# Patient Record
Sex: MALE | Race: WHITE | NOT HISPANIC OR LATINO | Employment: FULL TIME | URBAN - NONMETROPOLITAN AREA
[De-identification: names, ages, dates, MRNs, and addresses within clinical notes are randomized per-mention and may not be internally consistent; named-entity substitution may affect disease eponyms.]

---

## 2018-01-14 ENCOUNTER — HOSPITAL ENCOUNTER (EMERGENCY)
Facility: HOSPITAL | Age: 33
Discharge: HOME | End: 2018-01-14
Attending: NURSE PRACTITIONER | Admitting: NURSE PRACTITIONER
Payer: COMMERCIAL

## 2018-01-14 VITALS
DIASTOLIC BLOOD PRESSURE: 91 MMHG | OXYGEN SATURATION: 98 % | SYSTOLIC BLOOD PRESSURE: 168 MMHG | RESPIRATION RATE: 16 BRPM | TEMPERATURE: 97.5 F

## 2018-01-14 DIAGNOSIS — B34.9 VIRAL SYNDROME: ICD-10-CM

## 2018-01-14 LAB
DEPRECATED S PYO AG THROAT QL EIA: NORMAL
FLUAV+FLUBV AG SPEC QL: NEGATIVE
FLUAV+FLUBV AG SPEC QL: NEGATIVE
SPECIMEN SOURCE: NORMAL
SPECIMEN SOURCE: NORMAL

## 2018-01-14 PROCEDURE — 87804 INFLUENZA ASSAY W/OPTIC: CPT | Performed by: FAMILY MEDICINE

## 2018-01-14 PROCEDURE — 87880 STREP A ASSAY W/OPTIC: CPT | Performed by: NURSE PRACTITIONER

## 2018-01-14 PROCEDURE — 99213 OFFICE O/P EST LOW 20 MIN: CPT | Performed by: NURSE PRACTITIONER

## 2018-01-14 PROCEDURE — G0463 HOSPITAL OUTPT CLINIC VISIT: HCPCS

## 2018-01-14 PROCEDURE — 87081 CULTURE SCREEN ONLY: CPT | Performed by: NURSE PRACTITIONER

## 2018-01-14 ASSESSMENT — ENCOUNTER SYMPTOMS
SORE THROAT: 1
HEADACHES: 1
SINUS PAIN: 0
CHILLS: 0
SHORTNESS OF BREATH: 1
DIARRHEA: 1
VOMITING: 0
FATIGUE: 1
SINUS PRESSURE: 0
NAUSEA: 1
COUGH: 1
DYSURIA: 1
APPETITE CHANGE: 1
FEVER: 0
WHEEZING: 0
FREQUENCY: 0

## 2018-01-14 NOTE — ED AVS SNAPSHOT
[facility]    [address] [place] [address]    Phone:  [phone]                                       Reese Sanchez   MRN: [id number]    Department:  [facility]   Date of Visit:  1/14/2018           After Visit Summary Signature Page     I have received my discharge instructions, and my questions have been answered. I have discussed any challenges I see with this plan with the nurse or doctor.    ..........................................................................................................................................  Patient/Patient Representative Signature      ..........................................................................................................................................  Patient Representative Print Name and Relationship to Patient    ..................................................               ................................................  Date                                            Time    ..........................................................................................................................................  Reviewed by Signature/Title    ...................................................              ..............................................  Date                                                            Time

## 2018-01-14 NOTE — ED AVS SNAPSHOT
[facility]    [address] [address]    Phone:  [phone]                                       Reese Sanchez   MRN: [id number]    Department:  [facility]   Date of Visit:  1/14/2018           Patient Information     Date Of Birth          1985        Your diagnoses for this visit were:     Viral syndrome        You were seen by Veronica Sams NP.      Follow-up Information     Follow up with [facility].    Specialty:  EMERGENCY MEDICINE    Why:  As needed, If symptoms worsen, or concerns develop    Contact information:    [address]  [place] [address] [phone]    Additional information:    From [place]: Take [address]. Turn left at [address]. Turn left at the first stoplight on [address]. At the first stop sign, take a right onto [address]. Take a left into the parking lot and continue through until you reach the North enterance of the building.       From [place]: Take [address]. Take the [address] ramp towards [place]. Turn left onto [address]. Take a slight right onto [address]. Turn left at the first stoplight on [address]. At the first stop sign, take a right onto [address]. Take a left into the parking lot and continue through until you reach the North enterance of the building.       From [place]: Take [address]. Take a right at [address]. At the first stop sign, take a right onto [address]. Take a left into the parking lot and continue through until you reach the North enterance of the building.         Follow up with No Ref-Primary, Physician.    Why:  As needed, if symptoms do not improve        Discharge Instructions         Viral Syndrome (Adult)  A viral illness may cause a number of symptoms. The symptoms depend on the part of the body that the virus affects. If it settles in your nose, throat, and  "lungs, it may cause cough, sore throat, congestion, and sometimes headache. If it settles in your stomach and intestinal tract, it may cause vomiting and diarrhea. Sometimes it causes vague symptoms like \"aching all over,\" feeling tired, loss of appetite, or fever.  A viral illness usually lasts 1 to 2 weeks, but sometimes it lasts longer. In some cases, a more serious infection can look like a viral syndrome in the first few days of the illness. You may need another exam and additional tests to know the difference. Watch for the warning signs listed below.  Home care  Follow these guidelines for taking care of yourself at home:    If symptoms are severe, rest at home for the first 2 to 3 days.    Stay away from cigarette smoke - both your smoke and the smoke from others.    You may use over-the-counter acetaminophen or ibuprofen for fever, muscle aching, and headache, unless another medicine was prescribed for this. If you have chronic liver or kidney disease or ever had a stomach ulcer or GI bleeding, talk with your doctor before using these medicines. No one who is younger than 18 and ill with a fever should take aspirin. It may cause severe disease or death.    Your appetite may be poor, so a light diet is fine. Avoid dehydration by drinking 8 to 12 8-ounce glasses of fluids each day. This may include water; orange juice; lemonade; apple, grape, and cranberry juice; clear fruit drinks; electrolyte replacement and sports drinks; and decaffeinated teas and coffee. If you have been diagnosed with a kidney disease, ask your doctor how much and what types of fluids you should drink to prevent dehydration. If you have kidney disease, drinking too much fluid can cause it build up in the your body and be dangerous to your health.    Over-the-counter remedies won't shorten the length of the illness but may be helpful for cough, sore throat; and nasal and sinus congestion. Don't use decongestants if you have high blood " pressure.  Follow-up care  Follow up with your healthcare provider if you do not improve over the next week.  Call 911  Get emergency medical care if any of the following occur:    Convulsion    Feeling weak, dizzy, or like you are going to faint    Chest pain, shortness of breath, wheezing, or difficulty breathing  When to seek medical advice  Call your healthcare provider right away if any of these occur:    Cough with lots of colored sputum (mucus) or blood in your sputum    Chest pain, shortness of breath, wheezing, or difficulty breathing    Severe headache; face, neck, or ear pain    Severe, constant pain in the lower right side of your belly (abdominal)    Continued vomiting (can t keep liquids down)    Frequent diarrhea (more than 5 times a day); blood (red or black color) or mucus in diarrhea    Feeling weak, dizzy, or like you are going to faint    Extreme thirst    Fever of 100.4 F (38 C) or higher, or as directed by your healthcare provider  Date Last Reviewed: 9/25/2015 2000-2017 [employer]. [address] [place] [address]. All rights reserved. This information is not intended as a substitute for professional medical care. Always follow your healthcare professional's instructions.             Review of your medicines      Notice     You have not been prescribed any medications.            Procedures and tests performed during your visit     Beta strep group A culture    Influenza A/B antigen    Rapid strep screen      Orders Needing Specimen Collection     None      Pending Results     Date and Time Order Name Status Description    1/14/2018 1233 Beta strep group A culture In process             Pending Culture Results     Date and Time Order Name Status Description    1/14/2018 1233 Beta strep group A culture In process             Thank you for choosing [facility]       Thank you for choosing [facility] for your care. Our goal is always to provide you with excellent care.  "Hearing back from our patients is one way we can continue to improve our services. Please take a few minutes to complete the written survey that you may receive in the mail after you visit with us. Thank you!        [employer] Information     [employer] lets you send messages to your doctor, view your test results, renew your prescriptions, schedule appointments and more. To sign up, go to [email or web address] . Click on \"Log in\" on the left side of the screen, which will take you to the Welcome page. Then click on \"Sign up Now\" on the right side of the page.     You will be asked to enter the access code listed below, as well as some personal information. Please follow the directions to create your username and password.     Your access code is: [email or web address]  Expires: 2018 12:57 PM     Your access code will  in 90 days. If you need help or a new code, please call your [facility] clinic or [phone].        Care EveryWhere ID     This is your Care EveryWhere ID. This could be used by other organizations to access your [facility] medical records  [id number]        Equal Access to Services     BE EDUARDO : Hadraul Sommers, aide mason, charmaine james, ruy pedroza . [facility] [phone].    ATENCIÓN: Si habla español, tiene a quintero disposición servicios gratuitos de asistencia lingüística. Gregorio al [phone].    We comply with applicable federal civil rights laws and [place] laws. We do not discriminate on the basis of race, color, national origin, age, disability, sex, sexual orientation, or gender identity.            After Visit Summary       This is your record. Keep this with you and show to your community pharmacist(s) and doctor(s) at your next visit.                  "

## 2018-01-14 NOTE — DISCHARGE INSTRUCTIONS

## 2018-01-14 NOTE — ED PROVIDER NOTES
"  History     Chief Complaint   Patient presents with     Cough     c/o cough and body aches, notes \"took 3 days of tamiful and didn't finish his rx due to I was feeling better\".  notes he wasn't tested they just wrote him a rx and gave it to his wife due to she tested positive     The history is provided by the patient. No  was used.     Reese Sanchez is a 32 year old male who presents today with a CC of cough, body aches, headache, mild nasal congestion, intermittent sore throat x 3-4 days.  No fevers or chills.  He was ill with presumed influenza (daughter tested positive for influenza A) on January 3 and treated with 3 days of Tamiflu (he stopped after 3 days as he was feeling better).  He started feeling better for a week or so and has since started feeling worse again in the past 3-4 days.  He has been taking tylenol or ibuprofen for symptoms with some relief.  Appetite has been decreased.      Problem List:    There are no active problems to display for this patient.       Past Medical History:    History reviewed. No pertinent past medical history.    Past Surgical History:    History reviewed. No pertinent surgical history.    Family History:    No family history on file.    Social History:  Marital Status:   [2]  Social History   Substance Use Topics     Smoking status: Former Smoker     Smokeless tobacco: Never Used     Alcohol use No        Medications:      No current outpatient prescriptions on file.      Review of Systems   Constitutional: Positive for appetite change (decreased for solids, has been pushing water) and fatigue. Negative for chills and fever.   HENT: Positive for congestion (mild) and sore throat (intermittent). Negative for ear pain, sinus pain and sinus pressure.    Respiratory: Positive for cough and shortness of breath (intermittent). Negative for wheezing.    Gastrointestinal: Positive for diarrhea and nausea (infrequent). Negative for vomiting. "   Genitourinary: Positive for dysuria. Negative for frequency and urgency.   Skin: Negative for rash.   Neurological: Positive for headaches.       Physical Exam   BP: 168/91  Heart Rate: 94  Temp: 97.5  F (36.4  C)  Resp: 16  SpO2: 98 %      Physical Exam   Constitutional: He is oriented to person, place, and time. Vital signs are normal. He appears well-developed. He is cooperative.  Non-toxic appearance. He does not appear ill. No distress.   HENT:   Head: Normocephalic and atraumatic.   Right Ear: Tympanic membrane, external ear and ear canal normal.   Left Ear: Tympanic membrane, external ear and ear canal normal.   Nose: Nose normal.   Mouth/Throat: Uvula is midline and oropharynx is clear and moist.   Eyes: Conjunctivae are normal.   Neck: Normal range of motion. Neck supple.   Cardiovascular: Normal rate and regular rhythm.    Pulmonary/Chest: Effort normal and breath sounds normal.   Musculoskeletal: Normal range of motion.   Lymphadenopathy:     He has no cervical adenopathy.   Neurological: He is alert and oriented to person, place, and time.   Skin: Skin is warm and dry.   Psychiatric: He has a normal mood and affect. His behavior is normal.   Nursing note and vitals reviewed.      ED Course     ED Course     Procedures    Results for orders placed or performed during the hospital encounter of 01/14/18   Influenza A/B antigen   Result Value Ref Range    Influenza A/B Agn Specimen Nares     Influenza A Negative NEG^Negative    Influenza B Negative NEG^Negative   Rapid strep screen   Result Value Ref Range    Specimen Description Throat     Rapid Strep A Screen       NEGATIVE: No Group A streptococcal antigen detected by immunoassay, await culture report.   Beta strep group A culture   Result Value Ref Range    Specimen Description Throat     Culture Micro No beta hemolytic Streptococcus Group A isolated        Assessments & Plan (with Medical Decision Making)     I have reviewed the nursing notes.    I  have reviewed the findings, diagnosis, plan and need for follow up with the patient.  ASSESSMENT / PLAN:  (B34.9) Viral syndrome  Comment: exam is essentially normal, VS WNL except BP slightly elevated, long conversation with patient regarding symptoms and exam, at this point in the Urgent Care I don't find any compelling reason to do further imaging or lab work.  Advised patient to follow up with us or establish care with PCP if symptoms are not improving in 5-7 days, sooner if symptoms are significantly worsening  Plan:  Patient verbally educated and given appropriate education sheets for each of their diagnoses and has no questions.   Symptomatic treatments such as those listed below are recommended as needed:   Take OTC motrin or tylenol as directed on the bottle as needed.   Cool mist humidifier at bedside    May try safely elevating HOB or sleeping in recliner   Take OTC cold medicine as directed on bottle - check ingredients, many are multi symptom and may contain tylenol or motrin   Frequent sips of non-caffeine fluids, rest, wash hands often   Sinus rinses such as a netti pot may help with sinus symptoms   Return to ED/UC if symptoms worsen or concerns develop: shortness of breath,     chest pain, unable to control fever < 103 with medications, persistent vomiting, signs/symptoms of dehydration.   If symptoms persist follow up with PCP for re-evaluation.          There are no discharge medications for this patient.      Final diagnoses:   Viral syndrome       1/14/2018   [facility]     Veronica Sams NP  01/16/18 0930

## 2018-01-14 NOTE — ED NOTES
Pt presents with sore throat off and on for 1 week, tired, no energy,  productive cough but pt doesn't know the color of the phlegm,headaches.

## 2018-01-16 LAB
BACTERIA SPEC CULT: NORMAL
SPECIMEN SOURCE: NORMAL

## 2018-04-18 ENCOUNTER — OFFICE VISIT (OUTPATIENT)
Dept: FAMILY MEDICINE | Facility: OTHER | Age: 33
End: 2018-04-18
Attending: FAMILY MEDICINE
Payer: COMMERCIAL

## 2018-04-18 VITALS
HEART RATE: 71 BPM | WEIGHT: 214 LBS | HEIGHT: 74 IN | DIASTOLIC BLOOD PRESSURE: 86 MMHG | TEMPERATURE: 98.5 F | OXYGEN SATURATION: 98 % | SYSTOLIC BLOOD PRESSURE: 128 MMHG | BODY MASS INDEX: 27.46 KG/M2

## 2018-04-18 DIAGNOSIS — R53.83 FATIGUE, UNSPECIFIED TYPE: Primary | ICD-10-CM

## 2018-04-18 DIAGNOSIS — Z76.89 ESTABLISHING CARE WITH NEW DOCTOR, ENCOUNTER FOR: ICD-10-CM

## 2018-04-18 DIAGNOSIS — R03.0 ELEVATED BLOOD PRESSURE READING WITHOUT DIAGNOSIS OF HYPERTENSION: ICD-10-CM

## 2018-04-18 DIAGNOSIS — E55.9 VITAMIN D DEFICIENCY: ICD-10-CM

## 2018-04-18 DIAGNOSIS — R73.03 PREDIABETES: ICD-10-CM

## 2018-04-18 LAB
ALBUMIN SERPL-MCNC: 4.2 G/DL (ref 3.4–5)
ALBUMIN UR-MCNC: NEGATIVE MG/DL
ALP SERPL-CCNC: 85 U/L (ref 40–150)
ALT SERPL W P-5'-P-CCNC: 37 U/L (ref 0–70)
ANION GAP SERPL CALCULATED.3IONS-SCNC: 6 MMOL/L (ref 3–14)
APPEARANCE UR: CLEAR
AST SERPL W P-5'-P-CCNC: 19 U/L (ref 0–45)
BASOPHILS # BLD AUTO: 0.1 10E9/L (ref 0–0.2)
BASOPHILS NFR BLD AUTO: 1.5 %
BILIRUB SERPL-MCNC: 0.7 MG/DL (ref 0.2–1.3)
BILIRUB UR QL STRIP: NEGATIVE
BUN SERPL-MCNC: 14 MG/DL (ref 7–30)
CALCIUM SERPL-MCNC: 9.4 MG/DL (ref 8.5–10.1)
CHLORIDE SERPL-SCNC: 105 MMOL/L (ref 94–109)
CO2 SERPL-SCNC: 31 MMOL/L (ref 20–32)
COLOR UR AUTO: NORMAL
CREAT SERPL-MCNC: 0.92 MG/DL (ref 0.66–1.25)
DIFFERENTIAL METHOD BLD: NORMAL
EOSINOPHIL # BLD AUTO: 0.3 10E9/L (ref 0–0.7)
EOSINOPHIL NFR BLD AUTO: 3.8 %
ERYTHROCYTE [DISTWIDTH] IN BLOOD BY AUTOMATED COUNT: 12.3 % (ref 10–15)
GFR SERPL CREATININE-BSD FRML MDRD: >90 ML/MIN/1.7M2
GLUCOSE SERPL-MCNC: 109 MG/DL (ref 70–99)
GLUCOSE UR STRIP-MCNC: NEGATIVE MG/DL
HCT VFR BLD AUTO: 49.6 % (ref 40–53)
HGB BLD-MCNC: 17.4 G/DL (ref 13.3–17.7)
HGB UR QL STRIP: NEGATIVE
IMM GRANULOCYTES # BLD: 0 10E9/L (ref 0–0.4)
IMM GRANULOCYTES NFR BLD: 0.3 %
KETONES UR STRIP-MCNC: NEGATIVE MG/DL
LEUKOCYTE ESTERASE UR QL STRIP: NEGATIVE
LYMPHOCYTES # BLD AUTO: 1.6 10E9/L (ref 0.8–5.3)
LYMPHOCYTES NFR BLD AUTO: 23.2 %
MCH RBC QN AUTO: 31.5 PG (ref 26.5–33)
MCHC RBC AUTO-ENTMCNC: 35.1 G/DL (ref 31.5–36.5)
MCV RBC AUTO: 90 FL (ref 78–100)
MONOCYTES # BLD AUTO: 0.7 10E9/L (ref 0–1.3)
MONOCYTES NFR BLD AUTO: 9.9 %
NEUTROPHILS # BLD AUTO: 4.2 10E9/L (ref 1.6–8.3)
NEUTROPHILS NFR BLD AUTO: 61.3 %
NITRATE UR QL: NEGATIVE
NRBC # BLD AUTO: 0 10*3/UL
NRBC BLD AUTO-RTO: 0 /100
PH UR STRIP: 7 PH (ref 4.7–8)
PLATELET # BLD AUTO: 193 10E9/L (ref 150–450)
POTASSIUM SERPL-SCNC: 3.7 MMOL/L (ref 3.4–5.3)
PROT SERPL-MCNC: 7.4 G/DL (ref 6.8–8.8)
RBC # BLD AUTO: 5.53 10E12/L (ref 4.4–5.9)
SODIUM SERPL-SCNC: 142 MMOL/L (ref 133–144)
SOURCE: NORMAL
SP GR UR STRIP: 1 (ref 1–1.03)
TSH SERPL DL<=0.005 MIU/L-ACNC: 2.03 MU/L (ref 0.4–4)
UROBILINOGEN UR STRIP-MCNC: NORMAL MG/DL (ref 0–2)
WBC # BLD AUTO: 6.8 10E9/L (ref 4–11)

## 2018-04-18 PROCEDURE — 99203 OFFICE O/P NEW LOW 30 MIN: CPT | Performed by: FAMILY MEDICINE

## 2018-04-18 PROCEDURE — 81003 URINALYSIS AUTO W/O SCOPE: CPT | Performed by: FAMILY MEDICINE

## 2018-04-18 PROCEDURE — 80050 GENERAL HEALTH PANEL: CPT | Performed by: FAMILY MEDICINE

## 2018-04-18 PROCEDURE — 99000 SPECIMEN HANDLING OFFICE-LAB: CPT | Performed by: FAMILY MEDICINE

## 2018-04-18 PROCEDURE — 36415 COLL VENOUS BLD VENIPUNCTURE: CPT | Performed by: FAMILY MEDICINE

## 2018-04-18 PROCEDURE — 82306 VITAMIN D 25 HYDROXY: CPT | Mod: 90 | Performed by: FAMILY MEDICINE

## 2018-04-18 ASSESSMENT — PAIN SCALES - GENERAL: PAINLEVEL: NO PAIN (0)

## 2018-04-18 NOTE — NURSING NOTE
"Chief Complaint   Patient presents with     Establish Care     patient hasnt seen primary for 10 years- from the [facility]      Fatigue       Initial /86 (BP Location: Right arm, Patient Position: Chair, Cuff Size: Adult Large)  Pulse 71  Temp 98.5  F (36.9  C) (Tympanic)  Ht 6' 2\" (1.88 m)  Wt 214 lb (97.1 kg)  SpO2 98%  BMI 27.48 kg/m2 Estimated body mass index is 27.48 kg/(m^2) as calculated from the following:    Height as of this encounter: 6' 2\" (1.88 m).    Weight as of this encounter: 214 lb (97.1 kg).  Medication Reconciliation: completecomplete  JANE APONTE       "

## 2018-04-18 NOTE — PROGRESS NOTES
SUBJECTIVE:   Reese Sanchez is a 32 year old male who presents to clinic today for the following health issues:      Fatigue      Duration: 2-3months    Description (location/character/radiation): fatigue, no energy, shortness of breath on and off. Patient states every time his daughter is sick, he becomes sick but worse.     Intensity:  moderate    Accompanying signs and symptoms: none    History (similar episodes/previous evaluation): None    Precipitating or alleviating factors: None    Therapies tried and outcome: None     Feels tired, poor energy for months.  Recurrent illness, colds, URI, Influenza, stomach bug.  Denies insomnia or snoring or apnea.  Denies concerns with mood, depression, anxiety, etc.  Denies fever or nigh sweats.  Weight is stable.  Appetite has been poor.  Some nausea, but no vomiting.  Normal bowel/bladder function.  No abnormal bleeding.  No new rashes or bruising.  Occasionally feels he can't catch his breath, but no chest pain.  Denies history of seasonal allergies.  Does have a toddler.  Quit smoking in college. Denies substance use.  Works as .  No routine exercise.    BP was high at home, 140-160/100.        Problem list and histories reviewed & adjusted, as indicated.  Additional history: as documented    No current outpatient prescriptions on file.     No current facility-administered medications for this visit.        There is no problem list on file for this patient.    Past Surgical History:   Procedure Laterality Date     APPENDECTOMY       wisdom teeth extraction         Social History   Substance Use Topics     Smoking status: Former Smoker     Smokeless tobacco: Never Used     Alcohol use No     Family History   Problem Relation Age of Onset     Arrhythmia Maternal Grandmother      HEART DISEASE Maternal Grandfather      HEART DISEASE Paternal Grandfather            Reviewed and updated as needed this visit by clinical staff       Reviewed and updated as needed this  "visit by Provider         ROS:  Constitutional, HEENT, cardiovascular, pulmonary, GI, , musculoskeletal, neuro, skin, endocrine and psych systems are negative, except as otherwise noted.    OBJECTIVE:     /86 (BP Location: Right arm, Patient Position: Chair, Cuff Size: Adult Large)  Pulse 71  Temp 98.5  F (36.9  C) (Tympanic)  Ht 6' 2\" (1.88 m)  Wt 214 lb (97.1 kg)  SpO2 98%  BMI 27.48 kg/m2  Body mass index is 27.48 kg/(m^2).  GENERAL: healthy, alert and no distress  EYES: Eyes grossly normal to inspection, PERRL and conjunctivae and sclerae normal  HENT: ear canals and TM's normal, nose and mouth without ulcers or lesions  NECK: no adenopathy, no asymmetry, masses, or scars and thyroid normal to palpation  RESP: lungs clear to auscultation - no rales, rhonchi or wheezes  CV: regular rate and rhythm, normal S1 S2, no S3 or S4, no murmur, click or rub, no peripheral edema and peripheral pulses strong  ABDOMEN: soft, nontender, no hepatosplenomegaly, no masses and bowel sounds normal  MS: no gross musculoskeletal defects noted, no edema  SKIN: no suspicious lesions or rashes  NEURO: Normal strength and tone, mentation intact and speech normal  PSYCH: mentation appears normal, affect normal/bright      ASSESSMENT/PLAN:     (R53.83) Fatigue, unspecified type  (primary encounter diagnosis)  Comment: no red flags or B symptoms; no mood concerns; no specific sleep disorder concerns  Plan: CBC with platelets and differential,         Comprehensive metabolic panel, TSH with free T4        reflex, Vitamin D Deficiency, UA reflex to         Microscopic and Culture      (R03.0) Elevated blood pressure reading without diagnosis of hypertension    (Z76.89) Establishing care with new doctor, encounter for    Patient Instructions   Will call with lab results.  Advised daily men's multivitamin plus vitamin D 2000 units daily.  Advised daily exercise.  Start with achievable goals - 2-3 times per week, 20-30 minutes, " and build from there.    Practice good sleep hygiene - same sleep and wake time, routine exercise, limiting caffeine.    Stop in for nurse only BP checks.  Bring home cuff to compare for accuracy.                Nuha Barrientos MD  [facility]

## 2018-04-18 NOTE — PATIENT INSTRUCTIONS
Will call with lab results.  Advised daily men's multivitamin plus vitamin D 2000 units daily.  Advised daily exercise.  Start with achievable goals - 2-3 times per week, 20-30 minutes, and build from there.    Practice good sleep hygiene - same sleep and wake time, routine exercise, limiting caffeine.    Stop in for nurse only BP checks.  Bring home cuff to compare for accuracy.

## 2018-04-18 NOTE — MR AVS SNAPSHOT
"              After Visit Summary   4/18/2018    Reese Sanchez    MRN: [id number]           Patient Information     Date Of Birth          1985        Visit Information        Provider Department      4/18/2018 8:15 AM Nuha Hobbs MD [facility]        Today's Diagnoses     Fatigue, unspecified type    -  1    Elevated blood pressure reading without diagnosis of hypertension          Care Instructions    Will call with lab results.  Advised daily men's multivitamin plus vitamin D 2000 units daily.  Advised daily exercise.  Start with achievable goals - 2-3 times per week, 20-30 minutes, and build from there.    Practice good sleep hygiene - same sleep and wake time, routine exercise, limiting caffeine.    Stop in for nurse only BP checks.  Bring home cuff to compare for accuracy.              Follow-ups after your visit        Who to contact     If you have questions or need follow up information about today's clinic visit or your schedule please contact [facility] directly at [phone].  Normal or non-critical lab and imaging results will be communicated to you by LicenseMetricshart, letter or phone within 4 business days after the clinic has received the results. If you do not hear from us within 7 days, please contact the clinic through LicenseMetricshart or phone. If you have a critical or abnormal lab result, we will notify you by phone as soon as possible.  Submit refill requests through [employer] or call your pharmacy and they will forward the refill request to us. Please allow 3 business days for your refill to be completed.          Additional Information About Your Visit        Turning Art Information     Turning Art lets you send messages to your doctor, view your test results, renew your prescriptions, schedule appointments and more. To sign up, go to [email or web address] . Click on \"Log in\" on the left side of the screen, which will take you to the Welcome page. Then click on \"Sign up Now\" " "on the right side of the page.     You will be asked to enter the access code listed below, as well as some personal information. Please follow the directions to create your username and password.     Your access code is: [email or web address]  Expires: 2018  8:24 AM     Your access code will  in 90 days. If you need help or a new code, please call your [place] clinic or [phone].        Care EveryWhere ID     This is your Care EveryWhere ID. This could be used by other organizations to access your [place] medical records  [id number]        Your Vitals Were     Pulse Temperature Height Pulse Oximetry BMI (Body Mass Index)       71 98.5  F (36.9  C) (Tympanic) 6' 2\" (1.88 m) 98% 27.48 kg/m2        Blood Pressure from Last 3 Encounters:   18 128/86   18 168/91   16 142/100    Weight from Last 3 Encounters:   18 214 lb (97.1 kg)              We Performed the Following     CBC with platelets and differential     Comprehensive metabolic panel     TSH with free T4 reflex     UA reflex to Microscopic and Culture     Vitamin D Deficiency        Primary Care Provider Fax #    Physician No Ref-Primary [phone]       No address on file        Equal Access to Services     BE EDUARDO : Hadraul cottoo Sojason, waaxda luqadaha, qaybta kaalmada adeegyada, ruy dotson. So [facility] [phone].    ATENCIÓN: Si habla español, tiene a quintero disposición servicios gratuitos de asistencia lingüística. Llame al [phone].    We comply with applicable federal civil rights laws and [place] laws. We do not discriminate on the basis of race, color, national origin, age, disability, sex, sexual orientation, or gender identity.            Thank you!     [facility] you for [facility]  [facility]. Our goal is always to provide you with excellent care. Hearing back from our patients is one way we can continue to improve our services. Please take a few " minutes to complete the written survey that you may receive in the mail after your visit with us. Thank you!             Your Updated Medication List - Protect others around you: Learn how to safely use, store and throw away your medicines at www.disposemymeds.org.      Notice  As of 4/18/2018  8:24 AM    You have not been prescribed any medications.

## 2018-04-19 LAB — DEPRECATED CALCIDIOL+CALCIFEROL SERPL-MC: 14 UG/L (ref 20–75)

## 2018-04-23 ENCOUNTER — TELEPHONE (OUTPATIENT)
Dept: FAMILY MEDICINE | Facility: OTHER | Age: 33
End: 2018-04-23

## 2018-04-23 DIAGNOSIS — R73.03 PREDIABETES: Primary | ICD-10-CM

## 2018-04-23 NOTE — TELEPHONE ENCOUNTER
Dr. Hobbs,  I have been having a hard time getting ahold of patient for results.  I have left numerous messages et copied his results to [employer].  I have pended the [facility] referral for you to sign.  Thank you.

## 2018-05-08 ENCOUNTER — HOSPITAL ENCOUNTER (OUTPATIENT)
Dept: EDUCATION SERVICES | Facility: HOSPITAL | Age: 33
Discharge: HOME | End: 2018-05-08
Attending: FAMILY MEDICINE | Admitting: FAMILY MEDICINE
Payer: COMMERCIAL

## 2018-05-08 VITALS
DIASTOLIC BLOOD PRESSURE: 94 MMHG | HEART RATE: 72 BPM | HEIGHT: 74 IN | BODY MASS INDEX: 27.23 KG/M2 | WEIGHT: 212.2 LBS | SYSTOLIC BLOOD PRESSURE: 134 MMHG | OXYGEN SATURATION: 99 %

## 2018-05-08 DIAGNOSIS — R73.03 PREDIABETES: Primary | ICD-10-CM

## 2018-05-08 LAB — HBA1C MFR BLD: 5.1 % (ref 0–5.7)

## 2018-05-08 PROCEDURE — 97802 MEDICAL NUTRITION INDIV IN: CPT | Performed by: DIETITIAN, REGISTERED

## 2018-05-08 ASSESSMENT — ANXIETY QUESTIONNAIRES
6. BECOMING EASILY ANNOYED OR IRRITABLE: SEVERAL DAYS
IF YOU CHECKED OFF ANY PROBLEMS ON THIS QUESTIONNAIRE, HOW DIFFICULT HAVE THESE PROBLEMS MADE IT FOR YOU TO DO YOUR WORK, TAKE CARE OF THINGS AT HOME, OR GET ALONG WITH OTHER PEOPLE: SOMEWHAT DIFFICULT
2. NOT BEING ABLE TO STOP OR CONTROL WORRYING: SEVERAL DAYS
GAD7 TOTAL SCORE: 7
7. FEELING AFRAID AS IF SOMETHING AWFUL MIGHT HAPPEN: SEVERAL DAYS
5. BEING SO RESTLESS THAT IT IS HARD TO SIT STILL: SEVERAL DAYS
1. FEELING NERVOUS, ANXIOUS, OR ON EDGE: SEVERAL DAYS
3. WORRYING TOO MUCH ABOUT DIFFERENT THINGS: SEVERAL DAYS

## 2018-05-08 ASSESSMENT — PAIN SCALES - GENERAL: PAINLEVEL: NO PAIN (0)

## 2018-05-08 ASSESSMENT — PATIENT HEALTH QUESTIONNAIRE - PHQ9: 5. POOR APPETITE OR OVEREATING: SEVERAL DAYS

## 2018-05-08 NOTE — PROGRESS NOTES
"Pt is here today with dx of prediabetes.  Fasting glucose was 109 - pt feels he may have had some Gatorade prior to test.  A1c today was 5.1%.      /94  Pulse 72  Ht 1.88 m (6' 2\")  Wt 96.3 kg (212 lb 3.2 oz)  SpO2 99%  BMI 27.24 kg/m2  Pt has blood pressure cuff at home and has been checking.    Current weight is stable.     Pt has no family hx of diabetes.      Readiness to learn: willing.     Barriers to learning: none.    Verbal diet recall obtained.  Pt often skips breakfast.  Eats leftovers for lunch.  Supper is meat and starch.  He does not like vegetables much.  Minimal snacking.  No high sugar drinks.  Dines out approximately 2x/month.  No routine exercise. Job as a  is active.       Discussed dx of prediabetes, risk reduction as it relates to pre-diabetes/diabetes, portion control,  benefits of weight loss in preventing/slowing progression to dx of diabetes,  low/high blood sugar, importance of routine exercise.       Pt actively participate and demonstrated adequate understanding of topics discussed.    Plan: Work on more consistent meal times, more fruits/vegetables in diet, routine exercise.  Avoid weight gain.  Monitor glucose at yearly physical.      Time spent with patient: 30 minutes.     Follow up: as needed.     Charisse Mcginnis, BOBBY, CDE        "

## 2018-05-08 NOTE — NURSING NOTE
"Chief Complaint   Patient presents with     Diabetes     new       Initial /96  Pulse 72  Ht 1.88 m (6' 2\")  Wt 96.3 kg (212 lb 3.2 oz)  SpO2 99%  BMI 27.24 kg/m2 Estimated body mass index is 27.24 kg/(m^2) as calculated from the following:    Height as of this encounter: 1.88 m (6' 2\").    Weight as of this encounter: 96.3 kg (212 lb 3.2 oz).  Medication Reconciliation: complete    Courtney Dunham LPN    "

## 2018-05-09 ASSESSMENT — PATIENT HEALTH QUESTIONNAIRE - PHQ9: SUM OF ALL RESPONSES TO PHQ QUESTIONS 1-9: 5

## 2018-05-09 ASSESSMENT — ANXIETY QUESTIONNAIRES: GAD7 TOTAL SCORE: 7

## 2018-05-31 ENCOUNTER — OFFICE VISIT (OUTPATIENT)
Dept: FAMILY MEDICINE | Facility: OTHER | Age: 33
End: 2018-05-31
Attending: FAMILY MEDICINE
Payer: COMMERCIAL

## 2018-05-31 ENCOUNTER — RADIANT APPOINTMENT (OUTPATIENT)
Dept: GENERAL RADIOLOGY | Facility: OTHER | Age: 33
End: 2018-05-31
Attending: FAMILY MEDICINE
Payer: COMMERCIAL

## 2018-05-31 VITALS
BODY MASS INDEX: 26.95 KG/M2 | SYSTOLIC BLOOD PRESSURE: 122 MMHG | HEART RATE: 79 BPM | TEMPERATURE: 98.1 F | OXYGEN SATURATION: 98 % | HEIGHT: 74 IN | DIASTOLIC BLOOD PRESSURE: 84 MMHG | WEIGHT: 210 LBS

## 2018-05-31 DIAGNOSIS — R53.81 MALAISE: ICD-10-CM

## 2018-05-31 DIAGNOSIS — R53.83 FATIGUE, UNSPECIFIED TYPE: ICD-10-CM

## 2018-05-31 DIAGNOSIS — R50.9 FEVER, UNSPECIFIED FEVER CAUSE: ICD-10-CM

## 2018-05-31 DIAGNOSIS — R42 LIGHT HEADED: ICD-10-CM

## 2018-05-31 DIAGNOSIS — R53.81 MALAISE: Primary | ICD-10-CM

## 2018-05-31 LAB
BASOPHILS # BLD AUTO: 0.1 10E9/L (ref 0–0.2)
BASOPHILS NFR BLD AUTO: 1.1 %
DIFFERENTIAL METHOD BLD: ABNORMAL
EOSINOPHIL # BLD AUTO: 0.1 10E9/L (ref 0–0.7)
EOSINOPHIL NFR BLD AUTO: 2.1 %
ERYTHROCYTE [DISTWIDTH] IN BLOOD BY AUTOMATED COUNT: 12.1 % (ref 10–15)
HCT VFR BLD AUTO: 46.4 % (ref 40–53)
HETEROPH AB SER QL: NEGATIVE
HGB BLD-MCNC: 16.4 G/DL (ref 13.3–17.7)
IMM GRANULOCYTES # BLD: 0 10E9/L (ref 0–0.4)
IMM GRANULOCYTES NFR BLD: 0.2 %
LYMPHOCYTES # BLD AUTO: 1.6 10E9/L (ref 0.8–5.3)
LYMPHOCYTES NFR BLD AUTO: 26 %
MCH RBC QN AUTO: 31.3 PG (ref 26.5–33)
MCHC RBC AUTO-ENTMCNC: 35.3 G/DL (ref 31.5–36.5)
MCV RBC AUTO: 89 FL (ref 78–100)
MONOCYTES # BLD AUTO: 0.9 10E9/L (ref 0–1.3)
MONOCYTES NFR BLD AUTO: 14.2 %
NEUTROPHILS # BLD AUTO: 3.5 10E9/L (ref 1.6–8.3)
NEUTROPHILS NFR BLD AUTO: 56.4 %
NRBC # BLD AUTO: 0 10*3/UL
NRBC BLD AUTO-RTO: 0 /100
PLATELET # BLD AUTO: 125 10E9/L (ref 150–450)
RBC # BLD AUTO: 5.24 10E12/L (ref 4.4–5.9)
RETICS # AUTO: 50.3 10E9/L (ref 25–95)
RETICS/RBC NFR AUTO: 1 % (ref 0.5–2)
WBC # BLD AUTO: 6.3 10E9/L (ref 4–11)

## 2018-05-31 PROCEDURE — 36415 COLL VENOUS BLD VENIPUNCTURE: CPT | Performed by: FAMILY MEDICINE

## 2018-05-31 PROCEDURE — 86308 HETEROPHILE ANTIBODY SCREEN: CPT | Performed by: FAMILY MEDICINE

## 2018-05-31 PROCEDURE — 99214 OFFICE O/P EST MOD 30 MIN: CPT | Performed by: FAMILY MEDICINE

## 2018-05-31 PROCEDURE — 99000 SPECIMEN HANDLING OFFICE-LAB: CPT | Performed by: FAMILY MEDICINE

## 2018-05-31 PROCEDURE — 87015 SPECIMEN INFECT AGNT CONCNTJ: CPT | Performed by: FAMILY MEDICINE

## 2018-05-31 PROCEDURE — 40000847 ZZHCL STATISTIC MORPHOLOGY W/INTERP HISTOLOGY TC 85060: Performed by: FAMILY MEDICINE

## 2018-05-31 PROCEDURE — 86618 LYME DISEASE ANTIBODY: CPT | Mod: 90 | Performed by: FAMILY MEDICINE

## 2018-05-31 PROCEDURE — 87207 SMEAR SPECIAL STAIN: CPT | Mod: 90 | Performed by: FAMILY MEDICINE

## 2018-05-31 PROCEDURE — 71046 X-RAY EXAM CHEST 2 VIEWS: CPT | Mod: TC | Performed by: RADIOLOGY

## 2018-05-31 PROCEDURE — 85025 COMPLETE CBC W/AUTO DIFF WBC: CPT | Performed by: FAMILY MEDICINE

## 2018-05-31 PROCEDURE — 85045 AUTOMATED RETICULOCYTE COUNT: CPT | Performed by: FAMILY MEDICINE

## 2018-05-31 PROCEDURE — 87389 HIV-1 AG W/HIV-1&-2 AB AG IA: CPT | Mod: 90 | Performed by: FAMILY MEDICINE

## 2018-05-31 ASSESSMENT — PAIN SCALES - GENERAL: PAINLEVEL: MILD PAIN (3)

## 2018-05-31 NOTE — PATIENT INSTRUCTIONS
Will call with additional testing results - lab and chest xray.  If negative, proceed with CT chest, abdomen/pelvis.  Please keep diary of symptoms.  Most important is monitoring temperature for true fever (100.4 or higher).    Consider cardiac evaluation as well.

## 2018-05-31 NOTE — MR AVS SNAPSHOT
After Visit Summary   5/31/2018    Reese Sanchez    MRN: [id number]           Patient Information     Date Of Birth          1985        Visit Information        Provider Department      5/31/2018 3:30 PM Nuha Hobbs MD [facility]        Today's Diagnoses     Malaise    -  1    Fatigue, unspecified type        Light headed        Fever, unspecified fever cause          Care Instructions    Will call with additional testing results - lab and chest xray.  If negative, proceed with CT chest, abdomen/pelvis.  Please keep diary of symptoms.  Most important is monitoring temperature for true fever (100.4 or higher).    Consider cardiac evaluation as well.          Follow-ups after your visit        Future tests that were ordered for you today     Open Future Orders        Priority Expected Expires Ordered    XR Chest 2 Views Routine  5/31/2019 5/31/2018            Who to contact     If you have questions or need follow up information about today's clinic visit or your schedule please contact [facility] directly at [phone].  Normal or non-critical lab and imaging results will be communicated to you by Tauliahart, letter or phone within 4 business days after the clinic has received the results. If you do not hear from us within 7 days, please contact the clinic through [employer] Sk[employer] or phone. If you have a critical or abnormal lab result, we will notify you by phone as soon as possible.  Submit refill requests through [employer] or call your pharmacy and they will forward the refill request to us. Please allow 3 business days for your refill to be completed.          Additional Information About Your Visit        Tauliahart Information     [employer] gives you secure access to your electronic health record. If you see a primary care provider, you can also send messages to your care team and make appointments. If you have questions, please call your primary care clinic.  If you do not  "have a primary care provider, please call [phone] and they will assist you.        Care EveryWhere ID     This is your Care EveryWhere ID. This could be used by other organizations to access your [place] medical records  [id number]        Your Vitals Were     Pulse Temperature Height Pulse Oximetry BMI (Body Mass Index)       79 98.1  F (36.7  C) (Tympanic) 6' 2\" (1.88 m) 98% 26.96 kg/m2        Blood Pressure from Last 3 Encounters:   05/31/18 122/84   05/08/18 134/94   04/18/18 128/86    Weight from Last 3 Encounters:   05/31/18 210 lb (95.3 kg)   05/08/18 212 lb 3.2 oz (96.3 kg)   04/18/18 214 lb (97.1 kg)              We Performed the Following     Blood Morphology Pathologist Review     CBC with platelets differential     HIV Antigen Antibody Combo     Lyme Disease Silvia with reflex to WB Serum     Mononucleosis screen     Parasite stain     Reticulocyte Count        Primary Care Provider Fax #    Physician No Ref-Primary [phone]       No address on file        Equal Access to Services     [facility] : Hadii jacklyn reilly Sojason, waaxda luqadaha, qaybta kaalmada adejodi, ruy pedroza . So [facility] [phone].    ATENCIÓN: Si habla español, tiene a quintero disposición servicios gratuitos de asistencia lingüística. Llame al [phone].    We comply with applicable federal civil rights laws and [place] laws. We do not discriminate on the basis of race, color, national origin, age, disability, sex, sexual orientation, or gender identity.            Thank you!     Thank you for choosing [facility] [place]  for your care. Our goal is always to provide you with excellent care. Hearing back from our patients is one way we can continue to improve our services. Please take a few minutes to complete the written survey that you may receive in the mail after your visit with us. Thank you!             Your Updated Medication List - Protect others around you: Learn how to safely " use, store and throw away your medicines at www.disposemymeds.org.          This list is accurate as of 5/31/18  4:14 PM.  Always use your most recent med list.                   Brand Name Dispense Instructions for use Diagnosis    cholecalciferol 64620 units capsule    VITAMIN D3    8 capsule    Take 1 capsule (50,000 Units) by mouth once a week    Vitamin D deficiency       MULTIVITAMIN ADULT PO

## 2018-05-31 NOTE — PROGRESS NOTES
"  SUBJECTIVE:   Reese Sanchez is a 32 year old male who presents to clinic today for the following health issues:      Not feeling right      Duration: 4-5 months    Description (location/character/radiation): fatigue, \"not feeling right\" feeling \"faint\" and \"flush\"    Intensity:  mild, moderate    Accompanying signs and symptoms: fever 101.7    History (similar episodes/previous evaluation): was seen by Nuha Barrientos 4/18/18    Precipitating or alleviating factors: None    Therapies tried and outcome: multivitamin daily, trying to implement good sleep hygiene, almost no caffeine       Was seen recently with same symptoms.  Labs notable for low vit D, but otherwise normal CBC, CMP, UA, TSH.    Continues to feel off.  Feels light headed at times, but no vertigo or syncope.  Has had fevers on 2-3 separate occasions, max 101.7.      Some nausea, but no vomiting.  Normal bowel/bladder function.  No rash or bruising.  No headache or vision changes.  Minimal congestion, ears plugged.     No chest pain or dyspnea.  Unsure about palpitations.      No recent travel, medication changes, sick exposures.        Problem list and histories reviewed & adjusted, as indicated.  Additional history: as documented    Current Outpatient Prescriptions   Medication     cholecalciferol (VITAMIN D3) 36483 units capsule     Multiple Vitamins-Minerals (MULTIVITAMIN ADULT PO)     No current facility-administered medications for this visit.        There is no problem list on file for this patient.    Past Surgical History:   Procedure Laterality Date     APPENDECTOMY       wisdom teeth extraction         Social History   Substance Use Topics     Smoking status: Former Smoker     Smokeless tobacco: Never Used     Alcohol use No     Family History   Problem Relation Age of Onset     Arrhythmia Maternal Grandmother      HEART DISEASE Maternal Grandfather      HEART DISEASE Paternal Grandfather            Reviewed and updated as needed this visit " "by clinical staff       Reviewed and updated as needed this visit by Provider         ROS:  Constitutional, HEENT, cardiovascular, pulmonary, GI, , musculoskeletal, neuro, skin, endocrine and psych systems are negative, except as otherwise noted.    OBJECTIVE:     /84 (BP Location: Right arm, Patient Position: Sitting, Cuff Size: Adult Large)  Pulse 79  Temp 98.1  F (36.7  C) (Tympanic)  Ht 6' 2\" (1.88 m)  Wt 210 lb (95.3 kg)  SpO2 98%  BMI 26.96 kg/m2  Body mass index is 26.96 kg/(m^2).  GENERAL: healthy, alert and no distress  EYES: Eyes grossly normal to inspection, PERRL and conjunctivae and sclerae normal  HENT: ear canals and TM's normal, nose and mouth without ulcers or lesions  NECK: no adenopathy, no asymmetry, masses, or scars and thyroid normal to palpation  RESP: lungs clear to auscultation - no rales, rhonchi or wheezes  CV: regular rate and rhythm, normal S1 S2, no S3 or S4, no murmur, click or rub, no peripheral edema and peripheral pulses strong  ABDOMEN: soft, nontender, no hepatosplenomegaly, no masses and bowel sounds normal  MS: no gross musculoskeletal defects noted, no edema  SKIN: no suspicious lesions or rashes  NEURO: Normal strength and tone, mentation intact and speech normal  PSYCH: mentation appears normal, affect normal/bright      ASSESSMENT/PLAN:   (R53.81) Malaise  (primary encounter diagnosis)  Plan: Lyme Disease Silvia with reflex to WB Serum, HIV         Antigen Antibody Combo, Parasite stain,         Mononucleosis screen, Blood Morphology         Pathologist Review, CBC with platelets         differential, Reticulocyte Count, XR Chest 2         Views    (R53.83) Fatigue, unspecified type  Plan: Lyme Disease Silvia with reflex to WB Serum, HIV         Antigen Antibody Combo, Parasite stain,         Mononucleosis screen, Blood Morphology         Pathologist Review, CBC with platelets         differential, Reticulocyte Count, XR Chest 2         Views    (R42) Light " headed  Plan: Lyme Disease Silvia with reflex to WB Serum, HIV         Antigen Antibody Combo, Parasite stain,         Mononucleosis screen, Blood Morphology         Pathologist Review, CBC with platelets         differential, Reticulocyte Count, XR Chest 2         Views    (R50.9) Fever, unspecified fever cause  Plan: Lyme Disease Silvia with reflex to WB Serum, HIV         Antigen Antibody Combo, Parasite stain,         Mononucleosis screen, Blood Morphology         Pathologist Review, CBC with platelets         differential, Reticulocyte Count, XR Chest 2         Views    Vague symptoms - malaise, fatigue, light headed - accompanied by intermittent fevers.  Infectious vs inflammatory vs malignancy vs other.   Further lab and xray today.   Consider CT to rule out occult malignancy next.  Diary of fevers.  If ongoing, may need infectious disease consult.  Consider cardiac evaluation as well, though fever less likely part of that.  Patient in agreement with plan.    Patient Instructions   Will call with additional testing results - lab and chest xray.  If negative, proceed with CT chest, abdomen/pelvis.  Please keep diary of symptoms.  Most important is monitoring temperature for true fever (100.4 or higher).    Consider cardiac evaluation as well.            Nuha Barrientos MD  [facility]

## 2018-06-04 LAB
B BURGDOR IGG+IGM SER QL: <0.01 (ref 0–0.89)
COPATH REPORT: NORMAL
COPATH REPORT: NORMAL
HIV 1+2 AB+HIV1 P24 AG SERPL QL IA: NONREACTIVE
PARASITE SPEC INSPECT: NORMAL
SPECIMEN SOURCE: NORMAL

## 2019-01-14 ENCOUNTER — HOSPITAL ENCOUNTER (EMERGENCY)
Facility: HOSPITAL | Age: 34
Discharge: HOME | End: 2019-01-14
Attending: FAMILY MEDICINE | Admitting: FAMILY MEDICINE
Payer: COMMERCIAL

## 2019-01-14 VITALS
SYSTOLIC BLOOD PRESSURE: 133 MMHG | TEMPERATURE: 99.1 F | OXYGEN SATURATION: 95 % | DIASTOLIC BLOOD PRESSURE: 88 MMHG | RESPIRATION RATE: 16 BRPM

## 2019-01-14 DIAGNOSIS — E86.0 DEHYDRATION: ICD-10-CM

## 2019-01-14 LAB
ALBUMIN SERPL-MCNC: 4.2 G/DL (ref 3.4–5)
ALBUMIN UR-MCNC: NEGATIVE MG/DL
ALP SERPL-CCNC: 100 U/L (ref 40–150)
ALT SERPL W P-5'-P-CCNC: 48 U/L (ref 0–70)
AMPHETAMINES UR QL SCN: NEGATIVE
ANION GAP SERPL CALCULATED.3IONS-SCNC: 6 MMOL/L (ref 3–14)
APPEARANCE UR: CLEAR
AST SERPL W P-5'-P-CCNC: 18 U/L (ref 0–45)
BACTERIA #/AREA URNS HPF: ABNORMAL /HPF
BARBITURATES UR QL: NEGATIVE
BASOPHILS # BLD AUTO: 0.1 10E9/L (ref 0–0.2)
BASOPHILS NFR BLD AUTO: 1.2 %
BENZODIAZ UR QL: NEGATIVE
BILIRUB SERPL-MCNC: 0.4 MG/DL (ref 0.2–1.3)
BILIRUB UR QL STRIP: NEGATIVE
BUN SERPL-MCNC: 11 MG/DL (ref 7–30)
CALCIUM SERPL-MCNC: 8.9 MG/DL (ref 8.5–10.1)
CANNABINOIDS UR QL SCN: NEGATIVE
CHLORIDE SERPL-SCNC: 102 MMOL/L (ref 94–109)
CO2 SERPL-SCNC: 31 MMOL/L (ref 20–32)
COCAINE UR QL: NEGATIVE
COLOR UR AUTO: ABNORMAL
CREAT SERPL-MCNC: 0.98 MG/DL (ref 0.66–1.25)
CRP SERPL-MCNC: <2.9 MG/L (ref 0–8)
DIFFERENTIAL METHOD BLD: NORMAL
EOSINOPHIL # BLD AUTO: 0.2 10E9/L (ref 0–0.7)
EOSINOPHIL NFR BLD AUTO: 3.2 %
ERYTHROCYTE [DISTWIDTH] IN BLOOD BY AUTOMATED COUNT: 12.4 % (ref 10–15)
GFR SERPL CREATININE-BSD FRML MDRD: >90 ML/MIN/{1.73_M2}
GLUCOSE BLDC GLUCOMTR-MCNC: 129 MG/DL (ref 70–99)
GLUCOSE SERPL-MCNC: 127 MG/DL (ref 70–99)
GLUCOSE UR STRIP-MCNC: NEGATIVE MG/DL
HCT VFR BLD AUTO: 47.1 % (ref 40–53)
HGB BLD-MCNC: 16.6 G/DL (ref 13.3–17.7)
HGB UR QL STRIP: NEGATIVE
IMM GRANULOCYTES # BLD: 0 10E9/L (ref 0–0.4)
IMM GRANULOCYTES NFR BLD: 0.4 %
KETONES UR STRIP-MCNC: NEGATIVE MG/DL
LEUKOCYTE ESTERASE UR QL STRIP: NEGATIVE
LYMPHOCYTES # BLD AUTO: 1.7 10E9/L (ref 0.8–5.3)
LYMPHOCYTES NFR BLD AUTO: 24.5 %
MCH RBC QN AUTO: 31.7 PG (ref 26.5–33)
MCHC RBC AUTO-ENTMCNC: 35.2 G/DL (ref 31.5–36.5)
MCV RBC AUTO: 90 FL (ref 78–100)
METHADONE UR QL SCN: NEGATIVE
MONOCYTES # BLD AUTO: 0.7 10E9/L (ref 0–1.3)
MONOCYTES NFR BLD AUTO: 10.5 %
NEUTROPHILS # BLD AUTO: 4.1 10E9/L (ref 1.6–8.3)
NEUTROPHILS NFR BLD AUTO: 60.2 %
NITRATE UR QL: NEGATIVE
NRBC # BLD AUTO: 0 10*3/UL
NRBC BLD AUTO-RTO: 0 /100
OPIATES UR QL SCN: NEGATIVE
PCP UR QL SCN: NEGATIVE
PH UR STRIP: 7 PH (ref 4.7–8)
PLATELET # BLD AUTO: 187 10E9/L (ref 150–450)
POTASSIUM SERPL-SCNC: 3.9 MMOL/L (ref 3.4–5.3)
PROT SERPL-MCNC: 7.1 G/DL (ref 6.8–8.8)
RBC # BLD AUTO: 5.24 10E12/L (ref 4.4–5.9)
RBC #/AREA URNS AUTO: 1 /HPF (ref 0–2)
SODIUM SERPL-SCNC: 139 MMOL/L (ref 133–144)
SOURCE: ABNORMAL
SP GR UR STRIP: 1 (ref 1–1.03)
UROBILINOGEN UR STRIP-MCNC: NORMAL MG/DL (ref 0–2)
WBC # BLD AUTO: 6.9 10E9/L (ref 4–11)
WBC #/AREA URNS AUTO: 1 /HPF (ref 0–5)

## 2019-01-14 PROCEDURE — 93010 ELECTROCARDIOGRAM REPORT: CPT | Performed by: INTERNAL MEDICINE

## 2019-01-14 PROCEDURE — 93005 ELECTROCARDIOGRAM TRACING: CPT

## 2019-01-14 PROCEDURE — 25000128 H RX IP 250 OP 636: Performed by: FAMILY MEDICINE

## 2019-01-14 PROCEDURE — 80307 DRUG TEST PRSMV CHEM ANLYZR: CPT | Performed by: FAMILY MEDICINE

## 2019-01-14 PROCEDURE — 00000146 ZZHCL STATISTIC GLUCOSE BY METER IP

## 2019-01-14 PROCEDURE — 96361 HYDRATE IV INFUSION ADD-ON: CPT

## 2019-01-14 PROCEDURE — 96374 THER/PROPH/DIAG INJ IV PUSH: CPT

## 2019-01-14 PROCEDURE — 80053 COMPREHEN METABOLIC PANEL: CPT | Performed by: FAMILY MEDICINE

## 2019-01-14 PROCEDURE — 99285 EMERGENCY DEPT VISIT HI MDM: CPT | Mod: Z6 | Performed by: FAMILY MEDICINE

## 2019-01-14 PROCEDURE — 85025 COMPLETE CBC W/AUTO DIFF WBC: CPT | Performed by: FAMILY MEDICINE

## 2019-01-14 PROCEDURE — 99284 EMERGENCY DEPT VISIT MOD MDM: CPT | Mod: 25

## 2019-01-14 PROCEDURE — 86140 C-REACTIVE PROTEIN: CPT | Performed by: FAMILY MEDICINE

## 2019-01-14 PROCEDURE — 81001 URINALYSIS AUTO W/SCOPE: CPT | Performed by: FAMILY MEDICINE

## 2019-01-14 PROCEDURE — 36415 COLL VENOUS BLD VENIPUNCTURE: CPT | Performed by: FAMILY MEDICINE

## 2019-01-14 RX ORDER — ONDANSETRON 2 MG/ML
4 INJECTION INTRAMUSCULAR; INTRAVENOUS
Status: COMPLETED | OUTPATIENT
Start: 2019-01-14 | End: 2019-01-14

## 2019-01-14 RX ORDER — SODIUM CHLORIDE 9 MG/ML
INJECTION, SOLUTION INTRAVENOUS CONTINUOUS
Status: DISCONTINUED | OUTPATIENT
Start: 2019-01-14 | End: 2019-01-14 | Stop reason: HOSPADM

## 2019-01-14 RX ADMIN — SODIUM CHLORIDE 1000 ML: 9 INJECTION, SOLUTION INTRAVENOUS at 10:27

## 2019-01-14 RX ADMIN — ONDANSETRON 4 MG: 2 INJECTION INTRAMUSCULAR; INTRAVENOUS at 10:32

## 2019-01-14 RX ADMIN — SODIUM CHLORIDE 1000 ML: 9 INJECTION, SOLUTION INTRAVENOUS at 10:59

## 2019-01-14 ASSESSMENT — ENCOUNTER SYMPTOMS
WEAKNESS: 1
NAUSEA: 0
ABDOMINAL PAIN: 0
DIAPHORESIS: 0
DIARRHEA: 0
LIGHT-HEADEDNESS: 1
FATIGUE: 1
DYSPHORIC MOOD: 1
NECK PAIN: 0
MYALGIAS: 1
DYSURIA: 0
SHORTNESS OF BREATH: 0
HEADACHES: 0
VOMITING: 0
DIZZINESS: 0
SPEECH DIFFICULTY: 0
NUMBNESS: 1
ARTHRALGIAS: 1
CONSTIPATION: 0
PALPITATIONS: 0
ACTIVITY CHANGE: 1

## 2019-01-14 NOTE — ED NOTES
Pt presents with sudden inset of feeling weak and dizzy, started at work, this am. Denies any changes in meds or ADL's, also c/o right arm pain.  at bedside, previously, no neuro eval indicated at this time. See assessments. Call light placed within reach. Instructed on need for urine sample.

## 2019-01-14 NOTE — ED NOTES
Pt reported that he was at work and one hour PTA he started to have dizziness  And right arm weakness.

## 2019-01-14 NOTE — ED NOTES
Reviewed discharge instructions with pt, verbalized understanding, did verbalize understanding. Copy of AVS, did encourage to set up PCP, did offer  to assist with setting up PCP, pt declined at this time. Copy of AVS provided prior to discharge.

## 2019-01-14 NOTE — ED PROVIDER NOTES
"  History     Chief Complaint   Patient presents with     Dizziness     one hour PTA     Extremity Weakness     right arm only     HPI  Reese Sanchez is a 33 year old male who presents with vague complaints of dizziness/wanting to pass out and extremity weakness in the right arm only.  He is a  and was at work when the symptoms started.  He admits to having some alcohol this weekend but does not give us a quantity.  He has vague nausea and pain in his right arm that worsens his dizziness when he raises the arm.  He denies any chest pain, has no lateralizing symptoms.  When asked if he drank enough fluids this weekend he stated \"probably not\", when asked how much he had had in the way of alcohol this weekend he could only say \"some\", I do not believe he remembers.    Problem List:    There are no active problems to display for this patient.       Past Medical History:    No past medical history on file.    Past Surgical History:    Past Surgical History:   Procedure Laterality Date     APPENDECTOMY       wisdom teeth extraction         Family History:    Family History   Problem Relation Age of Onset     Arrhythmia Maternal Grandmother      Heart Disease Maternal Grandfather      Heart Disease Paternal Grandfather        Social History:  Marital Status:   [2]  Social History     Tobacco Use     Smoking status: Former Smoker     Smokeless tobacco: Never Used   Substance Use Topics     Alcohol use: No     Drug use: No        Medications:      No current outpatient medications on file.      Review of Systems   Constitutional: Positive for activity change and fatigue. Negative for diaphoresis.   HENT: Negative.    Respiratory: Negative for shortness of breath.    Cardiovascular: Negative for chest pain and palpitations.   Gastrointestinal: Negative for abdominal pain, constipation, diarrhea, nausea and vomiting.   Genitourinary: Negative for dysuria.   Musculoskeletal: Positive for arthralgias and myalgias. " Negative for neck pain.        Right arm   Skin: Negative.    Neurological: Positive for weakness, light-headedness and numbness. Negative for dizziness, speech difficulty and headaches.        Perceived weakness in the right arm, numbness.   Psychiatric/Behavioral: Positive for dysphoric mood.       Physical Exam   BP: 160/100  Heart Rate: 97  Temp: 98.9  F (37.2  C)  Resp: 16  SpO2: 99 %  Lying Orthostatic BP: 150/101  Lying Orthostatic Pulse: 88 bpm  Sitting Orthostatic BP: 147/95  Sitting Orthostatic Pulse: 83 bpm  Standing Orthostatic BP: 143/93  Standing Orthostatic Pulse: 76 bpm      Physical Exam   Constitutional: He is oriented to person, place, and time. He appears well-developed and well-nourished. No distress.   HENT:   Head: Normocephalic and atraumatic.   Mouth/Throat: Mucous membranes are dry.   Eyes: EOM are normal. Pupils are equal, round, and reactive to light.   Neck: Normal range of motion. Neck supple.   Cardiovascular: Normal rate, regular rhythm, normal heart sounds and intact distal pulses.   No murmur heard.  Pulmonary/Chest: Effort normal and breath sounds normal. No respiratory distress.   Abdominal: Soft. Bowel sounds are normal. He exhibits no distension. There is no tenderness.   Musculoskeletal: Normal range of motion. He exhibits tenderness.   Lymphadenopathy:     He has no cervical adenopathy.   Neurological: He is alert and oriented to person, place, and time. No cranial nerve deficit or sensory deficit. He exhibits normal muscle tone. Coordination normal.   Skin: Skin is warm and dry. Capillary refill takes less than 2 seconds.   Psychiatric: His affect is blunt. His speech is delayed. He is slowed. Cognition and memory are normal. He exhibits a depressed mood.   Nursing note and vitals reviewed.      ED Course        Procedures         EKG Interpretation:      Interpreted by Preeti Urbano  Time reviewed: 1045  Symptoms at time of EKG: weakness, right arm discomfort  "  Rhythm: normal sinus   Rate: normal  Axis: normal  Ectopy: none  Conduction: normal  ST Segments/ T Waves: No ST-T wave changes  Q Waves: none  Comparison to prior: No old EKG available    Clinical Impression: normal EKG    Neuro eval completed.  CN II-XII intact on both sides, no paresis   Strength intact overall except in right side, arm only.  States lightheadedness increases with movement of the right arm.  Coordination in extremities normal.  Sensation intact except in right arm, states \"numb\", but perceives touch.     Results for orders placed or performed during the hospital encounter of 01/14/19 (from the past 24 hour(s))   CBC with platelets differential   Result Value Ref Range    WBC 6.9 4.0 - 11.0 10e9/L    RBC Count 5.24 4.4 - 5.9 10e12/L    Hemoglobin 16.6 13.3 - 17.7 g/dL    Hematocrit 47.1 40.0 - 53.0 %    MCV 90 78 - 100 fl    MCH 31.7 26.5 - 33.0 pg    MCHC 35.2 31.5 - 36.5 g/dL    RDW 12.4 10.0 - 15.0 %    Platelet Count 187 150 - 450 10e9/L    Diff Method Automated Method     % Neutrophils 60.2 %    % Lymphocytes 24.5 %    % Monocytes 10.5 %    % Eosinophils 3.2 %    % Basophils 1.2 %    % Immature Granulocytes 0.4 %    Nucleated RBCs 0 0 /100    Absolute Neutrophil 4.1 1.6 - 8.3 10e9/L    Absolute Lymphocytes 1.7 0.8 - 5.3 10e9/L    Absolute Monocytes 0.7 0.0 - 1.3 10e9/L    Absolute Eosinophils 0.2 0.0 - 0.7 10e9/L    Absolute Basophils 0.1 0.0 - 0.2 10e9/L    Abs Immature Granulocytes 0.0 0 - 0.4 10e9/L    Absolute Nucleated RBC 0.0    CRP inflammation   Result Value Ref Range    CRP Inflammation <2.9 0.0 - 8.0 mg/L   Comprehensive metabolic panel   Result Value Ref Range    Sodium 139 133 - 144 mmol/L    Potassium 3.9 3.4 - 5.3 mmol/L    Chloride 102 94 - 109 mmol/L    Carbon Dioxide 31 20 - 32 mmol/L    Anion Gap 6 3 - 14 mmol/L    Glucose 127 (H) 70 - 99 mg/dL    Urea Nitrogen 11 7 - 30 mg/dL    Creatinine 0.98 0.66 - 1.25 mg/dL    GFR Estimate >90 >60 mL/min/[1.73_m2]    GFR Estimate If " Black >90 >60 mL/min/[1.73_m2]    Calcium 8.9 8.5 - 10.1 mg/dL    Bilirubin Total 0.4 0.2 - 1.3 mg/dL    Albumin 4.2 3.4 - 5.0 g/dL    Protein Total 7.1 6.8 - 8.8 g/dL    Alkaline Phosphatase 100 40 - 150 U/L    ALT 48 0 - 70 U/L    AST 18 0 - 45 U/L   Glucose by meter   Result Value Ref Range    Glucose 129 (H) 70 - 99 mg/dL   Drug Screen Urine (Range)   Result Value Ref Range    Amphetamine Qual Urine PENDING NEG^Negative    Barbiturates Qual Urine Negative NEG^Negative    Benzodiazepine Qual Urine Negative NEG^Negative    Cannabinoids Qual Urine Negative NEG^Negative    Cocaine Qual Urine Negative NEG^Negative    Opiates Qualitative Urine Negative NEG^Negative    Methadone Qual Urine Negative NEG^Negative    PCP Qual Urine Negative NEG^Negative   UA with Microscopic reflex to Culture   Result Value Ref Range    Color Urine Straw     Appearance Urine Clear     Glucose Urine Negative NEG^Negative mg/dL    Bilirubin Urine Negative NEG^Negative    Ketones Urine Negative NEG^Negative mg/dL    Specific Gravity Urine 1.005 1.003 - 1.035    Blood Urine Negative NEG^Negative    pH Urine 7.0 4.7 - 8.0 pH    Protein Albumin Urine Negative NEG^Negative mg/dL    Urobilinogen mg/dL Normal 0.0 - 2.0 mg/dL    Nitrite Urine Negative NEG^Negative    Leukocyte Esterase Urine Negative NEG^Negative    Source Midstream Urine     WBC Urine 1 0 - 5 /HPF    RBC Urine 1 0 - 2 /HPF    Bacteria Urine None (A) NEG^Negative /HPF       Medications   0.9% sodium chloride BOLUS (0 mLs Intravenous Stopped 1/14/19 1159)     Followed by   0.9% sodium chloride BOLUS (0 mLs Intravenous Stopped 1/14/19 1058)     Followed by   sodium chloride 0.9% infusion (not administered)   ondansetron (ZOFRAN) injection 4 mg (4 mg Intravenous Given 1/14/19 1032)       Assessments & Plan (with Medical Decision Making)   Patient received 2 L normal saline.  His arm symptoms have completely resolved and he is doing much better.  He states that he feels okay.  We will  discharge him, encouraged him to drink as much fluid as he can and follow-up with primary care if symptoms do not remit.    I have reviewed the nursing notes.    I have reviewed the findings, diagnosis, plan and need for follow up with the patient.        Medication List      There are no discharge medications for this visit.         Final diagnoses:   Dehydration       1/14/2019   [facility]     Preeti Alonzo MD  01/14/19 4195

## 2019-01-14 NOTE — ED AVS SNAPSHOT
[facility]  [address] [place] [id number]  Phone:  [phone]                                    Reese Sanchez   MRN: [id number]    Department:  [facility]   Date of Visit:  1/14/2019           After Visit Summary Signature Page    I have received my discharge instructions, and my questions have been answered. I have discussed any challenges I see with this plan with the nurse or doctor.    ..........................................................................................................................................  Patient/Patient Representative Signature      ..........................................................................................................................................  Patient Representative Print Name and Relationship to Patient    ..................................................               ................................................  Date                                   Time    ..........................................................................................................................................  Reviewed by Signature/Title    ...................................................              ..............................................  Date                                               Time          22EPIC Rev 08/18

## 2019-01-21 ENCOUNTER — OFFICE VISIT (OUTPATIENT)
Dept: FAMILY MEDICINE | Facility: OTHER | Age: 34
End: 2019-01-21
Attending: FAMILY MEDICINE
Payer: COMMERCIAL

## 2019-01-21 ENCOUNTER — ANCILLARY PROCEDURE (OUTPATIENT)
Dept: GENERAL RADIOLOGY | Facility: OTHER | Age: 34
End: 2019-01-21
Attending: FAMILY MEDICINE
Payer: COMMERCIAL

## 2019-01-21 VITALS
SYSTOLIC BLOOD PRESSURE: 128 MMHG | OXYGEN SATURATION: 97 % | BODY MASS INDEX: 28.37 KG/M2 | HEART RATE: 75 BPM | TEMPERATURE: 98.5 F | WEIGHT: 221 LBS | DIASTOLIC BLOOD PRESSURE: 88 MMHG

## 2019-01-21 DIAGNOSIS — R29.898 WEAKNESS OF RIGHT UPPER EXTREMITY: ICD-10-CM

## 2019-01-21 DIAGNOSIS — R29.898 WEAKNESS OF RIGHT LOWER EXTREMITY: ICD-10-CM

## 2019-01-21 DIAGNOSIS — R20.2 PARESTHESIAS: ICD-10-CM

## 2019-01-21 DIAGNOSIS — R29.898 WEAKNESS OF RIGHT LOWER EXTREMITY: Primary | ICD-10-CM

## 2019-01-21 LAB
BASOPHILS # BLD AUTO: 0.1 10E9/L (ref 0–0.2)
BASOPHILS NFR BLD AUTO: 1.3 %
CRP SERPL-MCNC: <2.9 MG/L (ref 0–8)
DIFFERENTIAL METHOD BLD: NORMAL
EOSINOPHIL # BLD AUTO: 0.2 10E9/L (ref 0–0.7)
EOSINOPHIL NFR BLD AUTO: 2.4 %
ERYTHROCYTE [DISTWIDTH] IN BLOOD BY AUTOMATED COUNT: 12.2 % (ref 10–15)
HCT VFR BLD AUTO: 46.9 % (ref 40–53)
HGB BLD-MCNC: 16.3 G/DL (ref 13.3–17.7)
IMM GRANULOCYTES # BLD: 0 10E9/L (ref 0–0.4)
IMM GRANULOCYTES NFR BLD: 0.2 %
LYMPHOCYTES # BLD AUTO: 1.7 10E9/L (ref 0.8–5.3)
LYMPHOCYTES NFR BLD AUTO: 18.9 %
MCH RBC QN AUTO: 31 PG (ref 26.5–33)
MCHC RBC AUTO-ENTMCNC: 34.8 G/DL (ref 31.5–36.5)
MCV RBC AUTO: 89 FL (ref 78–100)
MONOCYTES # BLD AUTO: 1.1 10E9/L (ref 0–1.3)
MONOCYTES NFR BLD AUTO: 11.9 %
NEUTROPHILS # BLD AUTO: 5.8 10E9/L (ref 1.6–8.3)
NEUTROPHILS NFR BLD AUTO: 65.3 %
NRBC # BLD AUTO: 0 10*3/UL
NRBC BLD AUTO-RTO: 0 /100
PLATELET # BLD AUTO: 216 10E9/L (ref 150–450)
RBC # BLD AUTO: 5.25 10E12/L (ref 4.4–5.9)
WBC # BLD AUTO: 8.9 10E9/L (ref 4–11)

## 2019-01-21 PROCEDURE — 85025 COMPLETE CBC W/AUTO DIFF WBC: CPT | Performed by: FAMILY MEDICINE

## 2019-01-21 PROCEDURE — 86618 LYME DISEASE ANTIBODY: CPT | Mod: 90 | Performed by: FAMILY MEDICINE

## 2019-01-21 PROCEDURE — 99000 SPECIMEN HANDLING OFFICE-LAB: CPT | Performed by: FAMILY MEDICINE

## 2019-01-21 PROCEDURE — 86140 C-REACTIVE PROTEIN: CPT | Performed by: FAMILY MEDICINE

## 2019-01-21 PROCEDURE — 72040 X-RAY EXAM NECK SPINE 2-3 VW: CPT | Mod: TC

## 2019-01-21 PROCEDURE — 36415 COLL VENOUS BLD VENIPUNCTURE: CPT | Performed by: FAMILY MEDICINE

## 2019-01-21 PROCEDURE — 99214 OFFICE O/P EST MOD 30 MIN: CPT | Performed by: FAMILY MEDICINE

## 2019-01-21 ASSESSMENT — PAIN SCALES - GENERAL: PAINLEVEL: EXTREME PAIN (8)

## 2019-01-21 NOTE — PROGRESS NOTES
"  SUBJECTIVE:   Reese Sanchez is a 33 year old male who presents to clinic today for the following health issues:    ED/UC Followup:    Facility:  [facility]  Date of visit: 1/14/19  Reason for visit: Dehydration (dizziness, extremity weakness)  Current Status: Dizziness / weakness - resolved.   Numbness, asleep sensation in right arm and right leg (this has not resolved, has been consistent however the pain has been intermittent)  Neck and bilateral shoulder pain has been intermittent however stated has gotten worsen when occurring. 8/10   At times the right arm and leg will not respond - patient states tries to move extremity at times and will not move at all just drags.       All labs normal in ER.  EKG normal.  Symptoms at that time were in RUE only, not RLE.  Gave him IVF.  Felt to be dehydration possibly.    Was at work, and felt like right arm \"fell asleep\", numb/tingly, not responding like usual.  Then symptoms in right leg started after discharge that day.  Since then - numb/tinglig has been consistent, but pain is intermittent.  Pain is in right shoulder, elbow, and right lower leg, and now in past 2 days into neck.  \"feels like someone is ripping my arm off.\"  Feels weak on right side.    No fevers.  Mild URI over Thanksgiving.  No recent travel.  No sick contacts.    No illicit drug use.    Occasional headaches.  No vision changes.   No chest pain or dyspnea.    No nausea, vomiting, diarrhea.  Normal bathroom habits.     Dizziness resolved - may have been anxiety that day due to the situation.    Family history - negative.  No new stressors.    \"Same old problems\" - \"same stress as usual\".    Did state brother was diagnosed with some type of hereditary condition and is now on Humira.  This was mentioned at close of visit, prior to lab draw.    Problem list and histories reviewed & adjusted, as indicated.  Additional history: as documented    No current outpatient medications on file.     No current " facility-administered medications for this visit.        There is no problem list on file for this patient.    Past Surgical History:   Procedure Laterality Date     APPENDECTOMY       wisdom teeth extraction         Social History     Tobacco Use     Smoking status: Former Smoker     Smokeless tobacco: Never Used   Substance Use Topics     Alcohol use: No     Family History   Problem Relation Age of Onset     Arrhythmia Maternal Grandmother      Heart Disease Maternal Grandfather      Heart Disease Paternal Grandfather            Reviewed and updated as needed this visit by clinical staff  Tobacco  Allergies  Meds  Problems  Med Hx  Surg Hx  Fam Hx  Soc Hx        Reviewed and updated as needed this visit by Provider  Tobacco  Allergies  Meds  Problems  Med Hx  Surg Hx  Fam Hx  Soc Hx          ROS:  Constitutional, HEENT, cardiovascular, pulmonary, GI, , musculoskeletal, neuro, skin, endocrine and psych systems are negative, except as otherwise noted.    OBJECTIVE:     /88 (BP Location: Right arm, Patient Position: Chair, Cuff Size: Adult Large)   Pulse 75   Temp 98.5  F (36.9  C) (Tympanic)   Wt 100.2 kg (221 lb)   SpO2 97%   BMI 28.37 kg/m    Body mass index is 28.37 kg/m .  GENERAL: healthy, alert and no distress  EYES: Eyes grossly normal to inspection, PERRL and conjunctivae and sclerae normal  NECK: no adenopathy, no asymmetry, masses, or scars and thyroid normal to palpation  RESP: lungs clear to auscultation - no rales, rhonchi or wheezes  CV: regular rate and rhythm, normal S1 S2, no S3 or S4, no murmur, click or rub, no peripheral edema and peripheral pulses strong  ABDOMEN: soft, nontender, no hepatosplenomegaly, no masses and bowel sounds normal  MS: normal muscle tone, normal range of motion and peripheral pulses normal  SKIN: no suspicious lesions or rashes  NEURO: Normal strength and tone, sensory exam grossly normal, mentation intact, speech normal, cranial nerves 2-12  intact, DTR's normal and symmetric upper and lower extremities, gait abnormal: cautious, a bit slow, antalgic, but able to do tandem gait, Romberg normal   PSYCH: mentation appears normal and affect flat    Diagnostic Test Results:  ER records reviewed.    Results for orders placed or performed in visit on 01/21/19 (from the past 24 hour(s))   CBC with platelets and differential   Result Value Ref Range    WBC 8.9 4.0 - 11.0 10e9/L    RBC Count 5.25 4.4 - 5.9 10e12/L    Hemoglobin 16.3 13.3 - 17.7 g/dL    Hematocrit 46.9 40.0 - 53.0 %    MCV 89 78 - 100 fl    MCH 31.0 26.5 - 33.0 pg    MCHC 34.8 31.5 - 36.5 g/dL    RDW 12.2 10.0 - 15.0 %    Platelet Count 216 150 - 450 10e9/L    Diff Method Automated Method     % Neutrophils 65.3 %    % Lymphocytes 18.9 %    % Monocytes 11.9 %    % Eosinophils 2.4 %    % Basophils 1.3 %    % Immature Granulocytes 0.2 %    Nucleated RBCs 0 0 /100    Absolute Neutrophil 5.8 1.6 - 8.3 10e9/L    Absolute Lymphocytes 1.7 0.8 - 5.3 10e9/L    Absolute Monocytes 1.1 0.0 - 1.3 10e9/L    Absolute Eosinophils 0.2 0.0 - 0.7 10e9/L    Absolute Basophils 0.1 0.0 - 0.2 10e9/L    Abs Immature Granulocytes 0.0 0 - 0.4 10e9/L    Absolute Nucleated RBC 0.0    CRP inflammation   Result Value Ref Range    CRP Inflammation <2.9 0.0 - 8.0 mg/L       ASSESSMENT/PLAN:     (R29.898) Weakness of right lower extremity  (primary encounter diagnosis)  Plan: CBC with platelets and differential, CRP         inflammation, Lyme Disease Silvia with reflex to         WB Serum, XR Cervical Spine 2/3 Views    (R29.898) Weakness of right upper extremity  Plan: CBC with platelets and differential, CRP         inflammation, Lyme Disease Silvia with reflex to         WB Serum, XR Cervical Spine 2/3 Views    (R20.2) Paresthesias  Plan: CBC with platelets and differential, CRP         inflammation, Lyme Disease Silvia with reflex to         WB Serum, XR Cervical Spine 2/3 Views    Was surprised that exam was relatively normal.   Strength did not seem significantly impacted, but per patient it does wax/wane.    Potential demyelinating process such as MS, potential mass/tumor, cervical spine lesion.  MRIs ordered.    Xray with mild degenerative changes.  Labs stable.  Will call with if lyme test positive.  MRI brain and cervical spine ordered.  UC/ER if progressive symptoms - left sided symptoms, fever, vision changes, bowel/bladder changes, etc.    Consider additional lab testing with new information about brother - he will find out name of condition.  Consider autoimmune markers.          Nuha Barrientos MD  [facility]

## 2019-01-21 NOTE — PATIENT INSTRUCTIONS
Xray with mild degenerative changes.  Labs stable.  Will call with if lyme test positive.  MRI brain and cervical spine ordered.  UC/ER if progressive symptoms - left sided symptoms, fever, vision changes, bowel/bladder changes, etc.

## 2019-01-21 NOTE — NURSING NOTE
"Chief Complaint   Patient presents with     ER F/U       Initial /88 (BP Location: Right arm, Patient Position: Chair, Cuff Size: Adult Large)   Pulse 75   Temp 98.5  F (36.9  C) (Tympanic)   Wt 100.2 kg (221 lb)   SpO2 97%   BMI 28.37 kg/m   Estimated body mass index is 28.37 kg/m  as calculated from the following:    Height as of 5/31/18: 1.88 m (6' 2\").    Weight as of this encounter: 100.2 kg (221 lb).  Medication Reconciliation: complete    Theresa Wilkins LPN    "

## 2019-01-23 LAB — B BURGDOR IGG+IGM SER QL: 0.01 (ref 0–0.89)

## 2019-01-24 ENCOUNTER — HOSPITAL ENCOUNTER (OUTPATIENT)
Dept: MRI IMAGING | Facility: HOSPITAL | Age: 34
Discharge: HOME | End: 2019-01-24
Attending: FAMILY MEDICINE | Admitting: FAMILY MEDICINE
Payer: COMMERCIAL

## 2019-01-24 ENCOUNTER — HOSPITAL ENCOUNTER (OUTPATIENT)
Dept: MRI IMAGING | Facility: HOSPITAL | Age: 34
End: 2019-01-24
Attending: FAMILY MEDICINE
Payer: COMMERCIAL

## 2019-01-24 DIAGNOSIS — R29.898 WEAKNESS OF RIGHT UPPER EXTREMITY: ICD-10-CM

## 2019-01-24 DIAGNOSIS — R20.2 PARESTHESIAS: ICD-10-CM

## 2019-01-24 DIAGNOSIS — R29.898 WEAKNESS OF RIGHT LOWER EXTREMITY: ICD-10-CM

## 2019-01-24 PROCEDURE — A9585 GADOBUTROL INJECTION: HCPCS | Performed by: RADIOLOGY

## 2019-01-24 PROCEDURE — 70553 MRI BRAIN STEM W/O & W/DYE: CPT | Mod: TC

## 2019-01-24 PROCEDURE — 25500064 ZZH RX 255 OP 636: Performed by: RADIOLOGY

## 2019-01-24 PROCEDURE — 72156 MRI NECK SPINE W/O & W/DYE: CPT | Mod: TC

## 2019-01-24 RX ORDER — GADOBUTROL 604.72 MG/ML
10 INJECTION INTRAVENOUS ONCE
Status: COMPLETED | OUTPATIENT
Start: 2019-01-24 | End: 2019-01-24

## 2019-01-24 RX ADMIN — GADOBUTROL 10 ML: 604.72 INJECTION INTRAVENOUS at 18:27

## 2019-01-25 ENCOUNTER — OFFICE VISIT (OUTPATIENT)
Dept: FAMILY MEDICINE | Facility: OTHER | Age: 34
End: 2019-01-25
Attending: FAMILY MEDICINE
Payer: COMMERCIAL

## 2019-01-25 VITALS
BODY MASS INDEX: 28.04 KG/M2 | SYSTOLIC BLOOD PRESSURE: 130 MMHG | WEIGHT: 218.4 LBS | TEMPERATURE: 100 F | OXYGEN SATURATION: 97 % | HEART RATE: 95 BPM | DIASTOLIC BLOOD PRESSURE: 84 MMHG

## 2019-01-25 DIAGNOSIS — G35 MULTIPLE SCLEROSIS (H): Primary | ICD-10-CM

## 2019-01-25 PROCEDURE — 99214 OFFICE O/P EST MOD 30 MIN: CPT | Performed by: FAMILY MEDICINE

## 2019-01-25 ASSESSMENT — PAIN SCALES - GENERAL: PAINLEVEL: SEVERE PAIN (6)

## 2019-01-25 NOTE — NURSING NOTE
"Chief Complaint   Patient presents with     Results     Brain MRI       Initial /84 (BP Location: Left arm, Patient Position: Chair, Cuff Size: Adult Large)   Pulse 95   Temp 100  F (37.8  C) (Tympanic)   Wt 99.1 kg (218 lb 6.4 oz)   SpO2 97%   BMI 28.04 kg/m   Estimated body mass index is 28.04 kg/m  as calculated from the following:    Height as of 5/31/18: 1.88 m (6' 2\").    Weight as of this encounter: 99.1 kg (218 lb 6.4 oz).  Medication Reconciliation: complete    Theresa Wilkins LPN      "

## 2019-01-25 NOTE — PATIENT INSTRUCTIONS
Patient Education     Understanding Multiple Sclerosis (MS)  Multiple sclerosis (MS) is a disease of the brain and spinal cord. Unfortunately, there is no cure for MS. But there are many treatments available. Many people with MS can manage their symptoms and lead active, healthy lives. Read on to learn more about MS and its treatments.    What is MS?  The brain is the body s control center. Each part of the brain controls specific functions. These include movement, balance, sensation, and reasoning. The brain controls these functions by sending and receiving messages through nerves. Nerves have a protective covering (myelin). With MS, the myelin on nerves in the brain and spinal cord is damaged. The loss of this covering causes messages traveling along affected nerves to slow or stop. This results in MS symptoms.  Causes and risk factors for MS  Experts don't know what causes MS. But most research suggests that the body s immune system attacks and destroys myelin by mistake. MS most often begins in adults between ages 20 and 40. It happens in women more often than men. It also is more likely to occur if a person has a family history of MS. Smoking does not cause MS, but can make it worse.    Types of MS  There are 4 main types of MS. These are:    Relapsing-remitting MS. This type of MS is the most common. It is marked by isolated episodes of symptoms (also called attacks or flare-ups). Periods of partial or complete recovery follow these episodes. Each attack may be worse than the one before it.    Primary-progressive MS. This type of MS is marked by a slow onset of symptoms that gradually worsen over time. There are no periods of recovery.    Secondary-progressive MS. This type of MS begins as relapsing-remitting MS. After a period of stability, the disease steadily gets worse. About 50% of people with relapsing-remitting MS have secondary-progressive MS within 10 years of their first attack. About all of them  have it within 25 years.    Progressive-relapsing MS. This type of MS includes both slowly progressive symptoms and periods of flare-ups together.   Symptoms of MS  MS symptoms vary from person to person. The type of symptoms a person has depends on which nerves are affected. It also depends on how much nerve damage there is in the brain and spinal cord. A person can also have different symptoms during the course of the disease. Symptoms can include:    Extreme tiredness (fatigue)    Numbness, tingling, or loss of feeling    Pain    Muscle spasms or weakness in the arms, legs, or both    Vision problems, such as rapid eye movements, double vision, or vision loss    Balance and coordination problems    Problems walking or moving the arms, legs, or both    Bowel and bladder control problems    Problems with sexual function    Dizziness    Trouble concentrating, focusing, or remembering things    Trouble reasoning and solving problems    Trouble speaking or swallowing    Depression  Diagnosing MS  MS can be hard to diagnose. Symptoms come and go. They also may seem like those of other health problems. A diagnosis of MS is not made unless a person has had at least 2 or more separate episodes of MS symptoms. To confirm a diagnosis of MS, your healthcare provider will take a detailed history of symptoms. He or she will also give you a neurologic exam to check your muscle strength, balance, coordination, and reflexes. Skills such as thinking, memory, vision, hearing, and talking are also checked. In addition, healthcare providers may also give you the following tests:    MRI. This test provides detailed pictures of the brain and spinal cord. It helps check for areas of damaged nerves. These are often referred to as lesions or plaques.    Visual evoked potentials. This test is done to see how well your optic nerves are working.    Spinal tap (also called lumbar puncture). This test checks the health of the fluid around your  brain and spinal cord for signs of nerve sheath damage (demyelination).    Blood tests. These help rule out other causes of the symptoms.  Treating MS  The goal of treatment is to manage your symptoms and slow the rate at which the disease worsens. You can manage your symptoms in 1 or more of the following ways:    Medicines. Some medicines help keep your body s immune system from attacking the myelin. This may reduce the frequency and severity of attacks. Other medicines help control symptoms or relieve pain when attacks happen.    Rehabilitation (rehab). Symptoms or problems due to MS can interfere with daily living. Rehab includes physical, occupational, or speech therapy. This can help you maintain strength and function. If needed, your healthcare provider will prescribe aids such as canes, walkers, or wheelchairs. You can also make changes in your work or living space to improve your safety.    Supportive services. These include counseling and support groups to help you cope with the challenges of living with MS. Family members and friends may also benefit from these services.    Lifestyle changes. Making certain changes in your lifestyle and daily routine may help you manage symptoms. This includes getting enough rest and regular exercise. It also includes eating healthy foods and reducing stress. It's helpful to figure out and stay away from things that trigger MS episodes.    Other treatments. Researchers are exploring new treatments for MS. Many of these are in clinical trials. This means they are being tested for safety and effectiveness. Your healthcare providers can give you more information about any treatments that might be an option for you.  Long-term concerns  MS is an unpredictable disease. Your experience will be different from other people's experiences. In general, if you have MS, you should have regular visits with your healthcare provider. He or she will watch your symptoms. Your healthcare  provider will review how well your medicines and other treatments work. MS symptoms may get worse as your disease progresses. If this happens, you may need more aggressive care and treatments. Visit the resources below to learn more about MS and what advances researchers are making to find a cure:    National Multiple Sclerosis Society, www.nationalmssociety.org    Multiple Sclerosis [employer] of [place], www.mymsaa.org  Date Last Reviewed: 2/1/2018 2000-2018 [employer]. [address] [place] [address], [place] [address]. All rights reserved. This information is not intended as a substitute for professional medical care. Always follow your healthcare professional's instructions.

## 2019-01-25 NOTE — PROGRESS NOTES
SUBJECTIVE:   Reese Sanchez is a 33 year old male who presents to clinic today for the following health issues:    Follow up - Results       Duration: MRI done 1/24/19    Description (location/character/radiation): Brain MRI. In office to discuss results with Dr. Hobbs.     Intensity:  None    Accompanying signs and symptoms: Weakness of right lower extremity, weakness of right upper extremity.    History (similar episodes/previous evaluation): None    Precipitating or alleviating factors: None    Therapies tried and outcome: None      Symptoms still just right side; leg more than right; pain in shoulder and arm; leg not functioning/weak    No falls    Upper URI recently; unsure of fever or not    No change in bowel bladder function    No vision change    Reports brother with ankylosing spondylitis         Problem list and histories reviewed & adjusted, as indicated.  Additional history: as documented    No current outpatient medications on file.     No current facility-administered medications for this visit.        There is no problem list on file for this patient.    Past Surgical History:   Procedure Laterality Date     APPENDECTOMY       wisdom teeth extraction         Social History     Tobacco Use     Smoking status: Former Smoker     Smokeless tobacco: Never Used   Substance Use Topics     Alcohol use: No     Family History   Problem Relation Age of Onset     Arrhythmia Maternal Grandmother      Heart Disease Maternal Grandfather      Heart Disease Paternal Grandfather            Reviewed and updated as needed this visit by clinical staff       Reviewed and updated as needed this visit by Provider         ROS:  Constitutional, HEENT, cardiovascular, pulmonary, gi and gu systems are negative, except as otherwise noted.    OBJECTIVE:     /84 (BP Location: Left arm, Patient Position: Chair, Cuff Size: Adult Large)   Pulse 95   Temp 100  F (37.8  C) (Tympanic)   Wt 99.1 kg (218 lb 6.4 oz)   SpO2  97%   BMI 28.04 kg/m    Body mass index is 28.04 kg/m .  GENERAL: healthy, alert and no distress  EYES: Eyes grossly normal to inspection, PERRL and conjunctivae and sclerae normal  NECK: no adenopathy, no asymmetry, masses, or scars and thyroid normal to palpation  RESP: lungs clear to auscultation - no rales, rhonchi or wheezes  CV: regular rate and rhythm, normal S1 S2, no S3 or S4, no murmur, click or rub, no peripheral edema and peripheral pulses strong  MS/NEURO: normal muscle tone, peripheral pulses normal and normal AROM extremities; strength is 4/5 right LE flexion, foot flexion; remainder 5/5 symmetric; reflexes symmetric; sensation symmetric and grossly intact  SKIN: no suspicious lesions or rashes  PSYCH: mentation appears normal, affect normal/bright    Diagnostic Test Results:  Results for orders placed or performed during the hospital encounter of 01/24/19   MR Brain w/o & w Contrast    Narrative    PROCEDURE: MR BRAIN W/O & W CONTRAST 1/24/2019 6:55 PM    HISTORY: Neuro deficit(s), subacute; Weakness of right lower  extremity; Weakness of right upper extremity; Paresthesias    COMPARISONS: None.    Meds/Dose Given: Gadavist  10 mL    TECHNIQUE: Images were obtained sagittally T1 weighted. Images were  obtained axially diffusion gradient echo FLAIR T1 and T2 weighted.  Images were obtained axially sagittally coronally T1 weighted  following gadolinium administration    FINDINGS: There is an enhancing lesion seen in the left parietal  convexity measuring 1.7 cm in diameter with some surrounding edema.  There are several other white matter lesions seen in both hemispheres  these other white matter lesions have no pathologic enhancement. No  brainstem or cerebellar lesions are noted. The pituitary optic chiasm  and basal cisterns appear normal. There is mucosal thickening seen in  the maxillary sinuses.         Impression    IMPRESSION: Enhancing lesion in the left parietal convexity with  several other  white matter lesions seen in both hemispheres. The  target appearance of the left parietal lesion is suspicious for  tumefactive multiple sclerosis. Malignancy cannot be excluded.    LOLITA FOX MD       ASSESSMENT/PLAN:     (G35) Multiple sclerosis (H)  (primary encounter diagnosis)  Comment: new diagnosis; large lesion - tumefactive - 1.7 cm; case discussed with [facility] on call neurologist, Dr. Valdez.  No urgent treatment.  Steroids will not change end result, and this flare seems to be resolving; will get patient in for consult within next couple of weeks; UC/ER if new or progressive symptoms  Plan: NEUROLOGY ADULT REFERRAL          Imaging reports, diagnosis, further testing/evaluation, etc discussed with patient.  Questions answered.    [facility] contacted neurology to schedule consult - will send all records and staff will contact Reese to schedule.    25 minutes spent with patient, over 50% in direct counseling.      Nuha Barrientos MD  [facility] [place]

## 2019-01-29 ENCOUNTER — MEDICAL CORRESPONDENCE (OUTPATIENT)
Dept: HEALTH INFORMATION MANAGEMENT | Facility: CLINIC | Age: 34
End: 2019-01-29

## 2019-01-29 DIAGNOSIS — G35 MS (MULTIPLE SCLEROSIS) (H): ICD-10-CM

## 2019-01-30 ENCOUNTER — INFUSION THERAPY VISIT (OUTPATIENT)
Dept: INFUSION THERAPY | Facility: OTHER | Age: 34
End: 2019-01-30
Attending: FAMILY MEDICINE
Payer: COMMERCIAL

## 2019-01-30 VITALS
HEIGHT: 74 IN | WEIGHT: 219.2 LBS | OXYGEN SATURATION: 96 % | RESPIRATION RATE: 16 BRPM | HEART RATE: 87 BPM | DIASTOLIC BLOOD PRESSURE: 90 MMHG | SYSTOLIC BLOOD PRESSURE: 120 MMHG | BODY MASS INDEX: 28.13 KG/M2 | TEMPERATURE: 100 F

## 2019-01-30 DIAGNOSIS — G35 MS (MULTIPLE SCLEROSIS) (H): Primary | ICD-10-CM

## 2019-01-30 PROCEDURE — 96365 THER/PROPH/DIAG IV INF INIT: CPT | Performed by: FAMILY MEDICINE

## 2019-01-30 RX ADMIN — Medication 250 ML: at 14:33

## 2019-01-30 ASSESSMENT — MIFFLIN-ST. JEOR: SCORE: 2014.28

## 2019-01-30 NOTE — PROGRESS NOTES
Patient tolerated infusion well, no signs or symptoms of adverse reaction noted.  Patient denies pain nor discomfort.     IV removed, catheter intact.  Site clean, dry and intact.  No signs or symptoms of infiltration or infection.  Covered with a sterile bandage, slight pressure applied for 30 seconds.  Pt instructed to leave bandage intact for a minimum of one hour, and to call with questions or concerns.  Copy of appointments, discharge instructions, and after visit summary (AVS) provided to patient.  Patient states understanding, discharged ambulatory.

## 2019-01-30 NOTE — PROGRESS NOTES
Patient is a 34 y/o male here accompanied by self today for infusion of solumedrol per order of Dr. Hobbs.  Patient meets parameters for today's infusion. Patient identified with two identifiers, order verified, and verbal consent for today's infusion obtained from patient.         22 gauge angio cath inserted into right hand after 1 unsuccessful attempt.  Immediate blood return noted.  IV secured with sterile, transparent dressing and tape.  Patient tolerated well, denies pain or discomfort at this time.  Flushes easily without resistance, no signs or symptoms of infiltration or infection.   Patient denies questions or concerns regarding infusion and/or medication(s) being administered.        7383 IV pump verified with  Solu-medrol 1000 mg dose, drug, and rate of administration.  Infusion administered per protocol.

## 2019-01-30 NOTE — PATIENT INSTRUCTIONS
Patient Education     Methylprednisolone Solution for Injection  Brand Names: A-Methapred, Solu-Medrol  What is this medicine?  METHYLPREDNISOLONE (meth ill pred NISS oh lone) is a corticosteroid. It is commonly used to treat inflammation of the skin, joints, lungs, and other organs. Common conditions treated include asthma, allergies, and arthritis. It is also used for other conditions, such as blood disorders and diseases of the adrenal glands.  How should I use this medicine?  This medicine is for injection or infusion into a vein. It is also for injection into a muscle. It is given by a health care professional in a hospital or clinic setting.  Talk to your pediatrician regarding the use of this medicine in children. While this drug may be prescribed for selected conditions, precautions do apply.  What side effects may I notice from receiving this medicine?  Side effects that you should report to your doctor or health care professional as soon as possible:    allergic reactions like skin rash, itching or hives, swelling of the face, lips, or tongue    bloody or tarry stools    changes in vision    hallucination, loss of contact with reality    muscle cramps    muscle pain    palpitations    signs and symptoms of high blood sugar such as dizziness; dry mouth; dry skin; fruity breath; nausea; stomach pain; increased hunger or thirst; increased urination    signs and symptoms of infection like fever or chills; cough; sore throat; pain or trouble passing urine    trouble passing urine or change in the amount of urine  Side effects that usually do not require medical attention (report to your doctor or health care professional if they continue or are bothersome):    changes in emotions or mood    constipation    diarrhea    excessive hair growth on the face or body    headache    nausea, vomiting    pain, redness, or irritation at site where injected    trouble sleeping    weight gain  What may interact with this  medicine?  Do not take this medicine with any of the following medications:    alefacept    echinacea    iopamidol    live virus vaccines    metyrapone    mifepristone  This medicine may also interact with the following medications:    amphotericin B    aspirin and aspirin-like medicines    certain antibiotics like erythromycin, clarithromycin, troleandomycin    certain medicines for diabetes    certain medicines for fungal infection like ketoconazole    certain medicines for seizures like carbamazepine, phenobarbital, phenytoin    certain medicines that treat or prevent blood clots like warfarin    cyclosporine    digoxin    diuretics    female hormones, like estrogens and birth control pills    isoniazid    NSAIDS, medicines for pain and inflammation, like ibuprofen or naproxen    other medicines for myasthenia gravis    rifampin    vaccines  What if I miss a dose?  This does not apply.  Where should I keep my medicine?  This drug is given in a hospital or clinic and will not be stored at home.  What should I tell my health care provider before I take this medicine?  They need to know if you have any of these conditions:    Cushing's syndrome    eye disease, vision problems    diabetes    glaucoma    heart disease    high blood pressure    infection (especially a virus infection such as chickenpox, cold sores, or herpes)    liver disease    mental illness    myasthenia gravis    osteoporosis    recently received or scheduled to receive a vaccine    seizures    stomach or intestine problems    thyroid disease    an unusual or allergic reaction to lactose, methylprednisolone, other medicines, foods, dyes, or preservatives    pregnant or trying to get pregnant    breast-feeding  What should I watch for while using this medicine?  Tell your doctor or healthcare professional if your symptoms do not start to get better or if they get worse. Do not stop taking except on your doctor's advice. You may develop a severe  reaction. Your doctor will tell you how much medicine to take.  Your condition will be monitored carefully while you are receiving this medicine.  This medicine may increase your risk of getting an infection. Tell your doctor or health care professional if you are around anyone with measles or chickenpox, or if you develop sores or blisters that do not heal properly.  This medicine may affect blood sugar levels. If you have diabetes, check with your doctor or health care professional before you change your diet or the dose of your diabetic medicine.  Tell your doctor or health care professional right away if you have any change in your eyesight.  Using this medicine for a long time may increase your risk of low bone mass. Talk to your doctor about bone health.  NOTE:This sheet is a summary. It may not cover all possible information. If you have questions about this medicine, talk to your doctor, pharmacist, or health care provider. Copyright  2018 [employer]

## 2019-01-31 ENCOUNTER — INFUSION THERAPY VISIT (OUTPATIENT)
Dept: INFUSION THERAPY | Facility: OTHER | Age: 34
End: 2019-01-31
Attending: FAMILY MEDICINE
Payer: COMMERCIAL

## 2019-01-31 VITALS
SYSTOLIC BLOOD PRESSURE: 122 MMHG | TEMPERATURE: 98.3 F | OXYGEN SATURATION: 96 % | HEART RATE: 87 BPM | DIASTOLIC BLOOD PRESSURE: 74 MMHG | RESPIRATION RATE: 18 BRPM

## 2019-01-31 DIAGNOSIS — G35 MS (MULTIPLE SCLEROSIS) (H): Primary | ICD-10-CM

## 2019-01-31 PROCEDURE — 96365 THER/PROPH/DIAG IV INF INIT: CPT | Performed by: FAMILY MEDICINE

## 2019-01-31 NOTE — PATIENT INSTRUCTIONS
Patient Education     Methylprednisolone Acetate Suspension for injection  What is this medicine?  METHYLPREDNISOLONE (meth ill pred NISS oh lone) is a corticosteroid. It is commonly used to treat inflammation of the skin, joints, lungs, and other organs. Common conditions treated include asthma, allergies, and arthritis. It is also used for other conditions, such as blood disorders and diseases of the adrenal glands.  This medicine may be used for other purposes; ask your health care provider or pharmacist if you have questions.  What should I tell my health care provider before I take this medicine?  They need to know if you have any of these conditions:    cataracts or glaucoma    Cushings    heart disease    high blood pressure    infection including tuberculosis    low calcium or potassium levels in the blood    recent surgery    seizures    stomach or intestinal disease, including colitis    threadworms    thyroid problems    an unusual or allergic reaction to methylprednisolone, corticosteroids, benzyl alcohol, other medicines, foods, dyes, or preservatives    pregnant or trying to get pregnant    breast-feeding  How should I use this medicine?  This medicine is for injection into a muscle, joint, or other tissue. It is given by a health care professional in a hospital or clinic setting.  Talk to your pediatrician regarding the use of this medicine in children. While this drug may be prescribed for selected conditions, precautions do apply.  Overdosage: If you think you have taken too much of this medicine contact a poison control center or emergency room at once.  NOTE: This medicine is only for you. Do not share this medicine with others.  What if I miss a dose?  This does not apply.  What may interact with this medicine?  Do not take this medicine with any of the following medications:    mifepristone  This medicine may also interact with the following medications:    aspirin and aspirin-like  medicines    cyclosporin    ketoconazole    phenobarbital    phenytoin    rifampin    tacrolimus    troleandomycin    vaccines    warfarin  This list may not describe all possible interactions. Give your health care provider a list of all the medicines, herbs, non-prescription drugs, or dietary supplements you use. Also tell them if you smoke, drink alcohol, or use illegal drugs. Some items may interact with your medicine.  What should I watch for while using this medicine?  Visit your doctor or health care professional for regular checks on your progress. If you are taking this medicine for a long time, carry an identification card with your name and address, the type and dose of your medicine, and your doctor's name and address.  The medicine may increase your risk of getting an infection. Stay away from people who are sick. Tell your doctor or health care professional if you are around anyone with measles or chickenpox.  You may need to avoid some vaccines. Talk to your health care provider for more information.  If you are going to have surgery, tell your doctor or health care professional that you have taken this medicine within the last twelve months.  Ask your doctor or health care professional about your diet. You may need to lower the amount of salt you eat.  The medicine can increase your blood sugar. If you are a diabetic check with your doctor if you need help adjusting the dose of your diabetic medicine.  What side effects may I notice from receiving this medicine?  Side effects that you should report to your doctor or health care professional as soon as possible:    allergic reactions like skin rash, itching or hives, swelling of the face, lips, or tongue    bloody or tarry stools    changes in vision    eye pain or bulging eyes    fever, sore throat, sneezing, cough, or other signs of infection, wounds that will not heal    increased thirst    irregular heartbeat    muscle cramps    pain in hips,  back, ribs, arms, shoulders, or legs    swelling of the ankles, feet, hands    trouble passing urine or change in the amount of urine    unusual bleeding or bruising    unusually weak or tired    weight gain or weight loss  Side effects that usually do not require medical attention (report to your doctor or health care professional if they continue or are bothersome):    changes in emotions or moods    constipation or diarrhea    headache    irritation at site where injected    nausea, vomiting    skin problems, acne, thin and shiny skin    trouble sleeping    unusual hair growth on the face or body  This list may not describe all possible side effects. Call your doctor for medical advice about side effects. You may report side effects to FDA at 5-956-FDA-1358.  Where should I keep my medicine?  This drug is given in a hospital or clinic and will not be stored at home.  NOTE:This sheet is a summary. It may not cover all possible information. If you have questions about this medicine, talk to your doctor, pharmacist, or health care provider. Copyright  2016 [employer]

## 2019-01-31 NOTE — PROGRESS NOTES
Infusion administered per protocol.  Patient tolerated infusion well, no signs or symptoms of adverse reaction noted.  Patient denies pain nor discomfort.     IV removed, catheter intact.  Site clean, dry and intact.  No signs or symptoms of infiltration or infection.  Covered with a sterile bandage, slight pressure applied for 30 seconds.  Pt instructed to leave bandage intact for a minimum of one hour, and to call with questions or concerns.   Patient states understanding, discharged ambulatory.  Declined AVS.

## 2019-02-01 ENCOUNTER — INFUSION THERAPY VISIT (OUTPATIENT)
Dept: INFUSION THERAPY | Facility: OTHER | Age: 34
End: 2019-02-01
Attending: FAMILY MEDICINE
Payer: COMMERCIAL

## 2019-02-01 VITALS
DIASTOLIC BLOOD PRESSURE: 78 MMHG | WEIGHT: 223.7 LBS | RESPIRATION RATE: 16 BRPM | SYSTOLIC BLOOD PRESSURE: 118 MMHG | OXYGEN SATURATION: 95 % | HEIGHT: 74 IN | HEART RATE: 85 BPM | BODY MASS INDEX: 28.71 KG/M2 | TEMPERATURE: 98.8 F

## 2019-02-01 DIAGNOSIS — G35 MS (MULTIPLE SCLEROSIS) (H): Primary | ICD-10-CM

## 2019-02-01 PROCEDURE — 96365 THER/PROPH/DIAG IV INF INIT: CPT | Performed by: FAMILY MEDICINE

## 2019-02-01 RX ORDER — NICOTINE POLACRILEX 4 MG
15-30 LOZENGE BUCCAL
Status: CANCELLED | OUTPATIENT
Start: 2019-02-01

## 2019-02-01 RX ORDER — DEXTROSE MONOHYDRATE 25 G/50ML
25-50 INJECTION, SOLUTION INTRAVENOUS
Status: CANCELLED | OUTPATIENT
Start: 2019-02-01

## 2019-02-01 RX ORDER — LIDOCAINE HYDROCHLORIDE 10 MG/ML
10 INJECTION, SOLUTION EPIDURAL; INFILTRATION; INTRACAUDAL; PERINEURAL ONCE
Status: CANCELLED | OUTPATIENT
Start: 2019-02-07

## 2019-02-01 ASSESSMENT — MIFFLIN-ST. JEOR: SCORE: 2034.45

## 2019-02-01 NOTE — PROGRESS NOTES
Patient is a 33 year old male here accompanied by self today for infusion of methylprednisolone per order of Dr. Hobbs.  Patient meets parameters for today's infusion. Patient identified with two identifiers, order verified, and verbal consent for today's infusion obtained from patient.      22 gauge angio cath inserted into right AS.  Immediate blood return noted.  IV secured with sterile, transparent dressing and tape.  Patient tolerated well, denies pain or discomfort at this time.  Flushes easily without resistance, no signs or symptoms of infiltration or infection.   Patient denies questions or concerns regarding infusion and/or medication(s) being administered.    1417: IV pump verified with methylprednisolone 1000mg dose, drug, and rate of administration with MAR and medication label.  Infusion administered per protocol.  Patient tolerated infusion well, no signs or symptoms of adverse reaction noted.  Patient denies pain nor discomfort.     IV removed, catheter intact.  Site clean, dry and intact.  No signs or symptoms of infiltration or infection.  Covered with a sterile bandage, slight pressure applied for 30 seconds.  Pt instructed to leave bandage intact for a minimum of one hour, and to call with questions or concerns.   Patient states understanding, discharged ambulatory.

## 2019-02-01 NOTE — PATIENT INSTRUCTIONS
Patient Education     Methylprednisolone Acetate Suspension for injection  What is this medicine?  METHYLPREDNISOLONE (meth ill pred NISS oh lone) is a corticosteroid. It is commonly used to treat inflammation of the skin, joints, lungs, and other organs. Common conditions treated include asthma, allergies, and arthritis. It is also used for other conditions, such as blood disorders and diseases of the adrenal glands.  This medicine may be used for other purposes; ask your health care provider or pharmacist if you have questions.  What should I tell my health care provider before I take this medicine?  They need to know if you have any of these conditions:    cataracts or glaucoma    Cushings    heart disease    high blood pressure    infection including tuberculosis    low calcium or potassium levels in the blood    recent surgery    seizures    stomach or intestinal disease, including colitis    threadworms    thyroid problems    an unusual or allergic reaction to methylprednisolone, corticosteroids, benzyl alcohol, other medicines, foods, dyes, or preservatives    pregnant or trying to get pregnant    breast-feeding  How should I use this medicine?  This medicine is for injection into a muscle, joint, or other tissue. It is given by a health care professional in a hospital or clinic setting.  Talk to your pediatrician regarding the use of this medicine in children. While this drug may be prescribed for selected conditions, precautions do apply.  Overdosage: If you think you have taken too much of this medicine contact a poison control center or emergency room at once.  NOTE: This medicine is only for you. Do not share this medicine with others.  What if I miss a dose?  This does not apply.  What may interact with this medicine?  Do not take this medicine with any of the following medications:    mifepristone  This medicine may also interact with the following medications:    aspirin and aspirin-like  medicines    cyclosporin    ketoconazole    phenobarbital    phenytoin    rifampin    tacrolimus    troleandomycin    vaccines    warfarin  This list may not describe all possible interactions. Give your health care provider a list of all the medicines, herbs, non-prescription drugs, or dietary supplements you use. Also tell them if you smoke, drink alcohol, or use illegal drugs. Some items may interact with your medicine.  What should I watch for while using this medicine?  Visit your doctor or health care professional for regular checks on your progress. If you are taking this medicine for a long time, carry an identification card with your name and address, the type and dose of your medicine, and your doctor's name and address.  The medicine may increase your risk of getting an infection. Stay away from people who are sick. Tell your doctor or health care professional if you are around anyone with measles or chickenpox.  You may need to avoid some vaccines. Talk to your health care provider for more information.  If you are going to have surgery, tell your doctor or health care professional that you have taken this medicine within the last twelve months.  Ask your doctor or health care professional about your diet. You may need to lower the amount of salt you eat.  The medicine can increase your blood sugar. If you are a diabetic check with your doctor if you need help adjusting the dose of your diabetic medicine.  What side effects may I notice from receiving this medicine?  Side effects that you should report to your doctor or health care professional as soon as possible:    allergic reactions like skin rash, itching or hives, swelling of the face, lips, or tongue    bloody or tarry stools    changes in vision    eye pain or bulging eyes    fever, sore throat, sneezing, cough, or other signs of infection, wounds that will not heal    increased thirst    irregular heartbeat    muscle cramps    pain in hips,  back, ribs, arms, shoulders, or legs    swelling of the ankles, feet, hands    trouble passing urine or change in the amount of urine    unusual bleeding or bruising    unusually weak or tired    weight gain or weight loss  Side effects that usually do not require medical attention (report to your doctor or health care professional if they continue or are bothersome):    changes in emotions or moods    constipation or diarrhea    headache    irritation at site where injected    nausea, vomiting    skin problems, acne, thin and shiny skin    trouble sleeping    unusual hair growth on the face or body  This list may not describe all possible side effects. Call your doctor for medical advice about side effects. You may report side effects to FDA at 4-217-FDA-3758.  Where should I keep my medicine?  This drug is given in a hospital or clinic and will not be stored at home.  NOTE:This sheet is a summary. It may not cover all possible information. If you have questions about this medicine, talk to your doctor, pharmacist, or health care provider. Copyright  2016 Gold Standard

## 2019-02-04 ENCOUNTER — HOSPITAL ENCOUNTER (EMERGENCY)
Facility: HOSPITAL | Age: 34
Discharge: HOME | End: 2019-02-04
Attending: PHYSICIAN ASSISTANT | Admitting: PHYSICIAN ASSISTANT
Payer: COMMERCIAL

## 2019-02-04 VITALS
RESPIRATION RATE: 14 BRPM | SYSTOLIC BLOOD PRESSURE: 151 MMHG | DIASTOLIC BLOOD PRESSURE: 102 MMHG | OXYGEN SATURATION: 97 % | TEMPERATURE: 97.2 F

## 2019-02-04 DIAGNOSIS — H53.8 BLURRED VISION: ICD-10-CM

## 2019-02-04 PROCEDURE — 99282 EMERGENCY DEPT VISIT SF MDM: CPT | Mod: Z6 | Performed by: PHYSICIAN ASSISTANT

## 2019-02-04 PROCEDURE — 99281 EMR DPT VST MAYX REQ PHY/QHP: CPT

## 2019-02-04 ASSESSMENT — ENCOUNTER SYMPTOMS
EYE ITCHING: 0
EYE REDNESS: 0
WEAKNESS: 1
PHOTOPHOBIA: 0
EYE PAIN: 0
FEVER: 0
EYE DISCHARGE: 0
CHILLS: 0

## 2019-02-04 NOTE — ED NOTES
Reviewed discharge instructions with pt, verbalized understanding, NO questions offered at time of discharge, copy of AVS provided prior to leaving. Encouraged to f/u as indicated. Refused vitals prior to leaving.

## 2019-02-04 NOTE — ED NOTES
"Pt to ER with c/o \"blurred vision; vision is blurred once out of arm,'s reach.\" Recently had steroid treatment infusion at the end opf last week. \"Thinks it was on the information sheet, that this could be a side effects, but unsure, just wants to be checked out.\" Also currently in the process of r/o MS, with his PCP. Denies pain. Sitting in chair. Call light placed within reach.   "

## 2019-02-04 NOTE — ED AVS SNAPSHOT
[facility]  [address] [place] [id number]  Phone:  [phone]                                    Reese Sanchez   MRN: [id number]    Department:  [facility]   Date of Visit:  2/4/2019           After Visit Summary Signature Page    I have received my discharge instructions, and my questions have been answered. I have discussed any challenges I see with this plan with the nurse or doctor.    ..........................................................................................................................................  Patient/Patient Representative Signature      ..........................................................................................................................................  Patient Representative Print Name and Relationship to Patient    ..................................................               ................................................  Date                                   Time    ..........................................................................................................................................  Reviewed by Signature/Title    ...................................................              ..............................................  Date                                               Time          22EPIC Rev 08/18

## 2019-02-04 NOTE — DISCHARGE INSTRUCTIONS
I think your symptoms are likely secondary to optic neuritis or other similar etiology.  Double vision can be very difficult to work-up in the ER.  Usually once you've rule out glaucoma or other infection, we usually concentrate on an outpatient MRI to rule out neurodegenerative disease.  Your MRI has already been done recently and, of course was not normal.  I would suggest a comprehensive eye exam in 1-2 days.  I do not believe that any lab work would be of benefit at this time. Please return here for ANY other concerns.

## 2019-02-05 NOTE — ED PROVIDER NOTES
History     Chief Complaint   Patient presents with     visual disturbance     c/o blurred vision when trying to read from a distance. denies blurred vision close up. states had a steroid infusion on wed, thurs, fri due to possible ms. states read that blurred vision would be a possibility from the tx.     The history is provided by the patient.     Reese Sanchez is a 33 year old male who presented to the emergency department ambulatory for evaluation of a 2-day history of blurred vision.  Patient reports the blurred vision is bilateral and only apparent at approximately 10 feet.  He can see up close.  Denies any visual field loss.  Denies any headaches.  Past medical history is most significant for likely multiple sclerosis.  He was seen in this emergency department recently underwent MRI of the brain that was grossly abnormal.  He is recently been on IV steroids Wednesday, Thursday, and Friday.  Denies any fevers or chills.  Denies any vomiting.  Denies any headaches.    Allergies:  No Known Allergies    Problem List:    Patient Active Problem List    Diagnosis Date Noted     MS (multiple sclerosis) (H) 01/29/2019     Priority: Medium        Past Medical History:    No past medical history on file.    Past Surgical History:    Past Surgical History:   Procedure Laterality Date     APPENDECTOMY       wisdom teeth extraction         Family History:    Family History   Problem Relation Age of Onset     Arrhythmia Maternal Grandmother      Heart Disease Maternal Grandfather      Heart Disease Paternal Grandfather        Social History:  Marital Status:   [2]  Social History     Tobacco Use     Smoking status: Former Smoker     Smokeless tobacco: Never Used   Substance Use Topics     Alcohol use: No     Drug use: No        Medications:      No current outpatient medications on file.      Review of Systems   Constitutional: Negative for chills and fever.   Eyes: Positive for visual disturbance. Negative for  photophobia, pain, discharge, redness and itching.   Neurological: Positive for weakness.        Chronic        Physical Exam   BP: (!) 151/102  Heart Rate: 74  Temp: 97.2  F (36.2  C)  Resp: 14  SpO2: 97 %      Physical Exam   Constitutional: He is oriented to person, place, and time. He appears well-developed and well-nourished. No distress.   Pleasant and talkative   Eyes: Conjunctivae and EOM are normal. Pupils are equal, round, and reactive to light.   No evidence of hyphema or hypopyon.  Sclera are white.  Conjunctiva are not injected.  Extraocular movements are normal.  No evidence of nystagmus.   Neurological: He is alert and oriented to person, place, and time.   Skin: Skin is warm and dry. Capillary refill takes less than 2 seconds.   Psychiatric: He has a normal mood and affect.   Nursing note and vitals reviewed.      ED Course        Procedures               Critical Care time:  none               No results found for this or any previous visit (from the past 24 hour(s)).    Medications - No data to display    Assessments & Plan (with Medical Decision Making)   We had a long and detailed discussion.  Symptoms are almost certainly secondary to optic neuritis secondary to neurodegenerative disease.  Discussed other possibilities.  Emergency room workup is difficult.  No significant red flags.  I believe that he would benefit most from complains of eye exam in the next 1-2 days.  Follow-up with primary care.  Return here for any worsening symptoms or new symptoms.      This document was prepared using a combination of typing and voice generated software.  While every attempt was made for accuracy, spelling and grammatical errors may exist.    I have reviewed the nursing notes.    I have reviewed the findings, diagnosis, plan and need for follow up with the patient.          Medication List      There are no discharge medications for this visit.         Final diagnoses:   Blurred vision       2/4/2019   [facility]     Tushar Rodriguez PA-C  02/04/19 3550

## 2019-02-06 ENCOUNTER — TELEPHONE (OUTPATIENT)
Dept: INTERVENTIONAL RADIOLOGY/VASCULAR | Facility: HOSPITAL | Age: 34
End: 2019-02-06

## 2019-02-07 ENCOUNTER — HOSPITAL ENCOUNTER (OUTPATIENT)
Dept: INTERVENTIONAL RADIOLOGY/VASCULAR | Facility: HOSPITAL | Age: 34
Discharge: HOME | End: 2019-02-07
Attending: FAMILY MEDICINE | Admitting: PSYCHIATRY & NEUROLOGY
Payer: COMMERCIAL

## 2019-02-07 VITALS
OXYGEN SATURATION: 94 % | RESPIRATION RATE: 16 BRPM | TEMPERATURE: 98.3 F | SYSTOLIC BLOOD PRESSURE: 116 MMHG | DIASTOLIC BLOOD PRESSURE: 79 MMHG

## 2019-02-07 DIAGNOSIS — G35 MULTIPLE SCLEROSIS (H): ICD-10-CM

## 2019-02-07 LAB
APPEARANCE CSF: CLEAR
COLOR CSF: COLORLESS
GLUCOSE CSF-MCNC: 60 MG/DL (ref 40–70)
PROT CSF-MCNC: 36 MG/DL (ref 15–60)
RBC # CSF MANUAL: 2 /UL (ref 0–2)
TUBE # CSF: 1 #
WBC # CSF MANUAL: 0 /UL (ref 0–5)

## 2019-02-07 PROCEDURE — 82945 GLUCOSE OTHER FLUID: CPT | Performed by: RADIOLOGY

## 2019-02-07 PROCEDURE — 84157 ASSAY OF PROTEIN OTHER: CPT | Performed by: RADIOLOGY

## 2019-02-07 PROCEDURE — 82040 ASSAY OF SERUM ALBUMIN: CPT | Performed by: RADIOLOGY

## 2019-02-07 PROCEDURE — 82042 OTHER SOURCE ALBUMIN QUAN EA: CPT | Performed by: RADIOLOGY

## 2019-02-07 PROCEDURE — 82784 ASSAY IGA/IGD/IGG/IGM EACH: CPT | Performed by: RADIOLOGY

## 2019-02-07 PROCEDURE — 83916 OLIGOCLONAL BANDS: CPT | Performed by: RADIOLOGY

## 2019-02-07 PROCEDURE — 77003 FLUOROGUIDE FOR SPINE INJECT: CPT | Mod: TC

## 2019-02-07 PROCEDURE — 83873 ASSAY OF CSF PROTEIN: CPT | Performed by: RADIOLOGY

## 2019-02-07 RX ORDER — NICOTINE POLACRILEX 4 MG
15-30 LOZENGE BUCCAL
Status: DISCONTINUED | OUTPATIENT
Start: 2019-02-07 | End: 2019-02-08 | Stop reason: HOSPADM

## 2019-02-07 RX ORDER — DEXTROSE MONOHYDRATE 25 G/50ML
25-50 INJECTION, SOLUTION INTRAVENOUS
Status: DISCONTINUED | OUTPATIENT
Start: 2019-02-07 | End: 2019-02-08 | Stop reason: HOSPADM

## 2019-02-07 RX ORDER — LIDOCAINE HYDROCHLORIDE 10 MG/ML
10 INJECTION, SOLUTION EPIDURAL; INFILTRATION; INTRACAUDAL; PERINEURAL ONCE
Status: DISCONTINUED | OUTPATIENT
Start: 2019-02-07 | End: 2019-02-08 | Stop reason: HOSPADM

## 2019-02-07 NOTE — PROGRESS NOTES
Patient denies pain, discomfort, vss, afebrile.  puncture site continues CDI, one Band-Aid . Wife observed puncture site. Discontinue instructions given and understood. To home via wife to drive him.

## 2019-02-07 NOTE — IP AVS SNAPSHOT
"                  MRN:[id number]                      After Visit Summary   2/7/2019    Reese Sanchez    MRN: [id number]           Visit Information        Provider Department      2/7/2019  8:30 AM [facility]; [facility]; [facility]           Review of your medicines      You have not been prescribed any medications.           Protect others around you: Learn how to safely use, store and throw away your medicines at www.disposemymeds.org.       Follow-ups after your visit       Care Instructions       After Care Instructions     Activity: Bedrest OUTPATIENT      Bedrest with head of bed flat for 1 hour then discharge. Patient is allowed to have head up for meals and for bathroom privileges only.           Further instructions from your care team           DISCHARGE INSTRUCTIONS  Lumbar Puncture      IMPORTANT: As you prepare for discharge, the following information will help you return to your best level of health.       This Information Is About Your Follow Up Care    Call your doctor if you do not get better. Call sooner if you feel worse. You can reach your doctor by calling their clinic phone number.                                    This Information Is About Procedures      LUMBAR PUNCTURE (Spinal Tap).  A lumbar puncture (\"spinal tap\") was done to remove a small amount of your cerebrospinal fluid (CSF). This protective fluid surrounds your brain and spinal cord. A lumbar puncture measures the pressure in the cerebrospinal fluid. The sample of spinal fluid will be tested in the lab. We will let you know the results as soon as they are done.  The results of a lumbar puncture may help diagnose:    serious infections, such as meningitis    brain or spinal cord cancer    certain diseases such as multiple sclerosis or Guillain-[place] syndrome    bleeding in the brain, such as a subarachnoid bleed    At home, please follow these instructions:    Drink plenty of liquids. Drink liquids with caffeine. The " caffeine helps replace the CSF more quickly.    Lie down flat for the next 6 to 8 hours when you get home.    Some people get a headache that lasts a day or two after this procedure. If you do get a headache, try lying down flat.    Call your doctor if you have:    a severe headache that will not go away.    redness, swelling or drainage from the puncture site.    neck stiffness.    numbness or weakness below the area of the puncture site.    any new or bothersome symptoms.                        This Information Is About Your Illness and Diagnosis    LOW BACK PAIN  The muscles of the low back are put under a lot of strain in everyday life. Most of us do not keep our backs very strong. A number of medicines and other treatments help with low back symptoms.    Some treatments may include:    Medicine often helps low back symptoms. Medicine works well to control the pain and reduce inflammation and irritation of the muscle. The type of medicine chosen for you will match the type of symptoms you have.    Heat or cold applied to your back during the first 48 hours after the pain starts may help. Putting a cold pack to the painful area for 5 to 10 minutes at a time.  If your symptoms last longer than 48 hours, use a heating pad, hot shower or bath to help your back pain.    Follow these instructions:    Limit your bedrest to 2-3 days to avoid weakening your muscles.    When lying down, get up every few hours and walk around.    Wear comfortable, low-heeled shoes.    Make sure your work surface is at a comfortable height for you.    Use a chair with a good lower back support that may recline slightly.    Rest your feet on the floor or on a low stool, if you must sit for long periods of time.    Use a pillow or rolled up towel behind the small of your back if you must drive long distances.  Stop often and walk around a few minutes every hour or so.    Sleep on your back with a pillow under your knees, or sleep on your side  with your knees bent and a pillow between your knees to avoid trouble sleeping.    Slowly return to your normal activities including exercise. Slowly build up the speed and length of time you do the exercise.  Try the following:  o Walking short distances.  o Using a stationary bicycle.  o Swimming.    USE THE FOLLOWING BODY MECHANICS TO REDUCE YOUR RISK OF BACK INJURY:  Avoid the following:    Bending from your waist.    Lifting heavy objects higher than your waist.    Carrying unbalanced loads and sudden movements.    Activities that arch and strain your back (bending backwards or bending forward touching your toes).    Avoid lifting when twisting, bending forward, and reaching.    Avoid sitting for long periods of time.    Do the following:    Bend from your knees and face the object you lift.    Hold heavy objects close to your body.    Change your positions often.    Work with tools close to your body when mopping, vacuuming, raking, hoeing, etc.    Sit down to put your shoes and socks on.    Wear shoes with low heels and good support.    Reach for things close to your body.    Round your back and bend your knees slightly when you cough or sneeze.    Kneel when making a bed.    Stand tall with your chin in, back flat, pelvis tucked under and relax your knees.    Call your doctor if you have:    increased pain.    no improvement.    any new problems or concerns.                                  Additional Information About Your Visit       [employer] Information    [employer] gives you secure access to your electronic health record. If you see a primary care provider, you can also send messages to your care team and make appointments. If you have questions, please call your primary care clinic.  If you do not have a primary care provider, please call [phone] and they will assist you.       Care EveryWhere ID    This is your Care EveryWhere ID. This could be used by other organizations to access your [facility]  medical records  [id number]       Your Vitals Were     Blood Pressure   131/83 (BP Location: Right arm)    Temperature   98.8  F (37.1  C) (Oral)    Respirations   16    Pulse Oximetry   92%           Primary Care Provider Office Phone # Fax #    Nuha Hobbs [phone]      Equal Access to Services    [facility]: Hadii aad ku hadasho Soomaali, waaxda luqadaha, qaybta kaalmada adeegyada, waxay idiin hayaan adeeg kharash laMary Joaan . So [facility] [phone].    ATENCIÓN: Si habla español, tiene a quintero disposición servicios gratuitos de asistencia lingüística. [facility] [phone].    We comply with applicable federal civil rights laws and [place] laws. We do not discriminate on the basis of race, color, national origin, age, disability, sex, sexual orientation, or gender identity.           Thank you!    Thank you for choosing [facility] for your care. Our goal is always to provide you with excellent care. Hearing back from our patients is one way we can continue to improve our services. Please take a few minutes to complete the written survey that you may receive in the mail after you visit with us. Thank you!         Medication List    You have not been prescribed any medications.

## 2019-02-07 NOTE — IP AVS SNAPSHOT
[facility]  [address] [address]  Phone:  [phone]  Fax:  [phone]                                    After Visit Summary   2/7/2019    Reese Sanchez    MRN: [id number]           After Visit Summary Signature Page    I have received my discharge instructions, and my questions have been answered. I have discussed any challenges I see with this plan with the nurse or doctor.    ..........................................................................................................................................  Patient/Patient Representative Signature      ..........................................................................................................................................  Patient Representative Print Name and Relationship to Patient    ..................................................               ................................................  Date                                   Time    ..........................................................................................................................................  Reviewed by Signature/Title    ...................................................              ..............................................  Date                                               Time          22EPIC Rev 08/18

## 2019-02-07 NOTE — DISCHARGE INSTRUCTIONS
"    DISCHARGE INSTRUCTIONS  Lumbar Puncture      IMPORTANT: As you prepare for discharge, the following information will help you return to your best level of health.       This Information Is About Your Follow Up Care    Call your doctor if you do not get better. Call sooner if you feel worse. You can reach your doctor by calling their clinic phone number.                                    This Information Is About Procedures      LUMBAR PUNCTURE (Spinal Tap).  A lumbar puncture (\"spinal tap\") was done to remove a small amount of your cerebrospinal fluid (CSF). This protective fluid surrounds your brain and spinal cord. A lumbar puncture measures the pressure in the cerebrospinal fluid. The sample of spinal fluid will be tested in the lab. We will let you know the results as soon as they are done.  The results of a lumbar puncture may help diagnose:    serious infections, such as meningitis    brain or spinal cord cancer    certain diseases such as multiple sclerosis or Guillain-[place] syndrome    bleeding in the brain, such as a subarachnoid bleed    At home, please follow these instructions:    Drink plenty of liquids. Drink liquids with caffeine. The caffeine helps replace the CSF more quickly.    Lie down flat for the next 6 to 8 hours when you get home.    Some people get a headache that lasts a day or two after this procedure. If you do get a headache, try lying down flat.    Call your doctor if you have:    a severe headache that will not go away.    redness, swelling or drainage from the puncture site.    neck stiffness.    numbness or weakness below the area of the puncture site.    any new or bothersome symptoms.                        This Information Is About Your Illness and Diagnosis    LOW BACK PAIN  The muscles of the low back are put under a lot of strain in everyday life. Most of us do not keep our backs very strong. A number of medicines and other treatments help with low back symptoms.    Some " treatments may include:    Medicine often helps low back symptoms. Medicine works well to control the pain and reduce inflammation and irritation of the muscle. The type of medicine chosen for you will match the type of symptoms you have.    Heat or cold applied to your back during the first 48 hours after the pain starts may help. Putting a cold pack to the painful area for 5 to 10 minutes at a time.  If your symptoms last longer than 48 hours, use a heating pad, hot shower or bath to help your back pain.    Follow these instructions:    Limit your bedrest to 2-3 days to avoid weakening your muscles.    When lying down, get up every few hours and walk around.    Wear comfortable, low-heeled shoes.    Make sure your work surface is at a comfortable height for you.    Use a chair with a good lower back support that may recline slightly.    Rest your feet on the floor or on a low stool, if you must sit for long periods of time.    Use a pillow or rolled up towel behind the small of your back if you must drive long distances.  Stop often and walk around a few minutes every hour or so.    Sleep on your back with a pillow under your knees, or sleep on your side with your knees bent and a pillow between your knees to avoid trouble sleeping.    Slowly return to your normal activities including exercise. Slowly build up the speed and length of time you do the exercise.  Try the following:  o Walking short distances.  o Using a stationary bicycle.  o Swimming.    USE THE FOLLOWING BODY MECHANICS TO REDUCE YOUR RISK OF BACK INJURY:  Avoid the following:    Bending from your waist.    Lifting heavy objects higher than your waist.    Carrying unbalanced loads and sudden movements.    Activities that arch and strain your back (bending backwards or bending forward touching your toes).    Avoid lifting when twisting, bending forward, and reaching.    Avoid sitting for long periods of time.    Do the following:    Bend from your  knees and face the object you lift.    Hold heavy objects close to your body.    Change your positions often.    Work with tools close to your body when mopping, vacuuming, raking, hoeing, etc.    Sit down to put your shoes and socks on.    Wear shoes with low heels and good support.    Reach for things close to your body.    Round your back and bend your knees slightly when you cough or sneeze.    Kneel when making a bed.    Stand tall with your chin in, back flat, pelvis tucked under and relax your knees.    Call your doctor if you have:    increased pain.    no improvement.    any new problems or concerns.

## 2019-02-08 ENCOUNTER — TELEPHONE (OUTPATIENT)
Dept: INTERVENTIONAL RADIOLOGY/VASCULAR | Facility: HOSPITAL | Age: 34
End: 2019-02-08

## 2019-02-08 NOTE — TELEPHONE ENCOUNTER
----- Message from Carmen Juarez RN sent at 2/7/2019  9:32 AM CST -----  Regarding: next day post call  Contact: [phone]   Follow up lumbar puncture...

## 2019-02-09 LAB — MBP CSF-MCNC: 1.73 NG/ML (ref 0–5.5)

## 2019-02-10 LAB
ALB CSF/SERPL: 5.3 RATIO (ref 0–9)
ALBUMIN CSF-MCNC: 18 MG/DL (ref 0–35)
ALBUMIN SERPL-MCNC: 3410 MG/DL (ref 3500–5200)
IGG CSF-MCNC: 1.1 MG/DL (ref 0–6)
IGG SERPL-MCNC: 521 MG/DL (ref 768–1632)
IGG SYNTH RATE SER+CSF CALC-MRATE: <0 MG/D
IGG/ALB CLEAR SER+CSF-RTO: 0.4 RATIO (ref 0.28–0.66)
IGG/ALB CSF: 0.06 RATIO (ref 0.09–0.25)
OLIGOCLONAL BANDS CSF ELPH-IMP: ABNORMAL
OLIGOCLONAL BANDS CSF ELPH-IMP: NEGATIVE
OLIGOCLONAL BANDS CSF IEF: 1 BANDS (ref 0–1)

## 2019-02-11 ENCOUNTER — APPOINTMENT (OUTPATIENT)
Dept: INTERVENTIONAL RADIOLOGY/VASCULAR | Facility: HOSPITAL | Age: 34
End: 2019-02-11
Attending: PHYSICIAN ASSISTANT
Payer: COMMERCIAL

## 2019-02-11 ENCOUNTER — HOSPITAL ENCOUNTER (EMERGENCY)
Facility: HOSPITAL | Age: 34
Discharge: HOME | End: 2019-02-11
Attending: PHYSICIAN ASSISTANT | Admitting: PHYSICIAN ASSISTANT
Payer: COMMERCIAL

## 2019-02-11 ENCOUNTER — TELEPHONE (OUTPATIENT)
Dept: FAMILY MEDICINE | Facility: OTHER | Age: 34
End: 2019-02-11

## 2019-02-11 VITALS
OXYGEN SATURATION: 97 % | WEIGHT: 220 LBS | RESPIRATION RATE: 14 BRPM | BODY MASS INDEX: 29.16 KG/M2 | SYSTOLIC BLOOD PRESSURE: 131 MMHG | HEIGHT: 73 IN | DIASTOLIC BLOOD PRESSURE: 104 MMHG | TEMPERATURE: 98.3 F

## 2019-02-11 DIAGNOSIS — R51.9 HEADACHE: ICD-10-CM

## 2019-02-11 DIAGNOSIS — G97.1 POST-DURAL PUNCTURE HEADACHE: ICD-10-CM

## 2019-02-11 PROCEDURE — 99284 EMERGENCY DEPT VISIT MOD MDM: CPT | Mod: Z6 | Performed by: PHYSICIAN ASSISTANT

## 2019-02-11 PROCEDURE — 96374 THER/PROPH/DIAG INJ IV PUSH: CPT | Mod: XU

## 2019-02-11 PROCEDURE — 96361 HYDRATE IV INFUSION ADD-ON: CPT | Mod: XU

## 2019-02-11 PROCEDURE — 25000128 H RX IP 250 OP 636: Performed by: RADIOLOGY

## 2019-02-11 PROCEDURE — 25000128 H RX IP 250 OP 636: Performed by: PHYSICIAN ASSISTANT

## 2019-02-11 PROCEDURE — 25000125 ZZHC RX 250: Performed by: RADIOLOGY

## 2019-02-11 PROCEDURE — C1751 CATH, INF, PER/CENT/MIDLINE: HCPCS | Mod: TC

## 2019-02-11 PROCEDURE — 96375 TX/PRO/DX INJ NEW DRUG ADDON: CPT | Mod: XU

## 2019-02-11 PROCEDURE — 99284 EMERGENCY DEPT VISIT MOD MDM: CPT | Mod: 25

## 2019-02-11 RX ORDER — IOPAMIDOL 612 MG/ML
15 INJECTION, SOLUTION INTRATHECAL ONCE
Status: COMPLETED | OUTPATIENT
Start: 2019-02-11 | End: 2019-02-11

## 2019-02-11 RX ORDER — IOPAMIDOL 612 MG/ML
50 INJECTION, SOLUTION INTRAVASCULAR ONCE
Status: DISCONTINUED | OUTPATIENT
Start: 2019-02-11 | End: 2019-02-11 | Stop reason: CLARIF

## 2019-02-11 RX ORDER — KETOROLAC TROMETHAMINE 30 MG/ML
30 INJECTION, SOLUTION INTRAMUSCULAR; INTRAVENOUS ONCE
Status: COMPLETED | OUTPATIENT
Start: 2019-02-11 | End: 2019-02-11

## 2019-02-11 RX ORDER — DEXTROSE MONOHYDRATE 25 G/50ML
25-50 INJECTION, SOLUTION INTRAVENOUS
Status: CANCELLED | OUTPATIENT
Start: 2019-02-11

## 2019-02-11 RX ORDER — NICOTINE POLACRILEX 4 MG
15-30 LOZENGE BUCCAL
Status: CANCELLED | OUTPATIENT
Start: 2019-02-11

## 2019-02-11 RX ORDER — ACETAMINOPHEN 500 MG
500-1000 TABLET ORAL EVERY 6 HOURS PRN
COMMUNITY

## 2019-02-11 RX ORDER — SODIUM CHLORIDE 9 MG/ML
1000 INJECTION, SOLUTION INTRAVENOUS CONTINUOUS
Status: DISCONTINUED | OUTPATIENT
Start: 2019-02-11 | End: 2019-02-11 | Stop reason: HOSPADM

## 2019-02-11 RX ORDER — LIDOCAINE HYDROCHLORIDE 10 MG/ML
INJECTION, SOLUTION EPIDURAL; INFILTRATION; INTRACAUDAL; PERINEURAL
Status: DISCONTINUED
Start: 2019-02-11 | End: 2019-02-11 | Stop reason: HOSPADM

## 2019-02-11 RX ORDER — METOCLOPRAMIDE HYDROCHLORIDE 5 MG/ML
10 INJECTION INTRAMUSCULAR; INTRAVENOUS ONCE
Status: COMPLETED | OUTPATIENT
Start: 2019-02-11 | End: 2019-02-11

## 2019-02-11 RX ADMIN — LIDOCAINE HYDROCHLORIDE 4 ML: 10 INJECTION, SOLUTION EPIDURAL; INFILTRATION; INTRACAUDAL; PERINEURAL at 12:36

## 2019-02-11 RX ADMIN — IOPAMIDOL 4 ML: 612 INJECTION, SOLUTION INTRATHECAL at 12:37

## 2019-02-11 RX ADMIN — KETOROLAC TROMETHAMINE 30 MG: 30 INJECTION, SOLUTION INTRAMUSCULAR at 11:19

## 2019-02-11 RX ADMIN — METOCLOPRAMIDE 10 MG: 5 INJECTION, SOLUTION INTRAMUSCULAR; INTRAVENOUS at 11:19

## 2019-02-11 RX ADMIN — SODIUM CHLORIDE 1000 ML: 9 INJECTION, SOLUTION INTRAVENOUS at 11:19

## 2019-02-11 ASSESSMENT — ENCOUNTER SYMPTOMS
ACTIVITY CHANGE: 0
HEADACHES: 1
FATIGUE: 0
NAUSEA: 0
ABDOMINAL PAIN: 0
APPETITE CHANGE: 0
VOMITING: 0
CHEST TIGHTNESS: 0
NECK STIFFNESS: 0
BACK PAIN: 0
FACIAL ASYMMETRY: 0
CHILLS: 0
DIZZINESS: 0
NECK PAIN: 1
LIGHT-HEADEDNESS: 0
SPEECH DIFFICULTY: 0
WEAKNESS: 0
SHORTNESS OF BREATH: 0
FEVER: 0
BRUISES/BLEEDS EASILY: 0

## 2019-02-11 ASSESSMENT — MIFFLIN-ST. JEOR: SCORE: 2001.79

## 2019-02-11 NOTE — PROGRESS NOTES
Procedure: IMAGE GUIDED BLOOD PATCH PLACEMENT    There were  no complications and patient has no symptoms..      Tolerated procedure well.          Radiologist:Dr. Street    Time Out: Prior to the start of the procedure and with procedural staff participation, I verbally confirmed the patient s identity using two indicators, relevant allergies, that the procedure was appropriate and matched the consent or emergent situation, and that the correct equipment/implants were available. Immediately prior to starting the procedure I conducted the Time Out with the procedural staff and re-confirmed the patient s name, procedure, and site/side. (The Joint Commission universal protocol was followed.)  Yes    Position:  prone    Pain:  8/10 prior to the procedure, 3/10 post procedure    Sedation:None. Local Anesthestic used  No sedation    Estimated Blood Loss: None     Condition: Stable    Comments: See dictated procedure note for full details      Patient transferred back to ER via wheelchair.  Rates pain 3/10.  Back to his ER cart.  IV reconnected and remainder of bolus resumed.  Nurse to nurse report given to NAYE Iyer RN

## 2019-02-11 NOTE — ED AVS SNAPSHOT
[facility]  [address] [place] [address]  Phone:  [phone]                                    Reese Sanchez   MRN: [id number]    Department:  [facility]   Date of Visit:  2/11/2019           After Visit Summary Signature Page    I have received my discharge instructions, and my questions have been answered. I have discussed any challenges I see with this plan with the nurse or doctor.    ..........................................................................................................................................  Patient/Patient Representative Signature      ..........................................................................................................................................  Patient Representative Print Name and Relationship to Patient    ..................................................               ................................................  Date                                   Time    ..........................................................................................................................................  Reviewed by Signature/Title    ...................................................              ..............................................  Date                                               Time          22EPIC Rev 08/18

## 2019-02-11 NOTE — ED NOTES
Pt states his wife recently had the GI flu. Pt states he has been afebrile and not had the symptoms his wife had.

## 2019-02-11 NOTE — DISCHARGE INSTRUCTIONS
The radiologist would like you to lay flat on your back for the next 24 hours.     Stay hydrated.     Return here for any worsening symptoms, new symptoms, or other concerns.

## 2019-02-11 NOTE — ED PROVIDER NOTES
History     Chief Complaint   Patient presents with     Neck Pain     Headache     The history is provided by the patient.     Reese Sanchez is a 33 year old male who presented to the emergency department ambulatory for evaluation of headache.  The patient's past medical history is most significant for possible multiple sclerosis for which she underwent diagnostic lumbar puncture on February 7 here at [facility].  He reported developing a headache shortly after the LP and has been persisting for the last 4 days.  Headache is significantly worse when sitting and standing and significantly improved with lying flat.  Has no fevers or chills.  He has now developed some neck pain as well.  Denies any visual concerns.  Denies any falls.  Denies any history of drug abuse.  Denies any other questions or concerns.    Allergies:  No Known Allergies    Problem List:    Patient Active Problem List    Diagnosis Date Noted     MS (multiple sclerosis) (H) 01/29/2019     Priority: Medium        Past Medical History:    History reviewed. No pertinent past medical history.    Past Surgical History:    Past Surgical History:   Procedure Laterality Date     APPENDECTOMY       IR LUMBAR PUNCTURE  2/7/2019     wisdom teeth extraction         Family History:    Family History   Problem Relation Age of Onset     Arrhythmia Maternal Grandmother      Heart Disease Maternal Grandfather      Heart Disease Paternal Grandfather        Social History:  Marital Status:   [2]  Social History     Tobacco Use     Smoking status: Former Smoker     Smokeless tobacco: Never Used   Substance Use Topics     Alcohol use: No     Drug use: No        Medications:      acetaminophen (TYLENOL) 500 MG tablet         Review of Systems   Constitutional: Negative for activity change, appetite change, chills, fatigue and fever.   Eyes: Negative for visual disturbance.        Mild photophobia    Respiratory: Negative for chest tightness and shortness of breath.   "  Cardiovascular: Negative for chest pain.   Gastrointestinal: Negative for abdominal pain, nausea and vomiting.   Genitourinary: Negative.    Musculoskeletal: Positive for neck pain. Negative for back pain and neck stiffness.   Skin: Negative.    Neurological: Positive for headaches. Negative for dizziness, facial asymmetry, speech difficulty, weakness and light-headedness.        Generalized and bilateral.  Significantly worse with standing and sitting.  Significantly improves in the supine position.  He does report some mild bilateral arm radiculopathy as well.   Hematological: Does not bruise/bleed easily.       Physical Exam   BP: 153/98  Heart Rate: 78  Temp: 98.6  F (37  C)  Resp: 14  Height: 185.4 cm (6' 1\")  Weight: 99.8 kg (220 lb)  SpO2: 99 %      Physical Exam   Constitutional: He is oriented to person, place, and time. He appears well-developed and well-nourished. No distress.   Pleasant and talkative   HENT:   Head: Normocephalic and atraumatic.   Mouth/Throat: Oropharynx is clear and moist.   Eyes: Conjunctivae and EOM are normal. Pupils are equal, round, and reactive to light.   Neck: Normal range of motion. Neck supple.   Cardiovascular: Normal rate and regular rhythm.   Pulmonary/Chest: Effort normal and breath sounds normal.   Abdominal: Soft.   Lymphadenopathy:     He has no cervical adenopathy.   Neurological: He is alert and oriented to person, place, and time.   Neurological examination:  That the patient was awake and alert, the attention, orientation, concentration, language, memory and fund of knowledge were all normal.  The patient had no neglect or apraxia.    No focal concerns   Normal gait    Skin: Skin is warm and dry. Capillary refill takes less than 2 seconds. He is not diaphoretic.   Psychiatric: He has a normal mood and affect.   Nursing note and vitals reviewed.      ED Course        Procedures               Critical Care time:  none               Results for orders placed or " performed during the hospital encounter of 02/11/19 (from the past 24 hour(s))   XR Blood Patch Procedure    Narrative    XR BLOOD PATCH PROCEDURE    Clinical history: Male, age 33 years; Headache; Headache    Comparison: None    Technique: After informed consent was obtained, a timeout was  performed, satisfactorily identifying the patient and the site of the  procedure.    The patient was placed in the prone position and prepped and draped in  the usual sterile fashion. Local anesthetic consisting of 4 mL of 1%  lidocaine was instilled into the skin and deep tissues.    The tip of a 20-gauge Touhy needle was then directed toward the L3-4  interspace. Fluoroscopic guidance and loss-of-resistance was used to  position the tip in the epidural space.  4 milliliters of Isovue-M 300  contrast material were then instilled, demonstrating satisfactory  position of the needle tip.    20 mL of the autologous blood was instilled. Images were obtained. The  needle was then removed. The patient tolerated the procedure well.      Impression    Impression:  1.  Technically successful fluoroscopically guided lumbar blood patch.    2.  Total fluoroscopy time: .12 minutes.    3.  No acute complication. Followup is pending.    4.  The patient noted moderate relief immediately following the  procedure.    STEPHANIE AC MD       Medications   0.9% sodium chloride BOLUS (0 mLs Intravenous Stopped 2/11/19 1252)     Followed by   sodium chloride 0.9% infusion (not administered)   glucagon injection 1 mg (not administered)   lidocaine (PF) (XYLOCAINE) 1 % injection (not administered)   metoclopramide (REGLAN) injection 10 mg (10 mg Intravenous Given 2/11/19 1119)   ketorolac (TORADOL) injection 30 mg (30 mg Intravenous Given 2/11/19 1119)   lidocaine 1 % 5 mL (4 mLs Subcutaneous Given by Other Clinician 2/11/19 1236)   iopamidol (ISOVUE-M-300) solution 15 mL (4 mLs EPIDURAL Given by Other Clinician 2/11/19 1237)       Assessments & Plan  (with Medical Decision Making)   History of present illness, exam, and symptoms most consistent with a post dural puncture headache.  Blood patch done by interventional radiology.  No focal findings.  No fevers.  No evidence of meningitis.  History of present illness, exam, symptoms and exam are not consistent with acute subarachnoid hemorrhage, subdural hematoma, epidural hematoma, meningitis, cerebral venous thrombosis, or other intracranial catastrophe.  Feels well.  Headache significantly improved.  Observed over a protracted period.  I believe he can be safely discharged home with close follow-up.  Please see discharge instructions.    This document was prepared using a combination of typing and voice generated software.  While every attempt was made for accuracy, spelling and grammatical errors may exist.    I have reviewed the nursing notes.    I have reviewed the findings, diagnosis, plan and need for follow up with the patient.          Medication List      There are no discharge medications for this visit.         Final diagnoses:   Post-dural puncture headache       2/11/2019   [facility]     Tushar Rodriguez PA-C  02/11/19 3314

## 2019-02-11 NOTE — TELEPHONE ENCOUNTER
Patient called, stated is experiencing neck pain that shoots down into both arms.    Recently had a lumbar puncture on 2/7/19.     Wondering what should do for pain.     Theresa Wilkins LPN

## 2019-02-11 NOTE — ED NOTES
Patient presents for evaluation of severe headache and neck pain.  States he had a LP on Thursday to rule out/confirm DX of MS.  Patient states he has been having a headache since.  States his headache is worse when he is standing.  Pain in his neck radiates down his arms.  Patient ambulated to room and placed in a dark room lying flat.  Call light within reach.

## 2019-02-12 ENCOUNTER — TELEPHONE (OUTPATIENT)
Dept: INTERVENTIONAL RADIOLOGY/VASCULAR | Facility: HOSPITAL | Age: 34
End: 2019-02-12

## 2019-11-07 ENCOUNTER — HEALTH MAINTENANCE LETTER (OUTPATIENT)
Age: 34
End: 2019-11-07

## 2020-01-31 ENCOUNTER — HOSPITAL ENCOUNTER (OUTPATIENT)
Dept: MRI IMAGING | Facility: HOSPITAL | Age: 35
End: 2020-01-31
Attending: NURSE PRACTITIONER
Payer: COMMERCIAL

## 2020-01-31 ENCOUNTER — HOSPITAL ENCOUNTER (OUTPATIENT)
Dept: MRI IMAGING | Facility: HOSPITAL | Age: 35
Discharge: HOME | End: 2020-01-31
Attending: NURSE PRACTITIONER | Admitting: NURSE PRACTITIONER
Payer: COMMERCIAL

## 2020-01-31 DIAGNOSIS — G35 MS (MULTIPLE SCLEROSIS) (H): ICD-10-CM

## 2020-01-31 PROCEDURE — A9585 GADOBUTROL INJECTION: HCPCS | Performed by: RADIOLOGY

## 2020-01-31 PROCEDURE — 70553 MRI BRAIN STEM W/O & W/DYE: CPT | Mod: TC

## 2020-01-31 PROCEDURE — 72156 MRI NECK SPINE W/O & W/DYE: CPT | Mod: TC

## 2020-01-31 PROCEDURE — 25500064 ZZH RX 255 OP 636: Performed by: RADIOLOGY

## 2020-01-31 RX ORDER — GADOBUTROL 604.72 MG/ML
10 INJECTION INTRAVENOUS ONCE
Status: COMPLETED | OUTPATIENT
Start: 2020-01-31 | End: 2020-01-31

## 2020-01-31 RX ADMIN — GADOBUTROL 10 ML: 604.72 INJECTION INTRAVENOUS at 14:37

## 2020-02-01 ENCOUNTER — HOSPITAL ENCOUNTER (EMERGENCY)
Facility: HOSPITAL | Age: 35
Discharge: HOME | End: 2020-02-01
Attending: PHYSICIAN ASSISTANT | Admitting: PHYSICIAN ASSISTANT
Payer: COMMERCIAL

## 2020-02-01 ENCOUNTER — APPOINTMENT (OUTPATIENT)
Dept: GENERAL RADIOLOGY | Facility: HOSPITAL | Age: 35
End: 2020-02-01
Attending: PHYSICIAN ASSISTANT
Payer: COMMERCIAL

## 2020-02-01 VITALS
RESPIRATION RATE: 16 BRPM | TEMPERATURE: 99.4 F | HEART RATE: 83 BPM | OXYGEN SATURATION: 95 % | SYSTOLIC BLOOD PRESSURE: 136 MMHG | DIASTOLIC BLOOD PRESSURE: 97 MMHG

## 2020-02-01 DIAGNOSIS — J18.9 PNEUMONIA: ICD-10-CM

## 2020-02-01 DIAGNOSIS — Z87.891 PERSONAL HISTORY OF TOBACCO USE, PRESENTING HAZARDS TO HEALTH: ICD-10-CM

## 2020-02-01 DIAGNOSIS — J18.9 PNEUMONIA DUE TO INFECTIOUS ORGANISM, UNSPECIFIED LATERALITY, UNSPECIFIED PART OF LUNG: ICD-10-CM

## 2020-02-01 PROCEDURE — G0463 HOSPITAL OUTPT CLINIC VISIT: HCPCS | Mod: 25

## 2020-02-01 PROCEDURE — 99213 OFFICE O/P EST LOW 20 MIN: CPT | Mod: Z6 | Performed by: PHYSICIAN ASSISTANT

## 2020-02-01 PROCEDURE — 71046 X-RAY EXAM CHEST 2 VIEWS: CPT | Mod: TC

## 2020-02-01 RX ORDER — AZITHROMYCIN 250 MG/1
TABLET, FILM COATED ORAL
Qty: 6 TABLET | Refills: 0 | Status: SHIPPED | OUTPATIENT
Start: 2020-02-01 | End: 2020-02-12

## 2020-02-01 RX ORDER — AMOXICILLIN 500 MG/1
1000 CAPSULE ORAL 3 TIMES DAILY
Qty: 42 CAPSULE | Refills: 0 | Status: SHIPPED | OUTPATIENT
Start: 2020-02-01 | End: 2020-02-12

## 2020-02-01 RX ORDER — ALBUTEROL SULFATE 90 UG/1
2 AEROSOL, METERED RESPIRATORY (INHALATION) EVERY 4 HOURS PRN
Qty: 1 INHALER | Refills: 0 | Status: SHIPPED | OUTPATIENT
Start: 2020-02-01 | End: 2022-03-09

## 2020-02-01 ASSESSMENT — ENCOUNTER SYMPTOMS
FEVER: 0
FATIGUE: 1
SHORTNESS OF BREATH: 1
COUGH: 1

## 2020-02-01 NOTE — ED AVS SNAPSHOT
[facility]  [address] [address]  Phone:  [phone]                                    Reese Sanchez   MRN: [id number]    Department:  [facility]   Date of Visit:  2/1/2020           After Visit Summary Signature Page    I have received my discharge instructions, and my questions have been answered. I have discussed any challenges I see with this plan with the nurse or doctor.    ..........................................................................................................................................  Patient/Patient Representative Signature      ..........................................................................................................................................  Patient Representative Print Name and Relationship to Patient    ..................................................               ................................................  Date                                   Time    ..........................................................................................................................................  Reviewed by Signature/Title    ...................................................              ..............................................  Date                                               Time          22EPIC Rev 08/18

## 2020-02-02 NOTE — DISCHARGE INSTRUCTIONS
Schedule appointment with PCP in 7 to 10 days for follow-up chest x-ray  Take amoxicillin, azithromycin and albuterol inhaler as prescribed.

## 2020-02-02 NOTE — ED PROVIDER NOTES
History     Chief Complaint   Patient presents with     Cold Symptoms     x2 weeks     HPI  Reese Sanchez is a 34 year old male who presents to urgent care for evaluation of cough with shortness of breath and fatigue.  Patient states that he has had a cough for approximately 3 weeks and then yesterday noted extreme shortness of breath and fatigue.  Patient denies any history of asthma, smoking history, or any other associated symptoms at this time.  Patient does not take anything over-the-counter.    Allergies:  No Known Allergies    Problem List:    Patient Active Problem List    Diagnosis Date Noted     MS (multiple sclerosis) (H) 01/29/2019     Priority: Medium        Past Medical History:    No past medical history on file.    Past Surgical History:    Past Surgical History:   Procedure Laterality Date     APPENDECTOMY       IR LUMBAR PUNCTURE  2/7/2019     wisdom teeth extraction         Family History:    Family History   Problem Relation Age of Onset     Arrhythmia Maternal Grandmother      Heart Disease Maternal Grandfather      Heart Disease Paternal Grandfather        Social History:  Marital Status:   [2]  Social History     Tobacco Use     Smoking status: Former Smoker     Smokeless tobacco: Never Used   Substance Use Topics     Alcohol use: No     Drug use: No        Medications:    albuterol (PROAIR HFA/PROVENTIL HFA/VENTOLIN HFA) 108 (90 Base) MCG/ACT inhaler  amoxicillin (AMOXIL) 500 MG capsule  azithromycin (ZITHROMAX Z-DEBBIE) 250 MG tablet  acetaminophen (TYLENOL) 500 MG tablet          Review of Systems   Constitutional: Positive for fatigue. Negative for fever.   Respiratory: Positive for cough and shortness of breath.    All other systems reviewed and are negative.      Physical Exam   BP: 136/97  Pulse: 83  Temp: 99.4  F (37.4  C)  Resp: 16  SpO2: 95 %      Physical Exam  Vitals signs and nursing note reviewed.   Constitutional:       Appearance: Normal appearance. He is not ill-appearing  or toxic-appearing.   HENT:      Head: Normocephalic.      Right Ear: Tympanic membrane normal.      Left Ear: Tympanic membrane normal.      Nose: No congestion or rhinorrhea.      Mouth/Throat:      Pharynx: No oropharyngeal exudate or posterior oropharyngeal erythema.   Eyes:      Conjunctiva/sclera: Conjunctivae normal.      Pupils: Pupils are equal, round, and reactive to light.   Cardiovascular:      Rate and Rhythm: Regular rhythm.   Pulmonary:      Breath sounds: Examination of the right-lower field reveals rales. Rales present.   Neurological:      Mental Status: He is alert.         ED Course        Procedures              Critical Care time:               Results for orders placed or performed during the hospital encounter of 02/01/20 (from the past 24 hour(s))   XR Chest 2 Views    Narrative    PROCEDURE:  XR CHEST 2 VW    HISTORY:  cough/SOB.     COMPARISON:  2018    FINDINGS:   The cardiac silhouette is normal in size. The pulmonary vasculature is  normal.  There is some abnormal increased density both lung bases  consistent with atelectasis or pneumonia. No pleural effusion or  pneumothorax.      Impression    IMPRESSION:  Predominantly linear areas of increased density at both  lung bases most consistent with atelectasis however pneumonia cannot  be excluded      LOLITA FOX MD       Medications - No data to display    Assessments & Plan (with Medical Decision Making)   1.  Pneumonia    Discussed exam findings as well as x-ray read with patient.  Patient is prescribed azithromycin, amoxicillin and albuterol inhaler.  Border cares are discussed at length with patient.  I would like patient to follow-up with primary care provider in 7 to 10 days to have a follow-up chest x-ray.  Any further concerns in the meantime please return to urgent care or emergency department.  Patient verbalizes understanding and agreement of plan.        I have reviewed the nursing notes.    I have reviewed the findings,  diagnosis, plan and need for follow up with the patient.    Discharge Medication List as of 2/1/2020  6:51 PM      START taking these medications    Details   albuterol (PROAIR HFA/PROVENTIL HFA/VENTOLIN HFA) 108 (90 Base) MCG/ACT inhaler Inhale 2 puffs into the lungs every 4 hours as needed for shortness of breath / dyspnea or wheezing, Disp-1 Inhaler, R-0, E-PrescribePharmacy may dispense brand covered by insurance (Proair, or proventil or ventolin or generic albuterol inhaler)      amoxicillin (AMOXIL) 500 MG capsule Take 2 capsules (1,000 mg) by mouth 3 times daily for 7 days, Disp-42 capsule, R-0, E-Prescribe      azithromycin (ZITHROMAX Z-DEBBIE) 250 MG tablet Two tablets on the first day, then one tablet daily for the next 4 days, Disp-6 tablet, R-0, E-Prescribe             Final diagnoses:   Pneumonia       2/1/2020   [facility]     Nick Gudino PA-C  02/01/20 1481

## 2020-02-02 NOTE — ED TRIAGE NOTES
C/o difficulty breathing with a raspy sound and becomes winded easily since last night. Cough x 3 weeks  No known fevers.  No over the counter meds.

## 2020-02-03 ENCOUNTER — HOSPITAL ENCOUNTER (OUTPATIENT)
Dept: MRI IMAGING | Facility: HOSPITAL | Age: 35
Discharge: HOME | End: 2020-02-03
Attending: NURSE PRACTITIONER | Admitting: NURSE PRACTITIONER
Payer: COMMERCIAL

## 2020-02-03 DIAGNOSIS — G35 MS (MULTIPLE SCLEROSIS) (H): ICD-10-CM

## 2020-02-03 PROCEDURE — 25500064 ZZH RX 255 OP 636: Performed by: RADIOLOGY

## 2020-02-03 PROCEDURE — A9585 GADOBUTROL INJECTION: HCPCS | Performed by: RADIOLOGY

## 2020-02-03 PROCEDURE — 72157 MRI CHEST SPINE W/O & W/DYE: CPT | Mod: TC

## 2020-02-03 RX ORDER — GADOBUTROL 604.72 MG/ML
10 INJECTION INTRAVENOUS ONCE
Status: COMPLETED | OUTPATIENT
Start: 2020-02-03 | End: 2020-02-03

## 2020-02-03 RX ADMIN — GADOBUTROL 10 ML: 604.72 INJECTION INTRAVENOUS at 18:04

## 2020-02-11 ENCOUNTER — TELEPHONE (OUTPATIENT)
Dept: FAMILY MEDICINE | Facility: OTHER | Age: 35
End: 2020-02-11

## 2020-02-11 DIAGNOSIS — J18.9 PNEUMONIA DUE TO INFECTIOUS ORGANISM, UNSPECIFIED LATERALITY, UNSPECIFIED PART OF LUNG: Primary | ICD-10-CM

## 2020-02-11 NOTE — TELEPHONE ENCOUNTER
"Pt reports no change in symptoms since treated in the ED on 2/1/20 for pneumonia. Completed antibiotic tx as ordered. Productive Cough, tan color, scant amount, denies fever, reports some minor SOB with activity. Pt did not cough during triage. Denies SOB and/or difficulty breathing updated pt he must seek immediate medical attention if he does experience these symptoms. Pt reports \"the ED doc wanted me to have another chest x-ray as well\".     Chest x-ray pended for PCP and PCP's nurse to review.   "

## 2020-02-11 NOTE — TELEPHONE ENCOUNTER
10:19 AM    Reason for Call: OVERBOOK    Patient is having the following symptoms: Follow up from pneumonia diagnosis at urgent care./mychart request    The patient is requesting an appointment for 02/12-02/14 with Dr Barrientos.    Was an appointment offered for this call? No  If yes : Appointment type              Date    Preferred method for responding to this message: Telephone Call  What is your phone number ?[phone]    If we cannot reach you directly, may we leave a detailed response at the number you provided? Yes    Can this message wait until your PCP/provider returns, if unavailable today? Not applicable    Jamia Canales

## 2020-02-11 NOTE — PROGRESS NOTES
"Subjective     Reese Sanchez is a 34 year old male who presents to clinic today for the following health issues:    HPI   ED/UC Followup:    Facility:  [facility] [place]  Date of visit: 2/1/2020  Reason for visit: Pneumonia  Current Status: same- slight improvement- very easily exerted/fatigue-SOB noted; treated with Zpak, Amoxicillin, and Albuterol;  Xray:   IMPRESSION:  Predominantly linear areas of increased density at both  lung bases most consistent with atelectasis however pneumonia cannot  be excluded       Feels no significant clinical improvement.  Ongoing fatigue, cough, minimal sputum.  No known fevers.  99.7 when he checked.    No new symptoms.    Kid sick with influenza at home.  Becka - RSV as well.           Current Outpatient Medications   Medication     acetaminophen (TYLENOL) 500 MG tablet     albuterol (PROAIR HFA/PROVENTIL HFA/VENTOLIN HFA) 108 (90 Base) MCG/ACT inhaler     levofloxacin (LEVAQUIN) 750 MG tablet     No current facility-administered medications for this visit.        Patient Active Problem List   Diagnosis     MS (multiple sclerosis) (H)     Past Surgical History:   Procedure Laterality Date     APPENDECTOMY       IR LUMBAR PUNCTURE  2/7/2019     wisdom teeth extraction         Social History     Tobacco Use     Smoking status: Former Smoker     Smokeless tobacco: Never Used   Substance Use Topics     Alcohol use: No     Family History   Problem Relation Age of Onset     Arrhythmia Maternal Grandmother      Heart Disease Maternal Grandfather      Heart Disease Paternal Grandfather            Reviewed and updated as needed this visit by Provider  Allergies  Meds         Review of Systems   ROS COMP: Constitutional, HEENT, cardiovascular, pulmonary, gi and gu systems are negative, except as otherwise noted.      Objective    BP (!) 138/90   Pulse 69   Temp 97.6  F (36.4  C) (Tympanic)   Ht 1.88 m (6' 2\")   Wt 98.9 kg (218 lb)   SpO2 94%   BMI 27.99 kg/m    Body mass index is 27.99 " kg/m .  Physical Exam   GENERAL: healthy, alert and no distress  EYES: Eyes grossly normal to inspection, PERRL and conjunctivae and sclerae normal  HENT: ear canals and TM's normal, nose and mouth without ulcers or lesions  NECK: no adenopathy, no asymmetry, masses, or scars and thyroid normal to palpation  RESP: lungs clear to auscultation - no rales, rhonchi or wheezes  CV: regular rate and rhythm, normal S1 S2, no S3 or S4, no murmur, click or rub, no peripheral edema and peripheral pulses strong  ABDOMEN: soft, nontender, no hepatosplenomegaly, no masses and bowel sounds normal  MS: no gross musculoskeletal defects noted, no edema  PSYCH: mentation appears normal, affect normal/bright    Diagnostic Test Results:  Results for orders placed or performed in visit on 02/12/20 (from the past 24 hour(s))   Influenza A /B and RSV PCR performed in [place]   Result Value Ref Range    Specimen Description Nasopharyngeal     Influenza A PCR Negative NEG^Negative    Influenza B PCR Negative NEG^Negative    Resp Syncytial Virus Negative NEG^Negative   CBC with platelets and differential   Result Value Ref Range    WBC 11.8 (H) 4.0 - 11.0 10e9/L    RBC Count 5.69 4.4 - 5.9 10e12/L    Hemoglobin 17.6 13.3 - 17.7 g/dL    Hematocrit 50.6 40.0 - 53.0 %    MCV 89 78 - 100 fl    MCH 30.9 26.5 - 33.0 pg    MCHC 34.8 31.5 - 36.5 g/dL    RDW 12.0 10.0 - 15.0 %    Platelet Count 268 150 - 450 10e9/L    Diff Method Automated Method     % Neutrophils 73.1 %    % Lymphocytes 13.7 %    % Monocytes 8.9 %    % Eosinophils 2.6 %    % Basophils 1.4 %    % Immature Granulocytes 0.3 %    Nucleated RBCs 0 0 /100    Absolute Neutrophil 8.6 (H) 1.6 - 8.3 10e9/L    Absolute Lymphocytes 1.6 0.8 - 5.3 10e9/L    Absolute Monocytes 1.1 0.0 - 1.3 10e9/L    Absolute Eosinophils 0.3 0.0 - 0.7 10e9/L    Absolute Basophils 0.2 0.0 - 0.2 10e9/L    Abs Immature Granulocytes 0.0 0 - 0.4 10e9/L    Absolute Nucleated RBC 0.0            Assessment & Plan        "ICD-10-CM    1. Pneumonia due to infectious organism, unspecified laterality, unspecified part of lung J18.9 XR CHEST 2 VW (Clinic Performed)     XR Chest 2 Views     CBC with platelets and differential     Influenza A /B and RSV PCR performed in [place]     levofloxacin (LEVAQUIN) 750 MG tablet   2. Cough R05 CBC with platelets and differential     Influenza A /B and RSV PCR performed in [place]        BMI:   Estimated body mass index is 27.99 kg/m  as calculated from the following:    Height as of this encounter: 1.88 m (6' 2\").    Weight as of this encounter: 98.9 kg (218 lb).     Negative for flu/rsv.  Xray read as stable, but appears slightly worse to my view.  Cbc with slight elevation of wbc and left shift - no comparison as labs were not done in ER.  Will treat with course of Levaquin.  Follow up 1 month to assure pneumonia has cleared - sooner if not improving.      Patient Instructions   Will call with results.      No follow-ups on file.    Nuha Barrientos MD  [facility]        "

## 2020-02-12 ENCOUNTER — OFFICE VISIT (OUTPATIENT)
Dept: FAMILY MEDICINE | Facility: OTHER | Age: 35
End: 2020-02-12
Attending: FAMILY MEDICINE
Payer: COMMERCIAL

## 2020-02-12 ENCOUNTER — ANCILLARY PROCEDURE (OUTPATIENT)
Dept: GENERAL RADIOLOGY | Facility: OTHER | Age: 35
End: 2020-02-12
Attending: FAMILY MEDICINE
Payer: COMMERCIAL

## 2020-02-12 VITALS
OXYGEN SATURATION: 94 % | SYSTOLIC BLOOD PRESSURE: 138 MMHG | BODY MASS INDEX: 27.98 KG/M2 | HEART RATE: 69 BPM | DIASTOLIC BLOOD PRESSURE: 90 MMHG | HEIGHT: 74 IN | TEMPERATURE: 97.6 F | WEIGHT: 218 LBS

## 2020-02-12 DIAGNOSIS — R05.9 COUGH: ICD-10-CM

## 2020-02-12 DIAGNOSIS — J18.9 PNEUMONIA DUE TO INFECTIOUS ORGANISM, UNSPECIFIED LATERALITY, UNSPECIFIED PART OF LUNG: Primary | ICD-10-CM

## 2020-02-12 DIAGNOSIS — J18.9 PNEUMONIA DUE TO INFECTIOUS ORGANISM, UNSPECIFIED LATERALITY, UNSPECIFIED PART OF LUNG: ICD-10-CM

## 2020-02-12 LAB
BASOPHILS # BLD AUTO: 0.2 10E9/L (ref 0–0.2)
BASOPHILS NFR BLD AUTO: 1.4 %
DIFFERENTIAL METHOD BLD: ABNORMAL
EOSINOPHIL # BLD AUTO: 0.3 10E9/L (ref 0–0.7)
EOSINOPHIL NFR BLD AUTO: 2.6 %
ERYTHROCYTE [DISTWIDTH] IN BLOOD BY AUTOMATED COUNT: 12 % (ref 10–15)
FLUAV+FLUBV RNA SPEC QL NAA+PROBE: NEGATIVE
FLUAV+FLUBV RNA SPEC QL NAA+PROBE: NEGATIVE
HCT VFR BLD AUTO: 50.6 % (ref 40–53)
HGB BLD-MCNC: 17.6 G/DL (ref 13.3–17.7)
IMM GRANULOCYTES # BLD: 0 10E9/L (ref 0–0.4)
IMM GRANULOCYTES NFR BLD: 0.3 %
LYMPHOCYTES # BLD AUTO: 1.6 10E9/L (ref 0.8–5.3)
LYMPHOCYTES NFR BLD AUTO: 13.7 %
MCH RBC QN AUTO: 30.9 PG (ref 26.5–33)
MCHC RBC AUTO-ENTMCNC: 34.8 G/DL (ref 31.5–36.5)
MCV RBC AUTO: 89 FL (ref 78–100)
MONOCYTES # BLD AUTO: 1.1 10E9/L (ref 0–1.3)
MONOCYTES NFR BLD AUTO: 8.9 %
NEUTROPHILS # BLD AUTO: 8.6 10E9/L (ref 1.6–8.3)
NEUTROPHILS NFR BLD AUTO: 73.1 %
NRBC # BLD AUTO: 0 10*3/UL
NRBC BLD AUTO-RTO: 0 /100
PLATELET # BLD AUTO: 268 10E9/L (ref 150–450)
RBC # BLD AUTO: 5.69 10E12/L (ref 4.4–5.9)
RSV RNA SPEC NAA+PROBE: NEGATIVE
SPECIMEN SOURCE: NORMAL
WBC # BLD AUTO: 11.8 10E9/L (ref 4–11)

## 2020-02-12 PROCEDURE — 99214 OFFICE O/P EST MOD 30 MIN: CPT | Performed by: FAMILY MEDICINE

## 2020-02-12 PROCEDURE — 71046 X-RAY EXAM CHEST 2 VIEWS: CPT | Mod: TC

## 2020-02-12 PROCEDURE — 87631 RESP VIRUS 3-5 TARGETS: CPT | Performed by: FAMILY MEDICINE

## 2020-02-12 PROCEDURE — 85025 COMPLETE CBC W/AUTO DIFF WBC: CPT | Performed by: FAMILY MEDICINE

## 2020-02-12 PROCEDURE — 36415 COLL VENOUS BLD VENIPUNCTURE: CPT | Performed by: FAMILY MEDICINE

## 2020-02-12 RX ORDER — LEVOFLOXACIN 750 MG/1
750 TABLET, FILM COATED ORAL DAILY
Qty: 7 TABLET | Refills: 0 | Status: SHIPPED | OUTPATIENT
Start: 2020-02-12 | End: 2020-07-04

## 2020-02-12 ASSESSMENT — PAIN SCALES - GENERAL: PAINLEVEL: NO PAIN (0)

## 2020-02-12 ASSESSMENT — MIFFLIN-ST. JEOR: SCORE: 2003.59

## 2020-02-12 NOTE — NURSING NOTE
"Chief Complaint   Patient presents with     Cough     chest xray follow up       Initial BP (!) 132/92 (BP Location: Right arm, Patient Position: Sitting, Cuff Size: Adult Regular)   Pulse 69   Temp 97.6  F (36.4  C) (Tympanic)   Ht 1.88 m (6' 2\")   Wt 98.9 kg (218 lb)   SpO2 94%   BMI 27.99 kg/m   Estimated body mass index is 27.99 kg/m  as calculated from the following:    Height as of this encounter: 1.88 m (6' 2\").    Weight as of this encounter: 98.9 kg (218 lb).  Medication Reconciliation: complete  Brittani Guevara LPN  "

## 2020-02-12 NOTE — TELEPHONE ENCOUNTER
"Per ER note - \"follow up with primary care provider in 7-10 days to have a follow up chest x-ray\".   Schedule follow up visit please.  "

## 2020-05-30 ENCOUNTER — VIRTUAL VISIT (OUTPATIENT)
Dept: FAMILY MEDICINE | Facility: OTHER | Age: 35
End: 2020-05-30
Payer: COMMERCIAL

## 2020-05-30 PROCEDURE — 99421 OL DIG E/M SVC 5-10 MIN: CPT | Performed by: PHYSICIAN ASSISTANT

## 2020-05-30 NOTE — PROGRESS NOTES
"Date: 2020 14:07:11  Clinician: Ignacio Ryder  Clinician NPI: [id number]  Patient: Reese Sanchez  Patient : 1985  Patient Address: [address] [place] [address]  Patient Phone: [phone]  Visit Protocol: URI  Patient Summary:  Reese is a 34 year old ( : 1985 ) male who initiated a Visit for cold, sinus infection, or influenza. When asked the question \"Please sign me up to receive news, health information and promotions from [facility].\", Reese responded \"No\".    Reese states his symptoms started 1-2 days ago.   His symptoms consist of nausea, diarrhea, malaise, myalgia, facial pain or pressure, a headache, and chills. He is experiencing mild difficulty breathing with activities but can speak normally in full sentences. Reese also feels feverish.   Symptom details     Temperature: His current temperature is 100 degrees Fahrenheit.     Facial pain or pressure: The facial pain or pressure feels worse when bending over or leaning forward.     Headache: He states the headache is moderate (4-6 on a 10 point pain scale).      Reese denies having wheezing, teeth pain, ageusia, sore throat, anosmia, cough, nasal congestion, vomiting, rhinitis, and ear pain. He also denies having recent facial or sinus surgery in the past 60 days, taking antibiotic medication for the symptoms, and having a sinus infection within the past year.   Precipitating events  He has not recently been exposed to someone with influenza. Reese has been in close contact with the following high risk individuals: children under the age of 5.   Pertinent COVID-19 (Coronavirus) information  In the past 14 days, Reese has not worked in a congregate living setting.   He does not work or volunteer as healthcare worker or a  and does not work or volunteer in a healthcare facility.   Reese also has not lived in a congregate living setting in the past 14 days. He does not live with a healthcare worker.   Reese has not had a " close contact with a laboratory-confirmed COVID-19 patient within 14 days of symptom onset.   Pertinent medical history  Reese needs a return to work/school note.   Weight: 225 lbs   Reese does not smoke or use smokeless tobacco.   Additional information as reported by the patient (free text): On going low temp fever less than 100 that goes down with Tylenol. Had cold and hot shivers for first 24 hours then just low grade temp. Muscle fatigue and no appetite. Was diagnosed with ms last year but are not on any medication.   Weight: 225 lbs    MEDICATIONS: No current medications, ALLERGIES: NKDA  Clinician Response:  Dear Reese,      Your symptoms show that you may have coronavirus (COVID-19). This illness can cause fever, cough and trouble breathing. Many people get a mild case and get better on their own. Some people can get very sick.  Will I be tested for COVID-19?  Not all patients are tested for COVID-19. If you need to be tested, your care team will let you know. You may request testing if:   You are very ill. For example, you're on chemotherapy, dialysis or home hospice care. (Contact your specialty clinic or program.)   You live in a nursing home or other long-term care facility. (Talk to your nurse manager or medical director.)   You're a health care worker. (Contact your employee health office.)   We are performing limited curbside testing for healthcare/first responders and people with medical problems that put them at increased risk. It does not appear by the [facility] information you submitted that you meet any of these criteria. If there are medical problems that we did not know about, please repeat an [facility] visit and let us know what medical conditions you have.   How can I protect others?  Without a test, we can't know for sure that you have COVID-19. For safety, it's very important to follow these rules.  First, stay home and away from others (self-isolate) until:   You've had no fever---and no  "medicine that reduces fever---for 3 full days (72 hours). And...    Your other symptoms have gotten better. For example, your cough or breathing has improved. And...   At least 10 days have passed since your symptoms started.   During this time:   Stay in your own room (and use your own bathroom), if you can.   Stay away from others in your home. No hugging, kissing or shaking hands.   Don't let anyone visit.   Don't go to work, school or anywhere else.    Clean \"high touch\" surfaces often (doorknobs, counters, handles, etc.). Use a household cleaning spray or wipes.   Cover your mouth and nose with a mask, tissue or washcloth to avoid spreading germs.   Wash your hands and face often. Use soap and water.   How can I take care of myself?   1.Get lots of rest. Drink extra fluids (unless a doctor has told you not to).                  2.Take Tylenol (acetaminophen) for fever or pain. If you have liver or kidney problems, ask your family doctor if it's okay to take Tylenol.        Adults can take either:    650 mg (two 325 mg pills) every 4 to 6 hours, or...   1,000 mg (two 500 mg pills) every 8 hours as needed.    Note: Don't take more than 3,000 mg in one day. Acetaminophen is found in many medicines (both prescribed and over-the-counter medicines). Read all labels to be sure you don't take too much.    For children, check the Tylenol bottle for the right dose. The dose is based on the child's age or weight.   3.If you have other health problems (like cancer, heart failure, an organ transplant or severe kidney disease): Call your specialty clinic if you don't feel better in the next 2 days.       4.Know when to call 911: If your breathing is so bad that it keeps you from doing normal activities, call 911 or go to the emergency room. Tell them that you've been staying home and may have COVID-19.       5.Sign up for [facility]. We know it's scary to hear that you might have COVID-19. We want to track your symptoms to " make sure you're okay over the next 2 weeks. Please look for an email from [employer]---this is a free, online program that we'll use to keep in touch. To sign up, follow the link in the email. Learn more at [email or web address].   Where can I get more information?  For more about COVID-19 and caring for yourself at home, please visit the CDC website at https://www.cdc.gov/coronavirus/2019-ncov/about/steps-when-sick.html.  To learn about care at [facility], please visit [email or web address]         If you'd like to be part of a COVID-19 clinical trial (research study) at the [facility], go to [email or web address] for details.     Diagnosis: Myalgia  Diagnosis ICD: M79.1

## 2020-07-04 ENCOUNTER — HOSPITAL ENCOUNTER (EMERGENCY)
Facility: HOSPITAL | Age: 35
Discharge: HOME | End: 2020-07-04
Attending: PHYSICIAN ASSISTANT | Admitting: PHYSICIAN ASSISTANT
Payer: COMMERCIAL

## 2020-07-04 VITALS — OXYGEN SATURATION: 97 % | TEMPERATURE: 96.8 F | SYSTOLIC BLOOD PRESSURE: 146 MMHG | DIASTOLIC BLOOD PRESSURE: 98 MMHG

## 2020-07-04 DIAGNOSIS — T63.444A BEE STING REACTION, UNDETERMINED INTENT, INITIAL ENCOUNTER: ICD-10-CM

## 2020-07-04 DIAGNOSIS — L03.90 CELLULITIS, UNSPECIFIED CELLULITIS SITE: ICD-10-CM

## 2020-07-04 PROCEDURE — 99213 OFFICE O/P EST LOW 20 MIN: CPT | Mod: Z6 | Performed by: PHYSICIAN ASSISTANT

## 2020-07-04 PROCEDURE — G0463 HOSPITAL OUTPT CLINIC VISIT: HCPCS

## 2020-07-04 RX ORDER — CEPHALEXIN 500 MG/1
500 CAPSULE ORAL 4 TIMES DAILY
Qty: 28 CAPSULE | Refills: 0 | Status: SHIPPED | OUTPATIENT
Start: 2020-07-04 | End: 2020-07-11

## 2020-07-04 NOTE — ED AVS SNAPSHOT
[facility]  [address] [address]  Phone:  [phone]                                    Reese Sanchez   MRN: [id number]    Department:  [facility]   Date of Visit:  7/4/2020           After Visit Summary Signature Page    I have received my discharge instructions, and my questions have been answered. I have discussed any challenges I see with this plan with the nurse or doctor.    ..........................................................................................................................................  Patient/Patient Representative Signature      ..........................................................................................................................................  Patient Representative Print Name and Relationship to Patient    ..................................................               ................................................  Date                                   Time    ..........................................................................................................................................  Reviewed by Signature/Title    ...................................................              ..............................................  Date                                               Time          22EPIC Rev 08/18

## 2020-07-04 NOTE — ED TRIAGE NOTES
"Pt is here with c/o bee sting onset yesterday \"now its cellulitis\" \"bert had it twice before for the same reason  Located on right lower leg towards the ankle  Pt notes the stink is warm and red  "

## 2020-07-04 NOTE — ED PROVIDER NOTES
History     Chief Complaint   Patient presents with     Rash     reddness and swelling posterior right ankle after bee sting yesterday     HPI  Reese Sanchez is a 35 year old male who presents here after being stung by a bee yesterday he has some redness and swelling to the posterior right ankle.  He says he coming in to get cellulitis so he presents here early now.  He is here for antibiotics.    Allergies:  No Known Allergies    Problem List:    Patient Active Problem List    Diagnosis Date Noted     MS (multiple sclerosis) (H) 01/29/2019     Priority: Medium        Past Medical History:    No past medical history on file.    Past Surgical History:    Past Surgical History:   Procedure Laterality Date     APPENDECTOMY       IR LUMBAR PUNCTURE  2/7/2019     wisdom teeth extraction         Family History:    Family History   Problem Relation Age of Onset     Arrhythmia Maternal Grandmother      Heart Disease Maternal Grandfather      Heart Disease Paternal Grandfather        Social History:  Marital Status:   [2]  Social History     Tobacco Use     Smoking status: Former Smoker     Smokeless tobacco: Never Used   Substance Use Topics     Alcohol use: No     Drug use: No        Medications:    acetaminophen (TYLENOL) 500 MG tablet  albuterol (PROAIR HFA/PROVENTIL HFA/VENTOLIN HFA) 108 (90 Base) MCG/ACT inhaler  cephALEXin (KEFLEX) 500 MG capsule          Review of Systems  Per HPI no other concerns no fever no chills.  No chest pain or shortness of breath.  Physical Exam   BP: 146/98  Heart Rate: 59  Temp: 96.8  F (36  C)  SpO2: 97 %      Physical Exam  35-year-old gentleman no acute distress at this time nontoxic appearance visualizable nurse.  Head and face normocephalic atraumatic.  Lungs clear to auscultation.  Cardiovascular rate 69.  Examination of his right ankle reveals some erythema from bug bite he is worried about being cellulitic.  ED Course   Likely localized bug bite reaction however he wants  antibiotics because he says he can become cellulitic from bug bites.         Assessments & Plan (with Medical Decision Making)     I have reviewed the nursing notes.    I have reviewed the findings, diagnosis, plan and need for follow up with the patient.      New Prescriptions    CEPHALEXIN (KEFLEX) 500 MG CAPSULE    Take 1 capsule (500 mg) by mouth 4 times daily for 7 days       Final diagnoses:   Bee sting reaction, undetermined intent, initial encounter   Cellulitis, unspecified cellulitis site       7/4/2020   [facility]

## 2020-08-12 DIAGNOSIS — Z79.899 ENCOUNTER FOR LONG-TERM (CURRENT) USE OF OTHER MEDICATIONS: Primary | ICD-10-CM

## 2020-08-12 DIAGNOSIS — G35 MULTIPLE SCLEROSIS (H): ICD-10-CM

## 2020-08-12 LAB
ALT SERPL W P-5'-P-CCNC: 47 U/L (ref 0–70)
AST SERPL W P-5'-P-CCNC: 19 U/L (ref 0–45)
BASOPHILS # BLD AUTO: 0.1 10E9/L (ref 0–0.2)
BASOPHILS NFR BLD AUTO: 1.7 %
DIFFERENTIAL METHOD BLD: NORMAL
EOSINOPHIL # BLD AUTO: 0.4 10E9/L (ref 0–0.7)
EOSINOPHIL NFR BLD AUTO: 4.8 %
ERYTHROCYTE [DISTWIDTH] IN BLOOD BY AUTOMATED COUNT: 12.5 % (ref 10–15)
HCT VFR BLD AUTO: 50.3 % (ref 40–53)
HGB BLD-MCNC: 17.7 G/DL (ref 13.3–17.7)
IMM GRANULOCYTES # BLD: 0 10E9/L (ref 0–0.4)
IMM GRANULOCYTES NFR BLD: 0.5 %
LYMPHOCYTES # BLD AUTO: 1.5 10E9/L (ref 0.8–5.3)
LYMPHOCYTES NFR BLD AUTO: 17.9 %
MCH RBC QN AUTO: 31.5 PG (ref 26.5–33)
MCHC RBC AUTO-ENTMCNC: 35.2 G/DL (ref 31.5–36.5)
MCV RBC AUTO: 90 FL (ref 78–100)
MONOCYTES # BLD AUTO: 0.7 10E9/L (ref 0–1.3)
MONOCYTES NFR BLD AUTO: 7.7 %
NEUTROPHILS # BLD AUTO: 5.7 10E9/L (ref 1.6–8.3)
NEUTROPHILS NFR BLD AUTO: 67.4 %
NRBC # BLD AUTO: 0 10*3/UL
NRBC BLD AUTO-RTO: 0 /100
PLATELET # BLD AUTO: 204 10E9/L (ref 150–450)
RBC # BLD AUTO: 5.62 10E12/L (ref 4.4–5.9)
WBC # BLD AUTO: 8.4 10E9/L (ref 4–11)

## 2020-08-12 PROCEDURE — 85025 COMPLETE CBC W/AUTO DIFF WBC: CPT | Performed by: PSYCHIATRY & NEUROLOGY

## 2020-08-12 PROCEDURE — 82784 ASSAY IGA/IGD/IGG/IGM EACH: CPT | Performed by: PSYCHIATRY & NEUROLOGY

## 2020-08-12 PROCEDURE — 84460 ALANINE AMINO (ALT) (SGPT): CPT | Performed by: PSYCHIATRY & NEUROLOGY

## 2020-08-12 PROCEDURE — 86803 HEPATITIS C AB TEST: CPT | Performed by: PSYCHIATRY & NEUROLOGY

## 2020-08-12 PROCEDURE — 87340 HEPATITIS B SURFACE AG IA: CPT | Performed by: PSYCHIATRY & NEUROLOGY

## 2020-08-12 PROCEDURE — 84450 TRANSFERASE (AST) (SGOT): CPT | Performed by: PSYCHIATRY & NEUROLOGY

## 2020-08-12 PROCEDURE — 86360 T CELL ABSOLUTE COUNT/RATIO: CPT | Performed by: PSYCHIATRY & NEUROLOGY

## 2020-08-12 PROCEDURE — 86355 B CELLS TOTAL COUNT: CPT | Performed by: PSYCHIATRY & NEUROLOGY

## 2020-08-12 PROCEDURE — 86481 TB AG RESPONSE T-CELL SUSP: CPT | Performed by: PSYCHIATRY & NEUROLOGY

## 2020-08-12 PROCEDURE — 87389 HIV-1 AG W/HIV-1&-2 AB AG IA: CPT | Performed by: PSYCHIATRY & NEUROLOGY

## 2020-08-12 PROCEDURE — 86704 HEP B CORE ANTIBODY TOTAL: CPT | Performed by: PSYCHIATRY & NEUROLOGY

## 2020-08-12 PROCEDURE — 86359 T CELLS TOTAL COUNT: CPT | Performed by: PSYCHIATRY & NEUROLOGY

## 2020-08-12 PROCEDURE — 36415 COLL VENOUS BLD VENIPUNCTURE: CPT | Performed by: PSYCHIATRY & NEUROLOGY

## 2020-08-12 PROCEDURE — 86357 NK CELLS TOTAL COUNT: CPT | Performed by: PSYCHIATRY & NEUROLOGY

## 2020-08-13 LAB
CD19 CELLS # BLD: 148 CELLS/UL (ref 107–698)
CD19 CELLS NFR BLD: 9 % (ref 6–27)
CD3 CELLS # BLD: 1047 CELLS/UL (ref 603–2990)
CD3 CELLS NFR BLD: 64 % (ref 49–84)
CD3+CD4+ CELLS # BLD: 768 CELLS/UL (ref 441–2156)
CD3+CD4+ CELLS NFR BLD: 47 % (ref 28–63)
CD3+CD4+ CELLS/CD3+CD8+ CLL BLD: 2.94 % (ref 1.4–2.6)
CD3+CD8+ CELLS # BLD: 270 CELLS/UL (ref 125–1312)
CD3+CD8+ CELLS NFR BLD: 16 % (ref 10–40)
CD3-CD16+CD56+ CELLS # BLD: 439 CELLS/UL (ref 95–640)
CD3-CD16+CD56+ CELLS NFR BLD: 27 % (ref 4–25)
HBV CORE AB SERPL QL IA: NONREACTIVE
HBV SURFACE AG SERPL QL IA: NONREACTIVE
HCV AB SERPL QL IA: NONREACTIVE
HIV 1+2 AB+HIV1 P24 AG SERPL QL IA: NONREACTIVE
IFC SPECIMEN: ABNORMAL
IGA SERPL-MCNC: 72 MG/DL (ref 84–499)
IGG SERPL-MCNC: 510 MG/DL (ref 610–1616)
IGM SERPL-MCNC: 51 MG/DL (ref 35–242)

## 2020-08-14 LAB
GAMMA INTERFERON BACKGROUND BLD IA-ACNC: 0.83 IU/ML
M TB IFN-G CD4+ BCKGRND COR BLD-ACNC: 9.17 IU/ML
M TB TUBERC IFN-G BLD QL: NEGATIVE
MITOGEN IGNF BCKGRD COR BLD-ACNC: 0.09 IU/ML
MITOGEN IGNF BCKGRD COR BLD-ACNC: 0.1 IU/ML

## 2020-11-19 NOTE — PROGRESS NOTES
Signed faxed orders from Dr Galindo with [facility] to place peripheral IV for home infusion use, and ok to keep IV accessed when dc'd from infusion center. Per Rosie RN, she took call from 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 They want him on the schedule on Friday 11-20-20 per Rosie. Plan placed and sent to PCP Anjel to sign. Will await sign prior to placing patient on schedule.

## 2020-11-19 NOTE — PROGRESS NOTES
Plan signed by PCP. Note to [facility] to call pt and get on schedule tomorrow, Friday 11-20-20. Original orders sent to scanning.

## 2020-11-20 ENCOUNTER — INFUSION THERAPY VISIT (OUTPATIENT)
Dept: INFUSION THERAPY | Facility: OTHER | Age: 35
End: 2020-11-20
Attending: FAMILY MEDICINE
Payer: COMMERCIAL

## 2020-11-20 VITALS
BODY MASS INDEX: 29.57 KG/M2 | WEIGHT: 230.38 LBS | OXYGEN SATURATION: 97 % | TEMPERATURE: 97.3 F | SYSTOLIC BLOOD PRESSURE: 136 MMHG | HEIGHT: 74 IN | DIASTOLIC BLOOD PRESSURE: 94 MMHG

## 2020-11-20 DIAGNOSIS — G35 MS (MULTIPLE SCLEROSIS) (H): Primary | ICD-10-CM

## 2020-11-20 PROCEDURE — 99207 PR NO CHARGE NURSE ONLY: CPT

## 2020-11-20 ASSESSMENT — MIFFLIN-ST. JEOR: SCORE: 2055

## 2020-11-20 NOTE — PROGRESS NOTES
24 gauge angio cath inserted into RT arm.  Immediate blood return noted.  IV secured with sterile, transparent dressing and tape.  Patient tolerated well, denies pain or discomfort at this time.  Flushes easily without resistance, no signs or symptoms of infiltration or infection.   Flushed with 3mL normal saline to clear line. Patient denies questions or concerns .  Patient  Discharged with IV intact.

## 2020-11-24 ENCOUNTER — TELEPHONE (OUTPATIENT)
Dept: FAMILY MEDICINE | Facility: OTHER | Age: 35
End: 2020-11-24
Payer: COMMERCIAL

## 2020-11-24 NOTE — TELEPHONE ENCOUNTER
11:54 AM    Reason for Call: OVERBOOK    Patient is having the following symptoms: HIGH BP for 1 days.    The patient is requesting an appointment for ASAP with Dr. Hobbs.    Was an appointment offered for this call? No  If yes : Appointment type              Date    Preferred method for responding to this message: Telephone Call  What is your phone number ?[phone]    If we cannot reach you directly, may we leave a detailed response at the number you provided? Yes    Can this message wait until your PCP/provider returns, if unavailable today? Rita Fay

## 2020-11-25 ENCOUNTER — OFFICE VISIT (OUTPATIENT)
Dept: FAMILY MEDICINE | Facility: OTHER | Age: 35
End: 2020-11-25
Attending: FAMILY MEDICINE
Payer: COMMERCIAL

## 2020-11-25 VITALS
DIASTOLIC BLOOD PRESSURE: 82 MMHG | HEIGHT: 72 IN | WEIGHT: 226 LBS | BODY MASS INDEX: 30.61 KG/M2 | HEART RATE: 84 BPM | OXYGEN SATURATION: 99 % | RESPIRATION RATE: 19 BRPM | TEMPERATURE: 99 F | SYSTOLIC BLOOD PRESSURE: 130 MMHG

## 2020-11-25 DIAGNOSIS — I10 ESSENTIAL HYPERTENSION: Primary | ICD-10-CM

## 2020-11-25 LAB
ALBUMIN UR-MCNC: NEGATIVE MG/DL
ANION GAP SERPL CALCULATED.3IONS-SCNC: 3 MMOL/L (ref 3–14)
APPEARANCE UR: CLEAR
BASOPHILS # BLD AUTO: 0.1 10E9/L (ref 0–0.2)
BASOPHILS NFR BLD AUTO: 1 %
BILIRUB UR QL STRIP: NEGATIVE
BUN SERPL-MCNC: 13 MG/DL (ref 7–30)
CALCIUM SERPL-MCNC: 9.3 MG/DL (ref 8.5–10.1)
CHLORIDE SERPL-SCNC: 105 MMOL/L (ref 94–109)
CHOLEST SERPL-MCNC: 254 MG/DL
CO2 SERPL-SCNC: 30 MMOL/L (ref 20–32)
COLOR UR AUTO: NORMAL
CREAT SERPL-MCNC: 1 MG/DL (ref 0.66–1.25)
DIFFERENTIAL METHOD BLD: NORMAL
EOSINOPHIL # BLD AUTO: 0.3 10E9/L (ref 0–0.7)
EOSINOPHIL NFR BLD AUTO: 3.3 %
ERYTHROCYTE [DISTWIDTH] IN BLOOD BY AUTOMATED COUNT: 11.9 % (ref 10–15)
GFR SERPL CREATININE-BSD FRML MDRD: >90 ML/MIN/{1.73_M2}
GLUCOSE SERPL-MCNC: 98 MG/DL (ref 70–99)
GLUCOSE UR STRIP-MCNC: NEGATIVE MG/DL
HCT VFR BLD AUTO: 48.8 % (ref 40–53)
HDLC SERPL-MCNC: 56 MG/DL
HGB BLD-MCNC: 17.4 G/DL (ref 13.3–17.7)
HGB UR QL STRIP: NEGATIVE
IMM GRANULOCYTES # BLD: 0 10E9/L (ref 0–0.4)
IMM GRANULOCYTES NFR BLD: 0.5 %
KETONES UR STRIP-MCNC: NEGATIVE MG/DL
LDLC SERPL CALC-MCNC: 149 MG/DL
LEUKOCYTE ESTERASE UR QL STRIP: NEGATIVE
LYMPHOCYTES # BLD AUTO: 2.4 10E9/L (ref 0.8–5.3)
LYMPHOCYTES NFR BLD AUTO: 29.8 %
MCH RBC QN AUTO: 31.6 PG (ref 26.5–33)
MCHC RBC AUTO-ENTMCNC: 35.7 G/DL (ref 31.5–36.5)
MCV RBC AUTO: 89 FL (ref 78–100)
MONOCYTES # BLD AUTO: 0.7 10E9/L (ref 0–1.3)
MONOCYTES NFR BLD AUTO: 8.5 %
NEUTROPHILS # BLD AUTO: 4.5 10E9/L (ref 1.6–8.3)
NEUTROPHILS NFR BLD AUTO: 56.9 %
NITRATE UR QL: NEGATIVE
NONHDLC SERPL-MCNC: 198 MG/DL
NRBC # BLD AUTO: 0 10*3/UL
NRBC BLD AUTO-RTO: 0 /100
PH UR STRIP: 7 PH (ref 4.7–8)
PLATELET # BLD AUTO: 208 10E9/L (ref 150–450)
POTASSIUM SERPL-SCNC: 3.9 MMOL/L (ref 3.4–5.3)
RBC # BLD AUTO: 5.5 10E12/L (ref 4.4–5.9)
SODIUM SERPL-SCNC: 138 MMOL/L (ref 133–144)
SOURCE: NORMAL
SP GR UR STRIP: 1 (ref 1–1.03)
TRIGL SERPL-MCNC: 245 MG/DL
TSH SERPL DL<=0.005 MIU/L-ACNC: 1.64 MU/L (ref 0.4–4)
UROBILINOGEN UR STRIP-MCNC: NORMAL MG/DL (ref 0–2)
WBC # BLD AUTO: 8 10E9/L (ref 4–11)

## 2020-11-25 PROCEDURE — 36415 COLL VENOUS BLD VENIPUNCTURE: CPT | Performed by: FAMILY MEDICINE

## 2020-11-25 PROCEDURE — 84443 ASSAY THYROID STIM HORMONE: CPT | Performed by: FAMILY MEDICINE

## 2020-11-25 PROCEDURE — 80048 BASIC METABOLIC PNL TOTAL CA: CPT | Performed by: FAMILY MEDICINE

## 2020-11-25 PROCEDURE — 81003 URINALYSIS AUTO W/O SCOPE: CPT | Performed by: FAMILY MEDICINE

## 2020-11-25 PROCEDURE — 99214 OFFICE O/P EST MOD 30 MIN: CPT | Performed by: FAMILY MEDICINE

## 2020-11-25 PROCEDURE — 85025 COMPLETE CBC W/AUTO DIFF WBC: CPT | Performed by: FAMILY MEDICINE

## 2020-11-25 PROCEDURE — 80061 LIPID PANEL: CPT | Performed by: FAMILY MEDICINE

## 2020-11-25 RX ORDER — LISINOPRIL 10 MG/1
10 TABLET ORAL DAILY
Qty: 30 TABLET | Refills: 1 | Status: SHIPPED | OUTPATIENT
Start: 2020-11-25 | End: 2021-01-20

## 2020-11-25 ASSESSMENT — MIFFLIN-ST. JEOR: SCORE: 2003.13

## 2020-11-25 ASSESSMENT — PAIN SCALES - GENERAL: PAINLEVEL: NO PAIN (0)

## 2020-11-25 NOTE — NURSING NOTE
Chief Complaint   Patient presents with     Hypertension       Initial BP (!) 146/86   Pulse 84   Temp 99  F (37.2  C) (Tympanic)   Resp 19   Ht 1.829 m (6')   Wt 102.5 kg (226 lb)   SpO2 99%   BMI 30.65 kg/m   Estimated body mass index is 30.65 kg/m  as calculated from the following:    Height as of this encounter: 1.829 m (6').    Weight as of this encounter: 102.5 kg (226 lb).  Medication Reconciliation: complete  Floresita Brooks LPN

## 2020-11-25 NOTE — PATIENT INSTRUCTIONS
Will call with lab results.  Start Lisinopril 10 mg daily.  Work on healthy diet, exercise.  Follow up 1 month -sooner if needed.      Patient Education     Controlling High Blood Pressure   High blood pressure (hypertension) is often called the silent killer. This is because many people who have it, don t know it. It can be very dangerous. High blood pressure can raise your risk of heart attack, stroke, heart disease, and heart failure. Controlling your blood pressure can decrease your risk of these problems. It's important to know the appropriate blood pressure range and remember to check your blood pressure regularly. Doing so can save your life.   Blood pressure measurements are given as 2 numbers. Systolic blood pressure is the upper number. This is the pressure when the heart contracts. Diastolic blood pressure is the lower number. This is the pressure when the heart relaxes between beats.   Blood pressure is categorized as normal, elevated, or stage 1 or stage 2 high blood pressure:     Normal blood pressure is systolic of less than 120 and diastolic of less than 80 (120/80)    Elevated blood pressure is systolic of 120 to 129 and diastolic less than 80    Stage 1 high blood pressure is systolic is 130 to 139 or diastolic between 80 to 89    Stage 2 high blood pressure is when systolic is 140 or higher or the diastolic is 90 or higher  A heart-healthy lifestyle can help you control your blood pressure without medicines. Here are some things you can do to pursue a heart-healthy lifestyle:     Choose heart-healthy foods     Select low-salt, low-fat foods. Limit sodium intake to 2,400 mg per day or the amount suggested by your healthcare provider.    Limit canned, dried, cured, packaged, and fast foods. These can contain a lot of salt.    Eat 8 to 10 servings of fruits and vegetables every day.    Choose lean meats, fish, or chicken.    Eat whole-grain pasta, brown rice, and beans.    Eat 2 to 3 servings of  low-fat or fat-free dairy products.    Ask your doctor about the DASH eating plan. This plan helps reduce blood pressure.    When you go to a restaurant, ask that your meal be prepared with no added salt.    Stay at a healthy weight     Ask your healthcare provider how many calories to eat a day. Then stick to that number.    Ask your healthcare provider what weight range is healthiest for you. If you are overweight, a weight loss of only 3% to 5% of your body weight can help lower blood pressure. Generally, a good weight loss goal is to lose 10% of your body weight in a year.    Limit snacks and sweets.    Get regular exercise.    Get up and get active     Find activities you enjoy that can be done alone or with friends or family. Such activities might include bicycling, dancing, walking, or jogging.    Park farther away from building entrances to walk more.    Use stairs instead of the elevator.    When you can, walk or bike instead of driving.    [place] leaves, garden, or do household repairs.    Be active at a moderate to vigorous level of physical activity for at least 40 minutes for a minimum of 3 to 4 days a week.     Manage stress     Make time to relax and enjoy life. Find time to laugh.    Communicate your concerns with your loved ones and your healthcare provider.    Visit with family and friends, and keep up with hobbies.    Limit alcohol and quit smoking     Men should have no more than 2 drinks per day.    Women should have no more than 1 drink per day.    Talk with your healthcare provider about quitting smoking. Smoking significantly increases your risk for heart disease and stroke. Ask your healthcare provider about community smoking cessation programs and other options.    Medicines  If lifestyle changes aren t enough, your healthcare provider may prescribe high blood pressure medicine. Take all medicines as prescribed. If you have any questions about your medicines, ask your healthcare provider  before stopping or changing them.   [employer] last reviewed this educational content on 6/1/2019 2000-2020 [employer]. [address] [place] [address], [place] [address]. All rights reserved. This information is not intended as a substitute for professional medical care. Always follow your healthcare professional's instructions.           Patient Education     High Blood Pressure, New, Begin Treatment    Your blood pressure was high enough today to start treatment with medicines. Often healthcare providers don t know what causes high blood pressure (hypertension). But it can be controlled with lifestyle changes and medicines. High blood pressure usually has no symptoms. But it can sometimes cause headache, dizziness, blurred vision, a rushing sound in your ears, chest pain, or shortness of breath. But even without symptoms, high blood pressure that s not treated raises your risk for heart attack, heart failure, kidney disease, vascular disease, and stroke. High blood pressure is a serious health risk and shouldn t be ignored.    Blood pressure measurements are given as 2 numbers.    Systolic blood pressure is the upper number. This is the pressure when the heart contracts.    Diastolic blood pressure is the lower number. This is the pressure when the heart relaxes between beats.  You will see your blood pressure readings written together. For example, a person with a systolic pressure of 118 and a diastolic pressure of 78 will have 118/78 written in the medical record.   Blood pressure is categorized as normal, elevated, or stage 1 or stage 2 high blood pressure:    Normal blood pressure is systolic of less than 120 and diastolic of less than 80 (120/80)    Elevated blood pressure is systolic of 120 to 129 and diastolic less than 80    Stage 1 high blood pressure is systolic is 130 to 139 or diastolic between 80 to 89    Stage 2 high blood pressure is when systolic is 140 or higher or the diastolic is 90 or  higher  Home care  If you have high blood pressure, do what's listed below to lower your blood pressure. If you are taking medicines for high blood pressure, these methods may reduce or end your need for medicines in the future.    Begin a weight-loss program if you are overweight.    Cut back on how much salt you get in your diet. Here s how to do this:  ? Don t eat foods that have a lot of salt. These include olives, pickles, smoked meats, and salted potato chips.  ? Don t add salt to your food at the table.  ? Use only small amounts of salt when cooking.  ? Review food labels to track how much salt is in prepared foods.  ? When eating out, ask that no additional salt be added to your food order.  ? Ask your provider about the DASH diet or the DASH (dietary approaches to stop hypertension) eating plan.    Start an exercise program. Talk with your healthcare provider about the type of exercise program that would be best for you. It doesn't have to be hard. Even brisk walking for 20 minutes 3 times a week is a good form of exercise.    Don t take medicines that have heart stimulants. This includes many over-the-counter cold and sinus decongestant pills and sprays, as well as diet pills. Check the warnings about high blood pressure on the label. Before purchasing any over-the-counter medicines or supplements, always ask the pharmacist about the product's potential interaction with your high blood pressure and your high blood pressure medicines.    Stimulants such as amphetamine or cocaine could be lethal for someone with high blood pressure. Never take these.    Limit how much caffeine you get in your diet. Switch to caffeine-free products.    Stop smoking. If you are a long-time smoker, this can be hard. Enroll in a stop-smoking program to make it more likely that you will quit for good. Or, talk with your healthcare provider about nicotine replacements or medicines that can help.    Learn how to handle stress. This  is an important part of any program to lower blood pressure. Learn about relaxation methods like meditation, yoga, or biofeedback.    If your provider prescribed medicines, take them exactly as directed. Missing doses may cause your blood pressure to get out of control.    If you miss a dose or doses, check with your healthcare provider or pharmacist about what to do.    Limit alcohol. Drinking too much alcohol can raise blood pressure. Men should have no more than 2 drinks a day. Women should have no more than 1. A drink is equal to 1 beer, or a small glass of wine, or a shot of liquor..    Consider buying an automatic blood pressure machine so you can check your blood pressure regularly at home. Your provider can make a recommendation. You can get one of these at most pharmacies.  The American Heart Association recommends the following guidelines for home blood pressure monitoring:    Don't smoke or drink coffee or other caffeinated drinks or exercise for 30 minutes before taking your blood pressure.    Go to the bathroom before the test.    Relax for 5 minutes before taking the measurement.    Sit with your back supported (don't sit on a couch or soft chair); keep your feet on the floor uncrossed. Place your arm on a solid flat surface (like a table) with the upper part of the arm at heart level. Place the middle of the cuff directly above the bend of the elbow. Check the monitor's instruction manual for an illustration.    Take multiple readings. When you measure, take 2 to 3 readings one minute apart and record all of the results.    Take your blood pressure at the same time every day, or as your healthcare provider recommends.    Record the date, time, and blood pressure reading.    Take the record with you to your next medical appointment. If your blood pressure monitor has a built-in memory, simply take the monitor with you to your next appointment.    Call your provider if you have several high readings.  Don't be frightened by a single high blood pressure reading, but if you get several high readings, check in with your healthcare provider.    Note: If blood pressure reaches a systolic (top number) of 180 or higher OR diastolic (bottom number) of 120 or higher, seek emergency medical treatment.  Follow-up care  Because a new blood pressure medicine was started today, it s important that you have your blood pressure rechecked. This is to make sure that the medicine is working and that you have no serious side effects. Keep all your follow up appointments. Write down medicine and blood pressure questions and bring them to your next appointment. If you have pressing concerns about your new medicine or your blood pressure, call your provider. Unless told otherwise, follow up with your healthcare provider within the next 3 days.  When to seek medical care  Call your healthcare provider right away if any of these occur:    Blood pressure reaches a systolic (top number) of 180 or higher, OR diastolic (bottom number) of 120 or higher, seek emergency medical treatment.    Chest pain or shortness of breath    Severe headache    Throbbing or rushing sound in the ears    Nosebleed    Sudden severe pain in your belly (abdomen)    Extreme drowsiness, confusion, or fainting    Dizziness or dizziness with a spinning sensation (vertigo)    Weakness of an arm or leg or one side of the face    You have problems speaking or seeing   [employer] last reviewed this educational content on 12/1/2019 2000-2020 [employer]. [address] [place] [address], [place] [address]. All rights reserved. This information is not intended as a substitute for professional medical care. Always follow your healthcare professional's instructions.

## 2020-11-25 NOTE — PROGRESS NOTES
Subjective     Reese Sanchez is a 35 year old male who presents to clinic today for the following health issues:    HPI         Hypertension Follow-up  BP was elevated at infusion 136/94 so they recommended he be seen      Do you check your blood pressure regularly outside of the clinic? No     Are you following a low salt diet? No    Are your blood pressures ever more than 140 on the top number (systolic) OR more   than 90 on the bottom number (diastolic), for example 140/90? Yes     Home readings - on occasion - 130s/70-90s on average.  Higher at times.  Hasn't checked it at home recently.    Difficult to separate from MS symptoms - pain, dyspnea - chronic/intermittent.    Has been on steroid infusions for his MS for past several months.  Okrevis planning to start - 12/8 next start.    Family history of hypertension- paternal side, including dad.  Maternal side history of heart disease.    No tobacco.    AM coffee.  Job changed - more sedentary/desk job.  No exercise.    Fasting other than coffee and small amount creamer.        Review of Systems   Constitutional, HEENT, cardiovascular, pulmonary, gi and gu systems are negative, except as otherwise noted.      Objective    /82   Pulse 84   Temp 99  F (37.2  C) (Tympanic)   Resp 19   Ht 1.829 m (6')   Wt 102.5 kg (226 lb)   SpO2 99%   BMI 30.65 kg/m    Body mass index is 30.65 kg/m .  Physical Exam   GENERAL: healthy, alert and no distress  EYES: Eyes grossly normal to inspection, PERRL and conjunctivae and sclerae normal  NECK: no adenopathy, no asymmetry, masses, or scars and thyroid normal to palpation  RESP: lungs clear to auscultation - no rales, rhonchi or wheezes  CV: regular rate and rhythm, normal S1 S2, no S3 or S4, no murmur, click or rub, no peripheral edema and peripheral pulses strong  ABDOMEN: soft, nontender, no hepatosplenomegaly, no masses and bowel sounds normal  MS: no gross musculoskeletal defects noted, no edema  PSYCH: mentation  appears normal, affect normal/bright            Assessment & Plan     Essential hypertension  New diagnosis - confirmed on more than 1 occasion over past few months.  Steroid infusions may be contributing factor.  If not needed in the future - may not need treatment for hypertension?  Check baseline labs, risk factors.  Prior EKG in scanned media - normal.  Discussed lifestyle modifications, diet, exercise.  Start low dose Lisinopril.  Instructed on potential side effects - cough, angioedema.  Close follow up - 1 month, sooner if needed.  - TSH with free T4 reflex  - Basic metabolic panel  - Lipid Profile (Chol, Trig, HDL, LDL calc)  - UA reflex to Microscopic and Culture  - CBC with platelets and differential  - lisinopril (ZESTRIL) 10 MG tablet; Take 1 tablet (10 mg) by mouth daily     BMI:   Estimated body mass index is 30.65 kg/m  as calculated from the following:    Height as of this encounter: 1.829 m (6').    Weight as of this encounter: 102.5 kg (226 lb).   Weight management plan: Discussed healthy diet and exercise guidelines      See Patient Instructions    Return in about 1 month (around 12/25/2020) for BP Recheck.    Nuha Barrientos MD  [facility] - [facility]

## 2020-11-29 ENCOUNTER — HEALTH MAINTENANCE LETTER (OUTPATIENT)
Age: 35
End: 2020-11-29

## 2020-12-20 ENCOUNTER — VIRTUAL VISIT (OUTPATIENT)
Dept: FAMILY MEDICINE | Facility: OTHER | Age: 35
End: 2020-12-20
Payer: COMMERCIAL

## 2020-12-20 PROCEDURE — 99421 OL DIG E/M SVC 5-10 MIN: CPT | Performed by: NURSE PRACTITIONER

## 2020-12-20 NOTE — PROGRESS NOTES
"Date: 2020 12:20:06  Clinician: Mela Lange  Clinician NPI: [id number]  Patient: Reese Sanchez  Patient : 1985  Patient Address: [address] [place] [address]  Patient Phone: [phone]  Visit Protocol: URI  Patient Summary:  Reese is a 35 year old ( : 1985 ) male who initiated a [facility] Visit for COVID-19 (Coronavirus) evaluation and screening. When asked the question \"Please sign me up to receive news, health information and promotions from [facility].\", Reese responded \"No\".    Reese states his symptoms started 1-2 days ago.   His symptoms consist of facial pain or pressure, myalgia, a headache, nausea, chills, and malaise. Reese also feels feverish.   Symptom details     Temperature: His current temperature is 99.2 degrees Fahrenheit.     Facial pain or pressure: The facial pain or pressure feels worse when bending over or leaning forward.     Headache: He states the headache is mild (1-3 on a 10 point pain scale).      Reese denies having diarrhea, anosmia, ear pain, wheezing, cough, nasal congestion, sore throat, teeth pain, ageusia, vomiting, and rhinitis. He also denies having a sinus infection within the past year, having recent facial or sinus surgery in the past 60 days, and taking antibiotic medication in the past month. He is not experiencing dyspnea.   Precipitating events  He has not recently been exposed to someone with influenza. Reese has not been in close contact with any high risk individuals.   Pertinent COVID-19 (Coronavirus) information  Reese does not work or volunteer as healthcare worker or a . In the past 14 days, Reese has not worked or volunteered at a healthcare facility or group living setting.   In the past 14 days, he also has not lived in a congregate living setting.   Reese has not had a close contact with a laboratory-confirmed COVID-19 patient within 14 days of symptom onset.    Reese has been tested for COVID-19.      Date(s) of his " COVID-19 test as reported by the patient (free text): 06/01/2020       Result of COVID-19 test as reported by the patient (free text): Negative       Type of test as reported by the patient (free text): Nasal        Pertinent medical history   He reports having immunosuppressive condition(s). Other immunosuppressive condition as reported by the patient (free text):  MS  He has not been told by his provider to avoid NSAIDs.   Reese does not have diabetes. He denies having congestive heart failure and severe COPD. He does not have asthma.   Reese needs a return to work/school note.   Reese does not smoke or use smokeless tobacco.   Weight: 225 lbs    MEDICATIONS: Ocrevus intravenous, lisinopril-hydrochlorothiazide oral, ALLERGIES: NKDA  Clinician Response:  Dear Reese,     Your symptoms show that you may have coronavirus (COVID-19). This illness can cause fever, cough and trouble breathing. Many people get a mild case and get better on their own. Some people can get very sick.  What should I do?  We would like to test you for this virus.   1. Please call [phone] to schedule your visit. Explain that you were referred by [facility] to have a COVID-19 test. Be ready to share your [facility] visit ID number.  * If you need to schedule in [facility] please call [phone] or for [employer] employees please call [phone].  * If you need to schedule in the [place] area please call [phone]. [employer] employees call [phone].  The following will serve as your written order for this COVID Test, ordered by me, for the indication of suspected COVID [Z20.828]: The test will be ordered in [employer], our electronic health record, after you are scheduled. It will show as ordered and authorized by Jose Francisco Garcia MD.  Order: COVID-19 (Coronavirus) PCR for SYMPTOMATIC testing from [facility].   2. When it's time for your COVID test:  Stay at least 6 feet away from others. (If someone will drive you to your test, stay in the backseat, as  "far away from the  as you can.)   Cover your mouth and nose with a mask, tissue or washcloth.  Go straight to the testing site. Don't make any stops on the way there or back.      3.Starting now: Stay home and away from others (self-isolate) until:   You've had no fever---and no medicine that reduces fever---for one full day (24 hours). And...   Your other symptoms have gotten better. For example, your cough or breathing has improved. And...   At least 10 days have passed since your symptoms started.       During this time, don't leave the house except for testing or medical care.   Stay in your own room, even for meals. Use your own bathroom if you can.   Stay away from others in your home. No hugging, kissing or shaking hands. No visitors.  Don't go to work, school or anywhere else.    Clean \"high touch\" surfaces often (doorknobs, counters, handles, etc.). Use a household cleaning spray or wipes. You'll find a full list of  on the EPA website: www.epa.gov/pesticide-registration/list-n-disinfectants-use-against-sars-cov-2.   Cover your mouth and nose with a mask, tissue or washcloth to avoid spreading germs.  Wash your hands and face often. Use soap and water.  Caregivers in these groups are at risk for severe illness due to COVID-19:  o People 65 years and older  o People who live in a nursing home or long-term care facility  o People with chronic disease (lung, heart, cancer, diabetes, kidney, liver, immunologic)  o People who have a weakened immune system, including those who:   Are in cancer treatment  Take medicine that weakens the immune system, such as corticosteroids  Had a bone marrow or organ transplant  Have an immune deficiency  Have poorly controlled HIV or AIDS  Are obese (body mass index of 40 or higher)  Smoke regularly   o Caregivers should wear gloves while washing dishes, handling laundry and cleaning bedrooms and bathrooms.  o Use caution when washing and drying laundry: Don't " shake dirty laundry, and use the warmest water setting that you can.  o For more tips, go to www.cdc.gov/coronavirus/2019-ncov/downloads/10Things.pdf.    4.Sign up for [employer]. We know it's scary to hear that you might have COVID-19. We want to track your symptoms to make sure you're okay over the next 2 weeks. Please look for an email from [employer]---this is a free, online program that we'll use to keep in touch. To sign up, follow the link in the email. Learn more at [email or web address]  How can I take care of myself?   Get lots of rest. Drink extra fluids (unless a doctor has told you not to).   Take Tylenol (acetaminophen) for fever or pain. If you have liver or kidney problems, ask your family doctor if it's okay to take Tylenol.   Adults can take either:    650 mg (two 325 mg pills) every 4 to 6 hours, or...   1,000 mg (two 500 mg pills) every 8 hours as needed.    Note: Don't take more than 3,000 mg in one day. Acetaminophen is found in many medicines (both prescribed and over-the-counter medicines). Read all labels to be sure you don't take too much.   For children, check the Tylenol bottle for the right dose. The dose is based on the child's age or weight.    If you have other health problems (like cancer, heart failure, an organ transplant or severe kidney disease): Call your specialty clinic if you don't feel better in the next 2 days.       Know when to call 911. Emergency warning signs include:    Trouble breathing or shortness of breath Pain or pressure in the chest that doesn't go away Feeling confused like you haven't felt before, or not being able to wake up Bluish-colored lips or face.  Where can I get more information?    [employer] -- About COVID-19: [email or web address]   CDC -- What to Do If You're Sick: www.cdc.gov/coronavirus/2019-ncov/about/steps-when-sick.html   CDC -- Ending Home Isolation:  www.cdc.gov/coronavirus/2019-ncov/hcp/disposition-in-home-patients.html   [facility] -- Caring for Someone: www.cdc.gov/coronavirus/2019-ncov/if-you-are-sick/care-for-someone.html   [facility] -- Interim Guidance for Hospital Discharge to Home: www.[facility].[place]/diseases/coronavirus/hcp/hospdischarge.pdf   [facility] clinical trials (COVID-19 research studies): clinicalaffairs.[facility]-clinical-trials    Below are the COVID-19 hotlines at the [place] [facility] ([facility]). Interpreters are available.    For health questions: Call 498-572-4970 or 1-729.484.1550 (7 a.m. to 7 p.m.) For questions about schools and childcare: Call 068-491-8553 or 1-758.545.5239 (7 a.m. to 7 p.m.)    Diagnosis: Myalgia, unspecified site  Diagnosis ICD: M79.10

## 2020-12-21 ENCOUNTER — TELEPHONE (OUTPATIENT)
Dept: FAMILY MEDICINE | Facility: OTHER | Age: 35
End: 2020-12-21

## 2020-12-21 ENCOUNTER — OFFICE VISIT (OUTPATIENT)
Dept: FAMILY MEDICINE | Facility: OTHER | Age: 35
End: 2020-12-21
Attending: FAMILY MEDICINE
Payer: COMMERCIAL

## 2020-12-21 DIAGNOSIS — Z20.822 SUSPECTED 2019 NOVEL CORONAVIRUS INFECTION: ICD-10-CM

## 2020-12-21 DIAGNOSIS — Z20.822 SUSPECTED 2019 NOVEL CORONAVIRUS INFECTION: Primary | ICD-10-CM

## 2020-12-21 PROCEDURE — U0003 INFECTIOUS AGENT DETECTION BY NUCLEIC ACID (DNA OR RNA); SEVERE ACUTE RESPIRATORY SYNDROME CORONAVIRUS 2 (SARS-COV-2) (CORONAVIRUS DISEASE [COVID-19]), AMPLIFIED PROBE TECHNIQUE, MAKING USE OF HIGH THROUGHPUT TECHNOLOGIES AS DESCRIBED BY CMS-2020-01-R: HCPCS | Performed by: FAMILY MEDICINE

## 2020-12-21 NOTE — PROGRESS NOTES
facial pain or pressure, myalgia, a headache, nausea, chills, and malaise. Reese also feels feverish.. COVID testing complete

## 2020-12-22 LAB
SARS-COV-2 RNA SPEC QL NAA+PROBE: NOT DETECTED
SPECIMEN SOURCE: NORMAL

## 2021-01-04 ENCOUNTER — TELEPHONE (OUTPATIENT)
Dept: FAMILY MEDICINE | Facility: OTHER | Age: 36
End: 2021-01-04

## 2021-01-15 NOTE — PROGRESS NOTES
Assessment & Plan     Essential hypertension  BP improved with Lisinopril. However, even though medication ordered as 30 with 1 refill, pharmacy declined his refill?  Instructed patient to call if there is a problem in the future.  Readings were improving, but not quite at goal.  Now, back up.    Restart lisinopril 10 mg daily.  Update us with readings in 2 weeks. Then if above 130/80 will increase to 20 mg daily.  Recheck office visit and BMP 2 months.  - lisinopril (ZESTRIL) 10 MG tablet; Take 1 tablet (10 mg) by mouth daily    Hyperlipidemia, unspecified hyperlipidemia type  Diet, exercise, weight management.    Recheck annually or sooner if significant changes in above.    Need for vaccination  Declines.    Fever and chills  Intermittent - quite rare - and isolated to 24 hour episodes.   Related to MS, immune system?  Young kids at home bringing home viruses, etc?  Will try to evaluate at time of next flare if there is one.   See patient instructions - CBC, CMP, sed rate, crp, and possibly additional studies if needed.    Clinic M-F.  UC/ER weekend.         Patient Instructions   Restart 10 mg Lisinopril daily.  Update us with BP readings in 2 weeks.    If still above 130/80 on average, will plan to increase to 20 mg and then recheck in a month or so.    Plan to recheck lipids, fasting, in 1 year, sooner if significant lifestyle changes - diet, exercise, weight.    Flu and tetanus vaccines declined.    Next episode of fever - please call and be seen in clinic if M-F, UC if weekend.  Would advise labs - CMP, CBC, sed rate, CRP.  Consider chest xray if any respiratory symptoms.  COVID swabbing as appropriate as well.      Return in about 2 months (around 3/20/2021) for hypertension.    Nuha Barrientos MD  [facility] - JOSE Leigh is a 35 year old who presents to clinic today for the following health issues     HPI     Due for Tetanus and Flu vaccines -Patient  Declined    Hypertension Follow-up    Do you check your blood pressure regularly outside of the clinic? Yes     Are you following a low salt diet? No    Are your blood pressures ever more than 140 on the top number (systolic) OR more   than 90 on the bottom number (diastolic), for example 140/90? Yes     Family history HTN.  Sedentary.  Lisinopril 10 mg started 11/25/2020.  Took it for a month, then couldn't get the authorized refill?  Weight down 8 pounds.  Has eaten less, but also stressful.  Father in law passes.    Home readings 122-135/80-95 when on it.  Since off - 140s/100.\    No side effects.    Hyperlipidemia Follow-Up - new diagnosis with recent labs    Are you regularly taking any medication or supplement to lower your cholesterol?   No    Are you having muscle aches or other side effects that you think could be caused by your cholesterol lowering medication?  N/A      How many servings of fruits and vegetables do you eat daily?  2-3    On average, how many sweetened beverages do you drink each day (Examples: soda, juice, sweet tea, etc.  Do NOT count diet or artificially sweetened beverages)?   0    How many days per week do you exercise enough to make your heart beat faster? 3 or less    How many minutes a day do you exercise enough to make your heart beat faster? 10 - 19  How many days per week do you miss taking your medication? Pt needed visit today to get refills    What makes it hard for you to take your medications?  needing refill    Concern - Fever  Onset: random fevers - 3 times in the past year - June, October, December  Description: lasts 24 hours  Accompanying Signs & Symptoms: chills, aches, fatigue, poor appetite, nausea  Therapies tried and outcome: Tylenol  No identified trigger.  MS doctor does not feel it is MS related. However, is concerning if having fevers on Ocrevus.  2 prior negative COVID tests.    Review of Systems   Constitutional, HEENT, cardiovascular, pulmonary, gi and gu  systems are negative, except as otherwise noted.      Objective    /88 (BP Location: Right arm, Patient Position: Sitting, Cuff Size: Adult Regular)   Pulse 82   Temp 97.7  F (36.5  C) (Tympanic)   Ht 1.829 m (6')   Wt 98.9 kg (218 lb)   SpO2 97%   BMI 29.57 kg/m    Body mass index is 29.57 kg/m .  Physical Exam   GENERAL: healthy, alert and no distress  EYES: Eyes grossly normal to inspection, PERRL and conjunctivae and sclerae normal  NECK: no adenopathy, no asymmetry, masses, or scars and thyroid normal to palpation  RESP: lungs clear to auscultation - no rales, rhonchi or wheezes  CV: regular rate and rhythm, normal S1 S2, no S3 or S4, no murmur, click or rub, no peripheral edema and peripheral pulses strong  MS: no gross musculoskeletal defects noted, no edema  NEURO: Normal strength and tone, mentation intact and speech normal  PSYCH: mentation appears normal, affect normal/bright

## 2021-01-20 ENCOUNTER — OFFICE VISIT (OUTPATIENT)
Dept: FAMILY MEDICINE | Facility: OTHER | Age: 36
End: 2021-01-20
Attending: FAMILY MEDICINE
Payer: COMMERCIAL

## 2021-01-20 VITALS
BODY MASS INDEX: 29.53 KG/M2 | OXYGEN SATURATION: 97 % | SYSTOLIC BLOOD PRESSURE: 138 MMHG | WEIGHT: 218 LBS | TEMPERATURE: 97.7 F | HEIGHT: 72 IN | HEART RATE: 82 BPM | DIASTOLIC BLOOD PRESSURE: 88 MMHG

## 2021-01-20 DIAGNOSIS — E78.5 HYPERLIPIDEMIA, UNSPECIFIED HYPERLIPIDEMIA TYPE: ICD-10-CM

## 2021-01-20 DIAGNOSIS — R50.9 FEVER AND CHILLS: ICD-10-CM

## 2021-01-20 DIAGNOSIS — Z23 NEED FOR VACCINATION: ICD-10-CM

## 2021-01-20 DIAGNOSIS — I10 ESSENTIAL HYPERTENSION: Primary | ICD-10-CM

## 2021-01-20 PROCEDURE — 99214 OFFICE O/P EST MOD 30 MIN: CPT | Performed by: FAMILY MEDICINE

## 2021-01-20 RX ORDER — OCRELIZUMAB 300 MG/10ML
INJECTION INTRAVENOUS SEE ADMIN INSTRUCTIONS
COMMUNITY

## 2021-01-20 RX ORDER — LISINOPRIL 10 MG/1
10 TABLET ORAL DAILY
Qty: 30 TABLET | Refills: 1 | Status: SHIPPED | OUTPATIENT
Start: 2021-01-20 | End: 2021-03-03

## 2021-01-20 ASSESSMENT — PAIN SCALES - GENERAL: PAINLEVEL: SEVERE PAIN (6)

## 2021-01-20 ASSESSMENT — MIFFLIN-ST. JEOR: SCORE: 1966.84

## 2021-01-20 NOTE — PATIENT INSTRUCTIONS
Restart 10 mg Lisinopril daily.  Update us with BP readings in 2 weeks.    If still above 130/80 on average, will plan to increase to 20 mg and then recheck in a month or so.    Plan to recheck lipids, fasting, in 1 year, sooner if significant lifestyle changes - diet, exercise, weight.    Flu and tetanus vaccines declined.    Next episode of fever - please call and be seen in clinic if M-F, UC if weekend.  Would advise labs - CMP, CBC, sed rate, CRP.  Consider chest xray if any respiratory symptoms.  COVID swabbing as appropriate as well.

## 2021-01-20 NOTE — NURSING NOTE
Chief Complaint   Patient presents with     Hypertension     Lipids       Initial /88 (BP Location: Right arm, Patient Position: Sitting, Cuff Size: Adult Regular)   Pulse 82   Temp 97.7  F (36.5  C) (Tympanic)   Ht 1.829 m (6')   Wt 98.9 kg (218 lb)   SpO2 97%   BMI 29.57 kg/m   Estimated body mass index is 29.57 kg/m  as calculated from the following:    Height as of this encounter: 1.829 m (6').    Weight as of this encounter: 98.9 kg (218 lb).  Medication Reconciliation: complete  Brittani Guevara LPN

## 2021-01-20 NOTE — NURSING NOTE
Chief Complaint   Patient presents with     Hypertension       Initial /88 (BP Location: Right arm, Patient Position: Sitting, Cuff Size: Adult Regular)   Pulse 82   Temp 97.7  F (36.5  C) (Tympanic)   Ht 1.829 m (6')   Wt 98.9 kg (218 lb)   SpO2 97%   BMI 29.57 kg/m   Estimated body mass index is 29.57 kg/m  as calculated from the following:    Height as of this encounter: 1.829 m (6').    Weight as of this encounter: 98.9 kg (218 lb).  Medication Reconciliation: complete  Brittani Guevara LPN

## 2021-02-16 ENCOUNTER — MYC MEDICAL ADVICE (OUTPATIENT)
Dept: FAMILY MEDICINE | Facility: OTHER | Age: 36
End: 2021-02-16

## 2021-02-21 ENCOUNTER — E-VISIT (OUTPATIENT)
Dept: FAMILY MEDICINE | Facility: OTHER | Age: 36
End: 2021-02-21
Attending: FAMILY MEDICINE
Payer: COMMERCIAL

## 2021-02-21 DIAGNOSIS — Z53.9 ERRONEOUS ENCOUNTER--DISREGARD: Primary | ICD-10-CM

## 2021-03-24 ENCOUNTER — OFFICE VISIT (OUTPATIENT)
Dept: FAMILY MEDICINE | Facility: OTHER | Age: 36
End: 2021-03-24
Attending: FAMILY MEDICINE
Payer: COMMERCIAL

## 2021-03-24 VITALS
OXYGEN SATURATION: 95 % | RESPIRATION RATE: 20 BRPM | WEIGHT: 218 LBS | BODY MASS INDEX: 29.57 KG/M2 | SYSTOLIC BLOOD PRESSURE: 124 MMHG | DIASTOLIC BLOOD PRESSURE: 72 MMHG | TEMPERATURE: 98.5 F | HEART RATE: 77 BPM

## 2021-03-24 DIAGNOSIS — R51.9 FREQUENT HEADACHES: ICD-10-CM

## 2021-03-24 DIAGNOSIS — M54.2 NECK PAIN: ICD-10-CM

## 2021-03-24 DIAGNOSIS — I10 ESSENTIAL HYPERTENSION: Primary | ICD-10-CM

## 2021-03-24 DIAGNOSIS — M54.12 CERVICAL RADICULOPATHY: ICD-10-CM

## 2021-03-24 LAB
ANION GAP SERPL CALCULATED.3IONS-SCNC: 2 MMOL/L (ref 3–14)
BUN SERPL-MCNC: 14 MG/DL (ref 7–30)
CALCIUM SERPL-MCNC: 9.8 MG/DL (ref 8.5–10.1)
CHLORIDE SERPL-SCNC: 105 MMOL/L (ref 94–109)
CO2 SERPL-SCNC: 32 MMOL/L (ref 20–32)
CREAT SERPL-MCNC: 0.96 MG/DL (ref 0.66–1.25)
GFR SERPL CREATININE-BSD FRML MDRD: >90 ML/MIN/{1.73_M2}
GLUCOSE SERPL-MCNC: 82 MG/DL (ref 70–99)
POTASSIUM SERPL-SCNC: 4.1 MMOL/L (ref 3.4–5.3)
SODIUM SERPL-SCNC: 139 MMOL/L (ref 133–144)

## 2021-03-24 PROCEDURE — 80048 BASIC METABOLIC PNL TOTAL CA: CPT | Performed by: FAMILY MEDICINE

## 2021-03-24 PROCEDURE — 36415 COLL VENOUS BLD VENIPUNCTURE: CPT | Performed by: FAMILY MEDICINE

## 2021-03-24 PROCEDURE — 99214 OFFICE O/P EST MOD 30 MIN: CPT | Performed by: FAMILY MEDICINE

## 2021-03-24 RX ORDER — CYCLOBENZAPRINE HCL 10 MG
10 TABLET ORAL 2 TIMES DAILY PRN
Qty: 15 TABLET | Refills: 0 | Status: SHIPPED | OUTPATIENT
Start: 2021-03-24 | End: 2022-03-09

## 2021-03-24 ASSESSMENT — PAIN SCALES - GENERAL: PAINLEVEL: SEVERE PAIN (6)

## 2021-03-24 NOTE — PATIENT INSTRUCTIONS
Continue Lisinopril 20 mg daily.  Lab today -will call with notable findings.    PT referral placed.  Limited use of Flexeril for pain/spasm.  MRI if not improving.

## 2021-03-24 NOTE — PROGRESS NOTES
Assessment & Plan     Essential hypertension  At goal.  Continue 20 mg Lisinopril.  Update lab today.  - Basic metabolic panel    Neck pain  Known DDD with left cervical radiculopathy on 1/2020 MRI.  Progressed?  Additional muscle component/postural.  No distal hand symptoms.  No shoulder impingement on exam.  PT referral.   Limited Flexeril use.  MRI if not improving.  - PHYSICAL THERAPY REFERRAL; Future  - cyclobenzaprine (FLEXERIL) 10 MG tablet; Take 1 tablet (10 mg) by mouth 2 times daily as needed for muscle spasms    Frequent headaches  As above.  - cyclobenzaprine (FLEXERIL) 10 MG tablet; Take 1 tablet (10 mg) by mouth 2 times daily as needed for muscle spasms    Cervical radiculopathy  As above.  - PHYSICAL THERAPY REFERRAL; Future  - cyclobenzaprine (FLEXERIL) 10 MG tablet; Take 1 tablet (10 mg) by mouth 2 times daily as needed for muscle spasms       Patient Instructions   Continue Lisinopril 20 mg daily.  Lab today -will call with notable findings.    PT referral placed.  Limited use of Flexeril for pain/spasm.  MRI if not improving.            Nuha Barrientos MD  [facility] - JOSE Leigh is a 35 year old who presents for the following health issues     HPI     Musculoskeletal problem/pain - see my chart message 3/22/21 - left arm, shoulder, neck pain and headaches.  Pinched nerve?    Onset/Duration: On and off for 1-2 years, has gotten worse within the last 1-2 weeks; neck feels off - neck pain worse, then really bad headaches, then into arms  Description  Location: Left arm, left shoulder, neck-left side  Joint Swelling: no  Redness: no  Pain: YES  Warmth: no  Intensity:  moderate  Progression of Symptoms:  worsening  Accompanying signs and symptoms: headaches  Fevers: no  Numbness/tingling/weakness: no  History  Trauma to the area: no  Recent illness:  no  Previous similar problem: no  Previous evaluation:  no  Precipitating or alleviating factors:  Aggravating  factors include: none  Therapies tried and outcome: heat, ice and acetaminophen with minor temporary relief.  No chiropractic or physical therapy.    Pain is distracting.  Shooting pain.  Shoulder and upper arm.  No hand numbness/tingling/weakness.  Follows with neurology for MS.  They advised follow up with PCP.  Last MR cervical spine 1/2020 - new C4 white lesion.  There was also a C5/6 asymmetric left disc bulge compressing C6. Mild C6/7 left as well.    Hypertension Follow-up - Lisinopril 10 mg started 2/2021; increased to 20 mg few weeks ago.    Do you check your blood pressure regularly outside of the clinic? Yes     Are you following a low salt diet? No    Are your blood pressures ever more than 140 on the top number (systolic) OR more   than 90 on the bottom number (diastolic), for example 140/90? No  No side effects      Review of Systems   Constitutional, HEENT, cardiovascular, pulmonary, gi and gu systems are negative, except as otherwise noted.      Objective    /72 (BP Location: Right arm, Patient Position: Sitting, Cuff Size: Adult Regular)   Pulse 77   Temp 98.5  F (36.9  C) (Tympanic)   Resp 20   Wt 98.9 kg (218 lb)   SpO2 95%   BMI 29.57 kg/m    Body mass index is 29.57 kg/m .  Physical Exam   GENERAL: healthy, alert and no distress  NECK: no adenopathy, no asymmetry, masses, or scars and thyroid normal to palpation  RESP: lungs clear to auscultation - no rales, rhonchi or wheezes  CV: regular rate and rhythm, normal S1 S2, no S3 or S4, no murmur, click or rub, no peripheral edema and peripheral pulses strong  MS: normal muscle tone, normal range of motion neck and upper extremities, peripheral pulses normal and negative axial compression pain; negative Tinel and Phalen's; tender left trapezius, periscapular; negative Hawkin's, Neer's, empty can, external rotation  SKIN: no suspicious lesions or rashes  NEURO: Normal strength and tone, sensory exam grossly normal and mentation  intact  PSYCH: mentation appears normal and affect flat    Lab - bmp pending.

## 2021-03-31 ENCOUNTER — HOSPITAL ENCOUNTER (OUTPATIENT)
Dept: PHYSICAL THERAPY | Facility: HOSPITAL | Age: 36
Setting detail: THERAPIES SERIES
End: 2021-03-31
Attending: FAMILY MEDICINE
Payer: COMMERCIAL

## 2021-03-31 DIAGNOSIS — M54.12 CERVICAL RADICULOPATHY: ICD-10-CM

## 2021-03-31 DIAGNOSIS — M54.2 NECK PAIN: ICD-10-CM

## 2021-03-31 PROCEDURE — 97530 THERAPEUTIC ACTIVITIES: CPT | Mod: GP

## 2021-03-31 PROCEDURE — 97162 PT EVAL MOD COMPLEX 30 MIN: CPT | Mod: GP

## 2021-03-31 PROCEDURE — 97140 MANUAL THERAPY 1/> REGIONS: CPT | Mod: GP

## 2021-03-31 NOTE — PROGRESS NOTES
03/31/21 0816   General Information   Type of Visit Initial OP Ortho PT Evaluation   Start of Care Date 03/31/21   Referring Physician Nuha Hobbs MD   Orders Evaluate and Treat   Date of Order 03/24/21   Certification Required? No   Medical Diagnosis Neck pain, cervical radiculopathy   Surgical/Medical history reviewed Yes   Precautions/Limitations no known precautions/limitations   Body Part(s)   Body Part(s) Cervical Spine   Presentation and Etiology   Pertinent history of current problem (include personal factors and/or comorbidities that impact the POC) C/O neck, left arm and shoulder pain. Onset over many years, pain in arm began about two years ago. Both the arm and neck are on and off. About two weeks ago he did something that sets it all off. It is not predictable. It is usually better in the mornings. Once he works for awhile then it gets worse. He is a . 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 It's not bad right now because it is early in the day, but it can get excruciating as the day wears on. Has MS. Originally it was that the right arm and leg were numb.    Impairments A. Pain;F. Decreased strength and endurance;N. Headaches   Functional Limitations perform activities of daily living   How/Where did it occur From insidious onset   Onset date of current episode/exacerbation 03/31/19   Pain rating (0-10 point scale) Best (/10);Worst (/10);Other   Best (/10) 2   Worst (/10) 10   Pain rating comment 2-3/10 right now   Pain quality A. Sharp   Frequency of pain/symptoms B. Intermittent   Pain/symptoms exacerbated by C. Lifting;F. Nothing;G. Certain positions;H. Overhead reach;L. Work tasks   Pain/symptoms eased by D. Nothing   Current / Previous Interventions   Diagnostic Tests: MRI   MRI Results Results   MRI results Broad based annular bulge C5-C6 with compression of left C6 nerve  root; compression and bulging of C6-C7 disk with compression of C7 left nerve root   Current Level of Function   Current Community Support Family/friend caregiver   Patient role/employment history A. 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 No   Have you fallen and had an injury in the past year? No   Is patient a fall risk? No   Functional Scales   Functional Scales Other   Other Scales  Neck Disability Index   Cervical Spine   Posture slouched sitting posture contributes to chin-up posture   Palpation Occiput sheared anteriorly on C1, O-A restrcted. Right temporal bone posteriorly rotated, not synchronized with left temporal. Cervical spine, first and second rib, clavicle, humerus, scapulae all WFL of mobility bilaterally today. Leg lengths equal, pelvis and sacrum level.    Planned Therapy Interventions   Planned Therapy Interventions manual therapy   Clinical Impression   Criteria for Skilled Therapeutic Interventions Met yes, treatment indicated   PT Diagnosis Neck pain with radiculopathy mediated by bulging cervical disks, and by somatic dysfunction at occiput and tempral bones.   Clinical Presentation Evolving/Changing   Clinical Presentation Rationale clinical judgement   Clinical Decision Making (Complexity) Moderate complexity   Therapy Frequency 2 times/Week   Predicted Duration of Therapy Intervention (days/wks) 4-6 weeks   Risk & Benefits of therapy have been explained Yes   Patient, Family & other staff in agreement with plan of care Yes   Clinical Impression Comments Findings consistent with occipital and temporal hypomobility that facilitates hypermobility at cervical segments with daily activities   Education Assessment   Barriers to Learning No barriers   ORTHO GOALS   PT Ortho Eval Goals 1;2   Ortho Goal 1   Goal Identifier 1 Functional   Goal Description Ability  to perform normal ADLs and work tasks with pain no greater than 3/10   Target Date 05/12/21   Ortho Goal 2   Goal Identifier 2 Outcome   Goal Description Resolution of somatic dysfunction   Target Date 05/12/21   Total Evaluation Time   PT Eval, Moderate Complexity Minutes (35509) 15     Sindy Pedersen DPT

## 2021-04-01 ENCOUNTER — HOSPITAL ENCOUNTER (OUTPATIENT)
Dept: PHYSICAL THERAPY | Facility: HOSPITAL | Age: 36
Setting detail: THERAPIES SERIES
End: 2021-04-01
Attending: FAMILY MEDICINE
Payer: COMMERCIAL

## 2021-04-01 PROCEDURE — 97140 MANUAL THERAPY 1/> REGIONS: CPT | Mod: GP

## 2021-04-06 ENCOUNTER — HOSPITAL ENCOUNTER (OUTPATIENT)
Dept: PHYSICAL THERAPY | Facility: HOSPITAL | Age: 36
Setting detail: THERAPIES SERIES
End: 2021-04-06
Attending: FAMILY MEDICINE
Payer: COMMERCIAL

## 2021-04-06 PROCEDURE — 999N000214 HC STATISTIC PT OUTPT VISIT

## 2021-04-09 ENCOUNTER — HOSPITAL ENCOUNTER (OUTPATIENT)
Dept: PHYSICAL THERAPY | Facility: HOSPITAL | Age: 36
Setting detail: THERAPIES SERIES
End: 2021-04-09
Attending: FAMILY MEDICINE
Payer: COMMERCIAL

## 2021-04-09 PROCEDURE — 97110 THERAPEUTIC EXERCISES: CPT | Mod: GP

## 2021-04-09 PROCEDURE — 97140 MANUAL THERAPY 1/> REGIONS: CPT | Mod: GP

## 2021-04-16 ENCOUNTER — HOSPITAL ENCOUNTER (OUTPATIENT)
Dept: PHYSICAL THERAPY | Facility: HOSPITAL | Age: 36
Setting detail: THERAPIES SERIES
End: 2021-04-16
Attending: FAMILY MEDICINE
Payer: COMMERCIAL

## 2021-04-16 PROCEDURE — 999N000214 HC STATISTIC PT OUTPT VISIT

## 2021-04-16 PROCEDURE — 97012 MECHANICAL TRACTION THERAPY: CPT | Mod: GP

## 2021-04-19 ENCOUNTER — HOSPITAL ENCOUNTER (OUTPATIENT)
Dept: PHYSICAL THERAPY | Facility: HOSPITAL | Age: 36
Setting detail: THERAPIES SERIES
End: 2021-04-19
Attending: FAMILY MEDICINE
Payer: COMMERCIAL

## 2021-04-19 PROCEDURE — 97140 MANUAL THERAPY 1/> REGIONS: CPT | Mod: GP

## 2021-04-23 ENCOUNTER — HOSPITAL ENCOUNTER (OUTPATIENT)
Dept: PHYSICAL THERAPY | Facility: HOSPITAL | Age: 36
Setting detail: THERAPIES SERIES
End: 2021-04-23
Attending: FAMILY MEDICINE
Payer: COMMERCIAL

## 2021-04-23 PROCEDURE — 97140 MANUAL THERAPY 1/> REGIONS: CPT | Mod: GP

## 2021-04-26 ENCOUNTER — HOSPITAL ENCOUNTER (OUTPATIENT)
Dept: PHYSICAL THERAPY | Facility: HOSPITAL | Age: 36
Setting detail: THERAPIES SERIES
End: 2021-04-26
Attending: FAMILY MEDICINE
Payer: COMMERCIAL

## 2021-04-26 PROCEDURE — 97140 MANUAL THERAPY 1/> REGIONS: CPT | Mod: GP

## 2021-04-28 ENCOUNTER — HOSPITAL ENCOUNTER (OUTPATIENT)
Dept: PHYSICAL THERAPY | Facility: HOSPITAL | Age: 36
Setting detail: THERAPIES SERIES
End: 2021-04-28
Attending: FAMILY MEDICINE
Payer: COMMERCIAL

## 2021-04-28 PROCEDURE — 97012 MECHANICAL TRACTION THERAPY: CPT | Mod: GP

## 2021-04-28 PROCEDURE — 97140 MANUAL THERAPY 1/> REGIONS: CPT | Mod: GP,59

## 2021-05-05 ENCOUNTER — HOSPITAL ENCOUNTER (OUTPATIENT)
Dept: PHYSICAL THERAPY | Facility: HOSPITAL | Age: 36
Setting detail: THERAPIES SERIES
End: 2021-05-05
Attending: FAMILY MEDICINE
Payer: COMMERCIAL

## 2021-05-05 PROCEDURE — 97012 MECHANICAL TRACTION THERAPY: CPT | Mod: GP

## 2021-05-05 PROCEDURE — 97140 MANUAL THERAPY 1/> REGIONS: CPT | Mod: GP,59

## 2021-05-10 ENCOUNTER — HOSPITAL ENCOUNTER (OUTPATIENT)
Dept: PHYSICAL THERAPY | Facility: HOSPITAL | Age: 36
Setting detail: THERAPIES SERIES
End: 2021-05-10
Attending: FAMILY MEDICINE
Payer: COMMERCIAL

## 2021-05-10 PROCEDURE — 97140 MANUAL THERAPY 1/> REGIONS: CPT | Mod: GP,59

## 2021-05-10 PROCEDURE — 97012 MECHANICAL TRACTION THERAPY: CPT | Mod: GP

## 2021-05-19 ENCOUNTER — HOSPITAL ENCOUNTER (OUTPATIENT)
Dept: PHYSICAL THERAPY | Facility: HOSPITAL | Age: 36
Setting detail: THERAPIES SERIES
End: 2021-05-19
Attending: FAMILY MEDICINE
Payer: COMMERCIAL

## 2021-05-19 ENCOUNTER — TELEPHONE (OUTPATIENT)
Dept: FAMILY MEDICINE | Facility: OTHER | Age: 36
End: 2021-05-19

## 2021-05-19 PROCEDURE — 97012 MECHANICAL TRACTION THERAPY: CPT | Mod: GP

## 2021-05-19 NOTE — TELEPHONE ENCOUNTER
Note copied into phone note to be able review/act on it.    Agree with assessment.  Will see him in office and place orders/referrasl as appropriate.    Thank you!    Zee Romero, PT  Nuha Hobbs MD             S: Patient has been seen for 10 PT sessions 3/31/21 to 5/19/21. During this time symptoms have remained at 2-4/10 pain from neck extending to but not extending past L elbow. PT interventions have included manual techniques, therapeutic exercise, cervical traction, postural education and home program.     B: Patient has been having intermittently neck and UE pain for several years that was aggravated into higher severity symptoms in March 2021. MRI from 1/2020: There is   broad-based annular bulging at C5-C6 this is asymmetric greater on the   left than the right there is compression of the left side of the   cervical cord and compression of both C6 nerve roots worse on the left   than the right. There is some mild annular bulging at C6-C7 with mild   impingement on the left C7 nerve root.     A: Symptoms due coincide the imaging findings of impingement of  L C7 nerve root ad compression of L and R C6 nerve roots. Have been unable to address with skilled PT services and conservative management.     R: Recommending return to primary care. PT services have not been able to address patient's symptoms. Patient made be a good candidate for cervical injection or other intervention as deemed appropriate. Patient has been instructed to make follow up with primary care provider. Please call with any questions. Thank you.

## 2021-05-27 ENCOUNTER — MEDICAL CORRESPONDENCE (OUTPATIENT)
Dept: MRI IMAGING | Facility: HOSPITAL | Age: 36
End: 2021-05-27

## 2021-06-15 ENCOUNTER — HOSPITAL ENCOUNTER (OUTPATIENT)
Dept: MRI IMAGING | Facility: HOSPITAL | Age: 36
End: 2021-06-15
Attending: PSYCHIATRY & NEUROLOGY
Payer: COMMERCIAL

## 2021-06-15 DIAGNOSIS — G35 MULTIPLE SCLEROSIS (H): ICD-10-CM

## 2021-06-15 PROCEDURE — 255N000002 HC RX 255 OP 636: Performed by: RADIOLOGY

## 2021-06-15 PROCEDURE — A9585 GADOBUTROL INJECTION: HCPCS | Performed by: RADIOLOGY

## 2021-06-15 PROCEDURE — 72156 MRI NECK SPINE W/O & W/DYE: CPT

## 2021-06-15 PROCEDURE — 70553 MRI BRAIN STEM W/O & W/DYE: CPT

## 2021-06-15 PROCEDURE — 72157 MRI CHEST SPINE W/O & W/DYE: CPT

## 2021-06-15 RX ORDER — GADOBUTROL 604.72 MG/ML
10 INJECTION INTRAVENOUS ONCE
Status: COMPLETED | OUTPATIENT
Start: 2021-06-15 | End: 2021-06-15

## 2021-06-15 RX ADMIN — GADOBUTROL 10 ML: 604.72 INJECTION INTRAVENOUS at 18:05

## 2021-07-05 DIAGNOSIS — I10 ESSENTIAL HYPERTENSION: ICD-10-CM

## 2021-07-07 RX ORDER — LISINOPRIL 20 MG/1
TABLET ORAL
Qty: 90 TABLET | Refills: 1 | Status: SHIPPED | OUTPATIENT
Start: 2021-07-07 | End: 2022-01-12

## 2021-07-12 ENCOUNTER — TELEPHONE (OUTPATIENT)
Dept: FAMILY MEDICINE | Facility: OTHER | Age: 36
End: 2021-07-12

## 2021-07-12 DIAGNOSIS — I10 ESSENTIAL HYPERTENSION: ICD-10-CM

## 2021-07-12 NOTE — TELEPHONE ENCOUNTER
Pt called with issues with pharmacy stating they have not received refill on lisinopril. Refill noted ordered on our end on 7/7/21. Called pharmacy to discuss issue.issue resolved. Pt notified. Brittani Guevara LPN

## 2021-07-13 RX ORDER — LISINOPRIL 20 MG/1
TABLET ORAL
Qty: 90 TABLET | Refills: 1 | OUTPATIENT
Start: 2021-07-13

## 2021-09-02 ENCOUNTER — HOSPITAL ENCOUNTER (EMERGENCY)
Facility: HOSPITAL | Age: 36
Discharge: HOME | End: 2021-09-02
Attending: NURSE PRACTITIONER | Admitting: NURSE PRACTITIONER
Payer: COMMERCIAL

## 2021-09-02 ENCOUNTER — APPOINTMENT (OUTPATIENT)
Dept: GENERAL RADIOLOGY | Facility: HOSPITAL | Age: 36
End: 2021-09-02
Attending: NURSE PRACTITIONER
Payer: COMMERCIAL

## 2021-09-02 ENCOUNTER — TELEPHONE (OUTPATIENT)
Dept: EMERGENCY MEDICINE | Facility: HOSPITAL | Age: 36
End: 2021-09-02

## 2021-09-02 VITALS
SYSTOLIC BLOOD PRESSURE: 134 MMHG | HEART RATE: 70 BPM | OXYGEN SATURATION: 98 % | RESPIRATION RATE: 18 BRPM | DIASTOLIC BLOOD PRESSURE: 96 MMHG | TEMPERATURE: 98.7 F

## 2021-09-02 DIAGNOSIS — J32.9 SINUSITIS: Primary | ICD-10-CM

## 2021-09-02 LAB
ALBUMIN SERPL-MCNC: 4.3 G/DL (ref 3.4–5)
ALP SERPL-CCNC: 96 U/L (ref 40–150)
ALT SERPL W P-5'-P-CCNC: 65 U/L (ref 0–70)
ANION GAP SERPL CALCULATED.3IONS-SCNC: 7 MMOL/L (ref 3–14)
AST SERPL W P-5'-P-CCNC: 27 U/L (ref 0–45)
BASOPHILS # BLD AUTO: 0.1 10E3/UL (ref 0–0.2)
BASOPHILS NFR BLD AUTO: 1 %
BILIRUB SERPL-MCNC: 0.6 MG/DL (ref 0.2–1.3)
BUN SERPL-MCNC: 16 MG/DL (ref 7–30)
CALCIUM SERPL-MCNC: 9.2 MG/DL (ref 8.5–10.1)
CHLORIDE BLD-SCNC: 103 MMOL/L (ref 94–109)
CO2 SERPL-SCNC: 27 MMOL/L (ref 20–32)
CREAT SERPL-MCNC: 1.01 MG/DL (ref 0.66–1.25)
CRP SERPL-MCNC: <2.9 MG/L (ref 0–8)
EOSINOPHIL # BLD AUTO: 0.3 10E3/UL (ref 0–0.7)
EOSINOPHIL NFR BLD AUTO: 2 %
ERYTHROCYTE [DISTWIDTH] IN BLOOD BY AUTOMATED COUNT: 12.1 % (ref 10–15)
GFR SERPL CREATININE-BSD FRML MDRD: >90 ML/MIN/1.73M2
GLUCOSE BLD-MCNC: 97 MG/DL (ref 70–99)
HCT VFR BLD AUTO: 49.5 % (ref 40–53)
HGB BLD-MCNC: 17.2 G/DL (ref 13.3–17.7)
IMM GRANULOCYTES # BLD: 0 10E3/UL
IMM GRANULOCYTES NFR BLD: 0 %
LYMPHOCYTES # BLD AUTO: 1.5 10E3/UL (ref 0.8–5.3)
LYMPHOCYTES NFR BLD AUTO: 12 %
MCH RBC QN AUTO: 31.1 PG (ref 26.5–33)
MCHC RBC AUTO-ENTMCNC: 34.7 G/DL (ref 31.5–36.5)
MCV RBC AUTO: 90 FL (ref 78–100)
MONOCYTES # BLD AUTO: 1.1 10E3/UL (ref 0–1.3)
MONOCYTES NFR BLD AUTO: 9 %
NEUTROPHILS # BLD AUTO: 9.2 10E3/UL (ref 1.6–8.3)
NEUTROPHILS NFR BLD AUTO: 76 %
NRBC # BLD AUTO: 0 10E3/UL
NRBC BLD AUTO-RTO: 0 /100
PLATELET # BLD AUTO: 201 10E3/UL (ref 150–450)
POTASSIUM BLD-SCNC: 4.1 MMOL/L (ref 3.4–5.3)
PROT SERPL-MCNC: 7.5 G/DL (ref 6.8–8.8)
RBC # BLD AUTO: 5.53 10E6/UL (ref 4.4–5.9)
SARS-COV-2 RNA RESP QL NAA+PROBE: POSITIVE
SODIUM SERPL-SCNC: 137 MMOL/L (ref 133–144)
WBC # BLD AUTO: 12.3 10E3/UL (ref 4–11)

## 2021-09-02 PROCEDURE — 36415 COLL VENOUS BLD VENIPUNCTURE: CPT | Performed by: NURSE PRACTITIONER

## 2021-09-02 PROCEDURE — 71046 X-RAY EXAM CHEST 2 VIEWS: CPT

## 2021-09-02 PROCEDURE — 80053 COMPREHEN METABOLIC PANEL: CPT | Performed by: NURSE PRACTITIONER

## 2021-09-02 PROCEDURE — 99213 OFFICE O/P EST LOW 20 MIN: CPT | Performed by: NURSE PRACTITIONER

## 2021-09-02 PROCEDURE — U0003 INFECTIOUS AGENT DETECTION BY NUCLEIC ACID (DNA OR RNA); SEVERE ACUTE RESPIRATORY SYNDROME CORONAVIRUS 2 (SARS-COV-2) (CORONAVIRUS DISEASE [COVID-19]), AMPLIFIED PROBE TECHNIQUE, MAKING USE OF HIGH THROUGHPUT TECHNOLOGIES AS DESCRIBED BY CMS-2020-01-R: HCPCS | Performed by: NURSE PRACTITIONER

## 2021-09-02 PROCEDURE — G0463 HOSPITAL OUTPT CLINIC VISIT: HCPCS | Mod: 25

## 2021-09-02 PROCEDURE — 85025 COMPLETE CBC W/AUTO DIFF WBC: CPT | Performed by: NURSE PRACTITIONER

## 2021-09-02 PROCEDURE — C9803 HOPD COVID-19 SPEC COLLECT: HCPCS

## 2021-09-02 PROCEDURE — 86140 C-REACTIVE PROTEIN: CPT | Performed by: NURSE PRACTITIONER

## 2021-09-02 RX ORDER — DOXYCYCLINE 100 MG/1
100 CAPSULE ORAL 2 TIMES DAILY
Qty: 14 CAPSULE | Refills: 0 | Status: SHIPPED | OUTPATIENT
Start: 2021-09-02 | End: 2021-09-09

## 2021-09-02 ASSESSMENT — ENCOUNTER SYMPTOMS
CHILLS: 0
SORE THROAT: 0
NECK PAIN: 0
PALPITATIONS: 0
RHINORRHEA: 0
LIGHT-HEADEDNESS: 0
EYE ITCHING: 0
SINUS PRESSURE: 0
EYE PAIN: 0
COUGH: 0
SINUS PAIN: 0
SHORTNESS OF BREATH: 1
NECK STIFFNESS: 0
EYE REDNESS: 1
CHEST TIGHTNESS: 1
VOMITING: 0
FEVER: 0
DIZZINESS: 0
NAUSEA: 0
HEADACHES: 1
FATIGUE: 1
MYALGIAS: 0
DIARRHEA: 1
WEAKNESS: 0
PSYCHIATRIC NEGATIVE: 1

## 2021-09-02 NOTE — ED TRIAGE NOTES
Patient presents stating he's having to breathe with his mouth open. Denies any SOB, but states wondering if maybe his MS is acting up as he feels he has to swallow or clear his throat more often then usual.

## 2021-09-02 NOTE — ED TRIAGE NOTES
Pt is here with c/o feeling like he has to breath through his mouth more  Pt notes has sinus issue and feels like he is swallowing more and slight sore throat  Pt denies all other sx in the room

## 2021-09-02 NOTE — DISCHARGE INSTRUCTIONS
Doxycycline as ordered  - Take entire course of antibiotic even if you start to feel better.  - Antibiotics can cause stomach upset including nausea and diarrhea. Read your bottle or ask the pharmacist if antibiotic can be taken with food to help prevent nausea. If you have symptoms of diarrhea you can take an over-the-counter probiotic and/or increase foods with probiotics such as yogurt, Normangee, sauerkraut.    Follow-up with primary care provider or return to urgent care-ED with any worsening in condition or additional concerns.

## 2021-09-02 NOTE — ED PROVIDER NOTES
History     Chief Complaint   Patient presents with     States has to breathe with mouth open     HPI  Reese Sanchez is a 36 year old male who presents to urgent care today with complaints of increased fatigue, congestion, eye redness, chest tightness, SOB, diarrhea and headache.  Symptoms not consistent with a typical MS flare up that patient has had in the past.  No known sick contact.  Denies fever and chills.  Staying hydrated.  Declines COVID Test.  No other concerns.      Allergies:  No Known Allergies    Problem List:    Patient Active Problem List    Diagnosis Date Noted     Hyperlipidemia, unspecified hyperlipidemia type 01/20/2021     Priority: Medium     Essential hypertension 11/25/2020     Priority: Medium     MS (multiple sclerosis) (H) 01/29/2019     Priority: Medium        Past Medical History:    No past medical history on file.    Past Surgical History:    Past Surgical History:   Procedure Laterality Date     APPENDECTOMY       IR LUMBAR PUNCTURE  2/7/2019     wisdom teeth extraction         Family History:    Family History   Problem Relation Age of Onset     Arrhythmia Maternal Grandmother      Heart Disease Maternal Grandfather      Heart Disease Paternal Grandfather        Social History:  Marital Status:   [2]  Social History     Tobacco Use     Smoking status: Former Smoker     Smokeless tobacco: Never Used   Substance Use Topics     Alcohol use: No     Drug use: No        Medications:    doxycycline hyclate (VIBRAMYCIN) 100 MG capsule  acetaminophen (TYLENOL) 500 MG tablet  albuterol (PROAIR HFA/PROVENTIL HFA/VENTOLIN HFA) 108 (90 Base) MCG/ACT inhaler  cyclobenzaprine (FLEXERIL) 10 MG tablet  lisinopril (ZESTRIL) 20 MG tablet  ocrelizumab (OCREVUS) 300 MG/10ML SOLN injection      Review of Systems   Constitutional: Positive for fatigue. Negative for chills and fever.   HENT: Positive for congestion. Negative for ear pain, rhinorrhea, sinus pressure, sinus pain and sore throat.     Eyes: Positive for redness. Negative for pain and itching.   Respiratory: Positive for chest tightness and shortness of breath. Negative for cough.    Cardiovascular: Negative for chest pain and palpitations.   Gastrointestinal: Positive for diarrhea. Negative for nausea and vomiting.   Musculoskeletal: Negative for gait problem, myalgias, neck pain and neck stiffness.   Skin: Negative for rash.   Neurological: Positive for headaches. Negative for dizziness, weakness and light-headedness.   Psychiatric/Behavioral: Negative.      Physical Exam   BP: 134/96  Pulse: 70  Temp: 98.7  F (37.1  C)  Resp: 18  SpO2: 98 %    Physical Exam  Vitals and nursing note reviewed.   Constitutional:       General: He is not in acute distress.     Appearance: He is not ill-appearing.   HENT:      Head: Normocephalic.      Right Ear: Tympanic membrane, ear canal and external ear normal.      Left Ear: Tympanic membrane, ear canal and external ear normal.      Nose: Congestion present. No rhinorrhea.      Right Sinus: Maxillary sinus tenderness present. No frontal sinus tenderness.      Left Sinus: Maxillary sinus tenderness present. No frontal sinus tenderness.      Mouth/Throat:      Mouth: Mucous membranes are moist.      Pharynx: Oropharynx is clear. No posterior oropharyngeal erythema.   Eyes:      Extraocular Movements: Extraocular movements intact.      Conjunctiva/sclera: Conjunctivae normal.      Pupils: Pupils are equal, round, and reactive to light.   Cardiovascular:      Rate and Rhythm: Normal rate and regular rhythm.      Pulses: Normal pulses.      Heart sounds: Normal heart sounds.   Pulmonary:      Effort: Pulmonary effort is normal.      Breath sounds: Normal breath sounds.   Abdominal:      General: Bowel sounds are normal.      Palpations: Abdomen is soft.   Musculoskeletal:      Cervical back: Normal range of motion and neck supple. No rigidity or tenderness.   Lymphadenopathy:      Cervical: Cervical adenopathy  present.   Skin:     General: Skin is warm and dry.      Capillary Refill: Capillary refill takes less than 2 seconds.   Neurological:      Mental Status: He is alert.   Psychiatric:         Mood and Affect: Mood normal.       ED Course     Results for orders placed or performed during the hospital encounter of 09/02/21 (from the past 24 hour(s))   Chest XR,  PA & LAT    Narrative    PROCEDURE:  XR CHEST 2 VW    HISTORY: SOB, .    COMPARISON:  2/12/20    FINDINGS:  The cardiomediastinal contours are normal. The trachea is midline.  No focal consolidation, effusion or pneumothorax.    No suspicious osseous lesion or subdiaphragmatic free air.      Impression    IMPRESSION:      No acute cardiopulmonary process.      SHERI WELCH MD         SYSTEM ID:  [id number]   CBC with platelets differential    Narrative    The following orders were created for panel order CBC with platelets differential.  Procedure                               Abnormality         Status                     ---------                               -----------         ------                     CBC with platelets and d...[[id number]]  Abnormal            Final result                 Please view results for these tests on the individual orders.   Comprehensive metabolic panel   Result Value Ref Range    Sodium 137 133 - 144 mmol/L    Potassium 4.1 3.4 - 5.3 mmol/L    Chloride 103 94 - 109 mmol/L    Carbon Dioxide (CO2) 27 20 - 32 mmol/L    Anion Gap 7 3 - 14 mmol/L    Urea Nitrogen 16 7 - 30 mg/dL    Creatinine 1.01 0.66 - 1.25 mg/dL    Calcium 9.2 8.5 - 10.1 mg/dL    Glucose 97 70 - 99 mg/dL    Alkaline Phosphatase 96 40 - 150 U/L    AST 27 0 - 45 U/L    ALT 65 0 - 70 U/L    Protein Total 7.5 6.8 - 8.8 g/dL    Albumin 4.3 3.4 - 5.0 g/dL    Bilirubin Total 0.6 0.2 - 1.3 mg/dL    GFR Estimate >90 >60 mL/min/1.73m2   CRP inflammation   Result Value Ref Range    CRP Inflammation <2.9 0.0 - 8.0 mg/L   CBC with platelets and differential   Result  Value Ref Range    WBC Count 12.3 (H) 4.0 - 11.0 10e3/uL    RBC Count 5.53 4.40 - 5.90 10e6/uL    Hemoglobin 17.2 13.3 - 17.7 g/dL    Hematocrit 49.5 40.0 - 53.0 %    MCV 90 78 - 100 fL    MCH 31.1 26.5 - 33.0 pg    MCHC 34.7 31.5 - 36.5 g/dL    RDW 12.1 10.0 - 15.0 %    Platelet Count 201 150 - 450 10e3/uL    % Neutrophils 76 %    % Lymphocytes 12 %    % Monocytes 9 %    % Eosinophils 2 %    % Basophils 1 %    % Immature Granulocytes 0 %    NRBCs per 100 WBC 0 <1 /100    Absolute Neutrophils 9.2 (H) 1.6 - 8.3 10e3/uL    Absolute Lymphocytes 1.5 0.8 - 5.3 10e3/uL    Absolute Monocytes 1.1 0.0 - 1.3 10e3/uL    Absolute Eosinophils 0.3 0.0 - 0.7 10e3/uL    Absolute Basophils 0.1 0.0 - 0.2 10e3/uL    Absolute Immature Granulocytes 0.0 <=0.0 10e3/uL    Absolute NRBCs 0.0 10e3/uL       Medications - No data to display    Assessments & Plan (with Medical Decision Making)     I have reviewed the nursing notes.    I have reviewed the findings, diagnosis, plan and need for follow up with the patient.  (J32.9) Sinusitis  (primary encounter diagnosis)  Plan:   Patient arrived to urgent care today with complaints of fatigue, congestion, red eyes, chest tightness, shortness of breath, diarrhea and headache.  Patient has nontoxic appearance and no SOB at this time.  Patient states symptoms started this past week.  Patient wondering if symptoms are consistent with an MS flareup.  Patient states symptoms are not consistent with previous MS flareups but is not sure if that is what it is.  Patient wanted labs completed today.  Stated to patient that symptoms are consistent with Covid and other URI symptoms going around.  Patient declined Covid test throughout appointment.  Patient has not had Covid in the past.  No Covid vaccines.  Chest x-ray completed and impression shows no acute cardiopulmonary process.  CRP WNL.  CMP WNL.  CBC shows WBC count elevated at 12.3 patient concerned about elevation with MS history.  Will prescribe  doxycycline for sinusitis and elevated WBC.  Patient declines prednisone or albuterol at this time for shortness of breath.  Upon leaving appointment patient decided he would take Covid test.  Covid test ordered and will notify patient of results when they return.  Patient to follow-up with primary care provider or return to urgent care-ED with any worsening in condition or additional concerns.  Patient in agreement with treatment plan.    New Prescriptions    DOXYCYCLINE HYCLATE (VIBRAMYCIN) 100 MG CAPSULE    Take 1 capsule (100 mg) by mouth 2 times daily for 7 days     Final diagnoses:   Sinusitis     9/2/2021   [facility]     Dinora Rodgers NP  09/02/21 [id number]

## 2021-09-03 NOTE — TELEPHONE ENCOUNTER
Coronavirus (COVID-19) Notification    Reason for call  Notify of POSITIVE  COVID-19 lab result, assess symptoms,  review [facility] recommendations    Lab Result   Lab test for 2019-nCoV rRt-PCR or SARS-COV-2 PCR  Oropharyngeal AND/OR nasopharyngeal swabs were POSITIVE for 2019-nCoV RNA [OR] SARS-COV-2 RNA (COVID-19) RNA     We have been unable to reach Patient by phone at this time to notify of their Positive COVID-19 result.  Left voicemail message requesting a call back to [phone] [facility] for results.        POSITIVE COVID-19 Letter sent.    Rula Muse LPN

## 2021-09-25 ENCOUNTER — HEALTH MAINTENANCE LETTER (OUTPATIENT)
Age: 36
End: 2021-09-25

## 2022-01-15 ENCOUNTER — HEALTH MAINTENANCE LETTER (OUTPATIENT)
Age: 37
End: 2022-01-15

## 2022-03-06 NOTE — PATIENT INSTRUCTIONS
Consider fasting labs - call to schedule when coming.    Trial of Neilmed sinus rinses twice daily - distilled water with packet.  Use OTC nasal steroid allergy spray once daily - take consistently - takes time to work.  Ex: Flonase, Nasonex, Rhinocort.  ENT referral if not improving over next few weeks.    Lisinopril refilled.      Preventive Health Recommendations  Male Ages 26 - 39    Yearly exam:             See your health care provider every year in order to  o   Review health changes.   o   Discuss preventive care.    o   Review your medicines if your doctor has prescribed any.    You should be tested each year for STDs (sexually transmitted diseases), if you re at risk.     After age 35, talk to your provider about cholesterol testing. If you are at risk for heart disease, have your cholesterol tested at least every 5 years.     If you are at risk for diabetes, you should have a diabetes test (fasting glucose).  Shots: Get a flu shot each year. Get a tetanus shot every 10 years.     Nutrition:    Eat at least 5 servings of fruits and vegetables daily.     Eat whole-grain bread, whole-wheat pasta and brown rice instead of white grains and rice.     Get adequate Calcium and Vitamin D.     Lifestyle    Exercise for at least 150 minutes a week (30 minutes a day, 5 days a week). This will help you control your weight and prevent disease.     Limit alcohol to one drink per day.     No smoking.     Wear sunscreen to prevent skin cancer.     See your dentist every six months for an exam and cleaning.

## 2022-03-06 NOTE — PROGRESS NOTES
SUBJECTIVE:   CC: Reese Sanchez is an 36 year old male who presents for preventative health visit.       HPI    {Outside tests to abstract? :620841}    Hyperlipidemia Follow-Up      Are you regularly taking any medication or supplement to lower your cholesterol?   { :151147}    Are you having muscle aches or other side effects that you think could be caused by your cholesterol lowering medication?  { :444158}    Hypertension Follow-up      Do you check your blood pressure regularly outside of the clinic? { :724273}     Are you following a low salt diet? { :001672}    Are your blood pressures ever more than 140 on the top number (systolic) OR more   than 90 on the bottom number (diastolic), for example 140/90? { :976141}      Today's PHQ-2 Score:   PHQ-2 ( 1999 Pfizer) 1/20/2021   Q1: Little interest or pleasure in doing things 0   Q2: Feeling down, depressed or hopeless 0   PHQ-2 Score 0   PHQ-2 Total Score (12-17 Years)- Positive if 3 or more points; Administer PHQ-A if positive 0       Abuse: Current or Past(Physical, Sexual or Emotional)- { :774694}  Do you feel safe in your environment? { :545633}    Have you ever done Advance Care Planning? (For example, a Health Directive, POLST, or a discussion with a medical provider or your loved ones about your wishes): { :904904}    Social History     Tobacco Use     Smoking status: Former Smoker     Smokeless tobacco: Never Used   Substance Use Topics     Alcohol use: No     {Rooming Staff- Complete this question if Prescreen response is not shown below for today's visit. If you drink alcohol do you typically have >3 drinks per day or >7 drinks per week? (Optional):481334}    Alcohol Use 5/31/2018   Prescreen: >3 drinks/day or >7 drinks/week? No   AUDIT SCORE  2   {add AUDIT responses (Optional) (A score of 7 for adult men is an indication of hazardous drinking; a score of 8 or more is an indication of an alcohol use disorder.  A score of 7 or more for adult women is an  "indication of hazardous drinking or an alchohol use disorder):732118}    Last PSA: No results found for: PSA    Reviewed orders with patient. Reviewed health maintenance and updated orders accordingly - { :383022::\"Yes\"}  {Chronicprobdata (optional):765966}    Reviewed and updated as needed this visit by clinical staff                  Reviewed and updated as needed this visit by Provider                 {HISTORY OPTIONS (Optional):933024}    Review of Systems  {MALE ROS (Optional):752261::\"CONSTITUTIONAL: NEGATIVE for fever, chills, change in weight\",\"INTEGUMENTARY/SKIN: NEGATIVE for worrisome rashes, moles or lesions\",\"EYES: NEGATIVE for vision changes or irritation\",\"ENT: NEGATIVE for ear, mouth and throat problems\",\"RESP: NEGATIVE for significant cough or SOB\",\"CV: NEGATIVE for chest pain, palpitations or peripheral edema\",\"GI: NEGATIVE for nausea, abdominal pain, heartburn, or change in bowel habits\",\" male: negative for dysuria, hematuria, decreased urinary stream, erectile dysfunction, urethral discharge\",\"MUSCULOSKELETAL: NEGATIVE for significant arthralgias or myalgia\",\"NEURO: NEGATIVE for weakness, dizziness or paresthesias\",\"PSYCHIATRIC: NEGATIVE for changes in mood or affect\"}    OBJECTIVE:   There were no vitals taken for this visit.    Physical Exam  {Exam Choices (Optional):933052}    {Diagnostic Test Results (Optional):883620::\"Diagnostic Test Results:\",\"Labs reviewed in Epic\"}    ASSESSMENT/PLAN:   {Diag Picklist:056188}    {Patient advised of split billing (Optional):942472}    COUNSELING:   {MALE COUNSELING MESSAGES:489911::\"Reviewed preventive health counseling, as reflected in patient instructions\"}    Estimated body mass index is 29.57 kg/m  as calculated from the following:    Height as of 1/20/21: 1.829 m (6').    Weight as of 3/24/21: 98.9 kg (218 lb).     {Weight Management Plan (ACO) Complete if BMI is abnormal-  Ages 18-64  BMI >24.9.  Age 65+ with BMI <23 or >30 " (Optional):759094}    He reports that he has quit smoking. He has never used smokeless tobacco.      Counseling Resources:  ATP IV Guidelines  Pooled Cohorts Equation Calculator  FRAX Risk Assessment  ICSI Preventive Guidelines  Dietary Guidelines for Americans, 2010  USDA's MyPlate  ASA Prophylaxis  Lung CA Screening    Nuha Barrientos MD  [facility]

## 2022-03-09 ENCOUNTER — OFFICE VISIT (OUTPATIENT)
Dept: FAMILY MEDICINE | Facility: OTHER | Age: 37
End: 2022-03-09
Attending: FAMILY MEDICINE
Payer: COMMERCIAL

## 2022-03-09 VITALS
BODY MASS INDEX: 27.46 KG/M2 | OXYGEN SATURATION: 96 % | WEIGHT: 214 LBS | HEART RATE: 87 BPM | TEMPERATURE: 98.4 F | SYSTOLIC BLOOD PRESSURE: 126 MMHG | HEIGHT: 74 IN | DIASTOLIC BLOOD PRESSURE: 74 MMHG

## 2022-03-09 DIAGNOSIS — Z13.1 SCREENING FOR DIABETES MELLITUS: ICD-10-CM

## 2022-03-09 DIAGNOSIS — Z13.220 LIPID SCREENING: ICD-10-CM

## 2022-03-09 DIAGNOSIS — R53.83 FATIGUE, UNSPECIFIED TYPE: ICD-10-CM

## 2022-03-09 DIAGNOSIS — I10 ESSENTIAL HYPERTENSION: ICD-10-CM

## 2022-03-09 DIAGNOSIS — R06.00 DYSPNEA, UNSPECIFIED TYPE: ICD-10-CM

## 2022-03-09 DIAGNOSIS — Z00.00 ROUTINE GENERAL MEDICAL EXAMINATION AT A HEALTH CARE FACILITY: Primary | ICD-10-CM

## 2022-03-09 PROCEDURE — 99395 PREV VISIT EST AGE 18-39: CPT | Performed by: FAMILY MEDICINE

## 2022-03-09 RX ORDER — LISINOPRIL 20 MG/1
20 TABLET ORAL DAILY
Qty: 90 TABLET | Refills: 3 | Status: SHIPPED | OUTPATIENT
Start: 2022-03-09 | End: 2023-06-08

## 2022-03-09 ASSESSMENT — PAIN SCALES - GENERAL: PAINLEVEL: NO PAIN (0)

## 2022-03-09 NOTE — PROGRESS NOTES
SUBJECTIVE:   CC: Reese Sanchez is an 36 year old male who presents for preventative health visit.         Healthy Habits:     Getting at least 3 servings of Calcium per day:  Yes    Bi-annual eye exam:  NO    Dental care twice a year:  Yes    Sleep apnea or symptoms of sleep apnea:  None    Diet:  Regular (no restrictions)    Frequency of exercise:  None    Taking medications regularly:  Yes    Medication side effects:  Not applicable    PHQ-2 Total Score: 0    Additional concerns today:  No    Lots of walking at work.  No dedicated exercise.  Hasn't felt well since recovering from covid x 2.  9/2/2021 - documented.  1/2022 didn't test.  Dizzy spells.    Ocrevus - neurologist.  May space out injections/infusion.      Hypertension Follow-up    Do you check your blood pressure regularly outside of the clinic? Yes     Are you following a low salt diet? No    Are your blood pressures ever more than 140 on the top number (systolic) OR more   than 90 on the bottom number (diastolic), for example 140/90? No   Taking Lisinopril regularly.  Tolerating it.      Today's PHQ-2 Score:   PHQ-2 ( 1999 Pfizer) 3/9/2022   Q1: Little interest or pleasure in doing things 0   Q2: Feeling down, depressed or hopeless 0   PHQ-2 Score 0   PHQ-2 Total Score (12-17 Years)- Positive if 3 or more points; Administer PHQ-A if positive -   Q1: Little interest or pleasure in doing things Not at all   Q2: Feeling down, depressed or hopeless Not at all   PHQ-2 Score 0       Abuse: Current or Past(Physical, Sexual or Emotional)- No  Do you feel safe in your environment? Yes    Have you ever done Advance Care Planning? (For example, a Health Directive, POLST, or a discussion with a medical provider or your loved ones about your wishes): No, advance care planning information given to patient to review.  Patient declined advance care planning discussion at this time.    Social History     Tobacco Use     Smoking status: Former Smoker     Packs/day: 1.00      Years: 5.00     Pack years: 5.00     Start date:      Quit date:      Years since quittin.1     Smokeless tobacco: Never Used   Substance Use Topics     Alcohol use: No     If you drink alcohol do you typically have >3 drinks per day or >7 drinks per week? No    Alcohol Use 3/9/2022   Prescreen: >3 drinks/day or >7 drinks/week? No   Prescreen: >3 drinks/day or >7 drinks/week? -   AUDIT SCORE  -       Last PSA: No results found for: PSA    Reviewed orders with patient. Reviewed health maintenance and updated orders accordingly - Yes  Current Outpatient Medications   Medication     lisinopril (ZESTRIL) 20 MG tablet     ocrelizumab (OCREVUS) 300 MG/10ML SOLN injection     acetaminophen (TYLENOL) 500 MG tablet     No current facility-administered medications for this visit.       Lab work is in process  Labs reviewed in EPIC    Reviewed and updated as needed this visit by clinical staff   Tobacco  Allergies  Meds   Med Hx  Surg Hx  Fam Hx  Soc Hx        Reviewed and updated as needed this visit by Provider   Tobacco  Allergies  Meds   Med Hx  Surg Hx  Fam Hx  Soc Hx       History reviewed. No pertinent past medical history.   Past Surgical History:   Procedure Laterality Date     APPENDECTOMY       IR LUMBAR PUNCTURE  2019     wisdom teeth extraction         Review of Systems  CONSTITUTIONAL:POSITIVE  for malaise and fatigue and NEGATIVE  for anorexia, chills, fever , sweats, weight gain and weight loss  INTEGUMENTARY/SKIN: NEGATIVE for worrisome rashes, moles or lesions  EYES: NEGATIVE for vision changes or irritation  ENT: NEGATIVE for ear, mouth and throat problems  RESP: NEGATIVE for significant cough or SOB  CV: NEGATIVE for chest pain, palpitations or peripheral edema  GI: NEGATIVE for nausea, abdominal pain, heartburn, or change in bowel habits   male: negative for dysuria, hematuria, decreased urinary stream, erectile dysfunction, urethral discharge  MUSCULOSKELETAL: NEGATIVE  "for significant arthralgias or myalgia  NEURO: NEGATIVE for weakness, dizziness or paresthesias  PSYCHIATRIC: NEGATIVE for changes in mood or affect    OBJECTIVE:   /74 (BP Location: Left arm, Patient Position: Chair, Cuff Size: Adult Large)   Pulse 87   Temp 98.4  F (36.9  C) (Tympanic)   Ht 1.88 m (6' 2\")   Wt 97.1 kg (214 lb)   SpO2 96%   BMI 27.48 kg/m      Physical Exam  GENERAL: healthy, alert and no distress  EYES: Eyes grossly normal to inspection, PERRL and conjunctivae and sclerae normal  HENT: ear canals and TM's normal, nose and mouth without ulcers or lesions; nasal mucosa edematous  NECK: no adenopathy, no asymmetry, masses, or scars and thyroid normal to palpation  RESP: lungs clear to auscultation - no rales, rhonchi or wheezes  CV: regular rate and rhythm, normal S1 S2, no S3 or S4, no murmur, click or rub, no peripheral edema and peripheral pulses strong  ABDOMEN: soft, nontender, no hepatosplenomegaly, no masses and bowel sounds normal   (male): patient declines exam  MS: no gross musculoskeletal defects noted, no edema  SKIN: no suspicious lesions or rashes  NEURO: Normal strength and tone, mentation intact and speech normal  PSYCH: mentation appears normal, affect flat; limited eye contact  Patient declined changing into gown for visit.    Diagnostic Test Results:  Labs reviewed in Epic  Future labs ordered    ASSESSMENT/PLAN:       ICD-10-CM    1. Routine general medical examination at a health care facility  Z00.00    2. Essential hypertension  I10 lisinopril (ZESTRIL) 20 MG tablet     Basic metabolic panel   3. Fatigue, unspecified type  R53.83 TSH with free T4 reflex   4. Dyspnea, unspecified type  R06.00    5. Lipid screening  Z13.220 Lipid Profile (Chol, Trig, HDL, LDL calc)   6. Screening for diabetes mellitus  Z13.1 Glucose     Consider fasting labs - call to schedule when coming.    Trial of Neilmed sinus rinses twice daily - distilled water with packet.  Use OTC nasal " "steroid allergy spray once daily - take consistently - takes time to work.  Ex: Flonase, Nasonex, Rhinocort.  ENT referral if not improving over next few weeks.    Lisinopril refilled.      COUNSELING:   Reviewed preventive health counseling, as reflected in patient instructions       Regular exercise       Healthy diet/nutrition       Vision screening       Hearing screening       Immunizations    Patient declines FLU, COVID      Estimated body mass index is 27.48 kg/m  as calculated from the following:    Height as of this encounter: 1.88 m (6' 2\").    Weight as of this encounter: 97.1 kg (214 lb).     Weight management plan: Discussed healthy diet and exercise guidelines    He reports that he quit smoking about 8 years ago. He started smoking about 13 years ago. He has a 5.00 pack-year smoking history. He has never used smokeless tobacco.      Counseling Resources:  ATP IV Guidelines  Pooled Cohorts Equation Calculator  FRAX Risk Assessment  ICSI Preventive Guidelines  Dietary Guidelines for Americans, 2010  USDA's MyPlate  ASA Prophylaxis  Lung CA Screening    Nuha Barrientos MD  [facility] [place]  "

## 2022-03-09 NOTE — NURSING NOTE
"Chief Complaint   Patient presents with     Physical       Initial /74 (BP Location: Left arm, Patient Position: Chair, Cuff Size: Adult Large)   Pulse 87   Temp 98.4  F (36.9  C) (Tympanic)   Ht 1.88 m (6' 2\")   Wt 97.1 kg (214 lb)   SpO2 96%   BMI 27.48 kg/m   Estimated body mass index is 27.48 kg/m  as calculated from the following:    Height as of this encounter: 1.88 m (6' 2\").    Weight as of this encounter: 97.1 kg (214 lb).  Medication Reconciliation: complete  JANE APONTE LPN    "

## 2022-06-06 ENCOUNTER — HOSPITAL ENCOUNTER (EMERGENCY)
Facility: HOSPITAL | Age: 37
Discharge: HOME | End: 2022-06-06
Attending: NURSE PRACTITIONER | Admitting: NURSE PRACTITIONER
Payer: COMMERCIAL

## 2022-06-06 VITALS
TEMPERATURE: 97.8 F | DIASTOLIC BLOOD PRESSURE: 98 MMHG | SYSTOLIC BLOOD PRESSURE: 152 MMHG | OXYGEN SATURATION: 97 % | RESPIRATION RATE: 16 BRPM | HEART RATE: 61 BPM

## 2022-06-06 DIAGNOSIS — L03.115 CELLULITIS OF RIGHT LOWER EXTREMITY: ICD-10-CM

## 2022-06-06 PROCEDURE — G0463 HOSPITAL OUTPT CLINIC VISIT: HCPCS

## 2022-06-06 PROCEDURE — 99213 OFFICE O/P EST LOW 20 MIN: CPT | Performed by: NURSE PRACTITIONER

## 2022-06-06 RX ORDER — CEPHALEXIN 500 MG/1
500 CAPSULE ORAL 3 TIMES DAILY
Qty: 15 CAPSULE | Refills: 0 | Status: SHIPPED | OUTPATIENT
Start: 2022-06-06 | End: 2022-06-11

## 2022-06-06 ASSESSMENT — ENCOUNTER SYMPTOMS
FEVER: 0
COLOR CHANGE: 1
NAUSEA: 0
VOMITING: 0
HEADACHES: 0
CHILLS: 0
DIZZINESS: 0
LIGHT-HEADEDNESS: 0
ACTIVITY CHANGE: 1

## 2022-06-06 NOTE — DISCHARGE INSTRUCTIONS
Benadryl for itching.  Ibuprofen and/or acetaminophen for discomfort.    -Increase fluids.   -Complete all antibiotics even if feeling better.    -Taking antibiotics with food may decrease the symptoms, of an upset stomach, that can occur when taking antibiotics. Antibiotics frequently cause diarrhea. Probiotics or yogurt may help prevent or decrease these symptoms.   -Return to be reevaluated if symptoms do not improve, or worsen.    Diphenhydramine 25 to 50 mg every every 4 to 6 hours as needed for itching. (maximum dose 400 mg in 24 hours).    May apply hydrocortisone cream if it helps.   Oatmeal baths may be more helpful.   Calamine lotion my be helpful  Do not use benadryl cream when using oral benadryl    Return to ER for signs of infection, including fever, increased redness, purulent drainage from wound, and increased pain, or difficulty breathing

## 2022-06-06 NOTE — ED PROVIDER NOTES
History     Chief Complaint   Patient presents with     Insect Bite     HPI  Reese Sanchez is a 36 year old male who presents with what he believes is a bug bite/sting that he noticed yesterday morning.  Has a history of developing cellulitis from past few be stings.  No OTC medications have been taken or home interventions applied.  Quit smoking  denies fevers, chills, nausea, vomiting, headaches, dizziness, and lightheadedness.    Allergies:  No Known Allergies    Problem List:    Patient Active Problem List    Diagnosis Date Noted     Hyperlipidemia, unspecified hyperlipidemia type 2021     Priority: Medium     Essential hypertension 2020     Priority: Medium     MS (multiple sclerosis) (H) 2019     Priority: Medium        Past Medical History:    History reviewed. No pertinent past medical history.    Past Surgical History:    Past Surgical History:   Procedure Laterality Date     APPENDECTOMY       IR LUMBAR PUNCTURE  2019     wisdom teeth extraction         Family History:    Family History   Problem Relation Age of Onset     Arrhythmia Maternal Grandmother      Heart Disease Maternal Grandfather      Heart Disease Paternal Grandfather        Social History:  Marital Status:   [2]  Social History     Tobacco Use     Smoking status: Former Smoker     Packs/day: 1.00     Years: 5.00     Pack years: 5.00     Start date:      Quit date:      Years since quittin.4     Smokeless tobacco: Never Used   Substance Use Topics     Alcohol use: No     Drug use: No        Medications:    acetaminophen (TYLENOL) 500 MG tablet  cephALEXin (KEFLEX) 500 MG capsule  lisinopril (ZESTRIL) 20 MG tablet  ocrelizumab (OCREVUS) 300 MG/10ML SOLN injection          Review of Systems   Constitutional: Positive for activity change. Negative for chills and fever.   Gastrointestinal: Negative for nausea and vomiting.   Skin: Positive for color change.        Itching red   Neurological: Negative  for dizziness, light-headedness and headaches.       Physical Exam   BP: 152/98  Pulse: 61  Temp: 97.8  F (36.6  C)  Resp: 16  SpO2: 97 %      Physical Exam  Vitals and nursing note reviewed.   Constitutional:       General: He is in acute distress (Mild).      Appearance: He is normal weight.   Cardiovascular:      Rate and Rhythm: Normal rate.   Pulmonary:      Effort: Pulmonary effort is normal.   Musculoskeletal:         General: Tenderness present. No swelling.        Legs:    Skin:     General: Skin is warm and dry.      Findings: Erythema present. No bruising.   Neurological:      Mental Status: He is alert and oriented to person, place, and time.   Psychiatric:         Behavior: Behavior normal.         ED Course                 Procedures           No results found for this or any previous visit (from the past 24 hour(s)).    Medications - No data to display    Assessments & Plan (with Medical Decision Making)     I have reviewed the nursing notes.    I have reviewed the findings, diagnosis, plan and need for follow up with the patient.  (L03.115) Cellulitis of right lower extremity  Comment: 36 year old male who presents with what he believes is a bug bite/sting that he noticed yesterday morning.  Has a history of developing cellulitis from past few be stings.  No OTC medications have been taken or home interventions applied.  Quit smoking 2014 denies fevers, chills, nausea, vomiting, headaches, dizziness, and lightheadedness.    MDM: 1 cm region very dark erythema (bug bite) with. Erythema extending inferiorly approximately 9 cm length by 5 cm width    Plan: Cephalexin 3 times daily for 5 days.  Education provided and/or discussed for this/these medication and cellulitis.  Benadryl for itching.  Ibuprofen and/or acetaminophen for discomfort.    -Increase fluids.   -Complete all antibiotics even if feeling better.    -Taking antibiotics with food may decrease the symptoms, of an upset stomach, that can  occur when taking antibiotics. Antibiotics frequently cause diarrhea. Probiotics or yogurt may help prevent or decrease these symptoms.   -Return to be reevaluated if symptoms do not improve, or worsen.    Diphenhydramine 25 to 50 mg every every 4 to 6 hours as needed for itching. (maximum dose 400 mg in 24 hours).    May apply hydrocortisone cream if it helps.   Oatmeal baths may be more helpful.   Calamine lotion my be helpful  Do not use benadryl cream when using oral benadryl    Return to ER for signs of infection, including fever, increased redness, purulent drainage from wound, and increased pain, or difficulty breathing  These discharge instructions and medications were reviewed with him and understanding verbalized.    This document was prepared using a combination of typing and voice generated software.  While every attempt was made for accuracy, spelling and grammatical errors may exist.    Discharge Medication List as of 6/6/2022 10:20 AM      START taking these medications    Details   cephALEXin (KEFLEX) 500 MG capsule Take 1 capsule (500 mg) by mouth 3 times daily for 5 days, Disp-15 capsule, R-0, E-Prescribe             Final diagnoses:   Cellulitis of right lower extremity       6/6/2022   [facility]       Eliza Nash, CNP  06/06/22 1210

## 2022-06-06 NOTE — ED TRIAGE NOTES
Pt presents with c/o a bug bite on the inner lower right leg. Pt noticed bite on Sunday morning so probably happened Saturday morning. Pt states its red, warm to touch, red spot is getting bigger. Concerns about cellulitis. Pt has been cleaning it but no otc meds or creams.

## 2022-06-06 NOTE — ED TRIAGE NOTES
Patient presents with complaints of a bug bite on his right leg that he noticed Sunday morning, worried about cellulitis.

## 2022-08-18 ENCOUNTER — MEDICAL CORRESPONDENCE (OUTPATIENT)
Dept: MRI IMAGING | Facility: HOSPITAL | Age: 37
End: 2022-08-18

## 2022-12-26 ENCOUNTER — HEALTH MAINTENANCE LETTER (OUTPATIENT)
Age: 37
End: 2022-12-26

## 2023-01-01 NOTE — PROGRESS NOTES
Received signed orders for IV methylsprednisolone 1 gram IV daily x 3 days, for a dx of MS. Therapy plan in, pharmacy updated and [facility] notified to call to schedule patient. Orders sent to Dr. Hobbs for co-signature.  
I will START or STAY ON the medications listed below when I get home from the hospital:  None

## 2023-02-02 ENCOUNTER — HOSPITAL ENCOUNTER (OUTPATIENT)
Dept: MRI IMAGING | Facility: HOSPITAL | Age: 38
Discharge: HOME | End: 2023-02-02
Attending: PSYCHIATRY & NEUROLOGY
Payer: COMMERCIAL

## 2023-02-02 DIAGNOSIS — G35 MS (MULTIPLE SCLEROSIS) (H): ICD-10-CM

## 2023-02-02 PROCEDURE — A9585 GADOBUTROL INJECTION: HCPCS | Performed by: RADIOLOGY

## 2023-02-02 PROCEDURE — 255N000002 HC RX 255 OP 636: Performed by: RADIOLOGY

## 2023-02-02 PROCEDURE — 70553 MRI BRAIN STEM W/O & W/DYE: CPT

## 2023-02-02 PROCEDURE — 72156 MRI NECK SPINE W/O & W/DYE: CPT

## 2023-02-02 RX ORDER — GADOBUTROL 604.72 MG/ML
10 INJECTION INTRAVENOUS ONCE
Status: COMPLETED | OUTPATIENT
Start: 2023-02-02 | End: 2023-02-02

## 2023-02-02 RX ADMIN — GADOBUTROL 10 ML: 604.72 INJECTION INTRAVENOUS at 13:30

## 2023-02-28 ENCOUNTER — E-VISIT (OUTPATIENT)
Dept: URGENT CARE | Facility: CLINIC | Age: 38
End: 2023-02-28
Payer: COMMERCIAL

## 2023-02-28 DIAGNOSIS — H10.33 ACUTE BACTERIAL CONJUNCTIVITIS OF BOTH EYES: Primary | ICD-10-CM

## 2023-02-28 PROCEDURE — 99421 OL DIG E/M SVC 5-10 MIN: CPT | Performed by: EMERGENCY MEDICINE

## 2023-02-28 RX ORDER — POLYMYXIN B SULFATE AND TRIMETHOPRIM 1; 10000 MG/ML; [USP'U]/ML
SOLUTION OPHTHALMIC
Qty: 10 ML | Refills: 0 | Status: SHIPPED | OUTPATIENT
Start: 2023-02-28 | End: 2023-03-07

## 2023-03-01 ENCOUNTER — MYC MEDICAL ADVICE (OUTPATIENT)
Dept: FAMILY MEDICINE | Facility: OTHER | Age: 38
End: 2023-03-01

## 2023-03-01 DIAGNOSIS — H57.10 PAIN IN EYE, UNSPECIFIED LATERALITY: Primary | ICD-10-CM

## 2023-04-22 ENCOUNTER — HEALTH MAINTENANCE LETTER (OUTPATIENT)
Age: 38
End: 2023-04-22

## 2023-07-17 PROBLEM — R93.89 ABNORMAL MRI: Status: ACTIVE | Noted: 2022-02-09

## 2023-07-17 PROBLEM — H53.8 BLURRING OF VISUAL IMAGE: Status: ACTIVE | Noted: 2022-02-09

## 2023-07-17 PROBLEM — M62.81 MUSCLE WEAKNESS: Status: ACTIVE | Noted: 2022-02-09

## 2023-07-17 PROBLEM — Z86.2 HX OF AUTOIMMUNE DISORDER: Status: ACTIVE | Noted: 2022-02-09

## 2023-07-17 PROBLEM — D80.1 HYPOGAMMAGLOBULINEMIA (H): Status: ACTIVE | Noted: 2022-02-15

## 2023-07-17 PROBLEM — M54.2 CERVICALGIA: Status: ACTIVE | Noted: 2022-02-09

## 2023-07-17 PROBLEM — F22: Status: ACTIVE | Noted: 2022-02-09

## 2023-07-17 PROBLEM — G31.84 MILD COGNITIVE IMPAIRMENT: Status: ACTIVE | Noted: 2022-08-18

## 2023-07-17 PROBLEM — H46.9 OPTIC NEUROPATHY: Status: ACTIVE | Noted: 2022-02-09

## 2023-07-17 PROBLEM — R68.89 HEAT SENSITIVITY: Status: ACTIVE | Noted: 2022-02-09

## 2023-07-17 PROBLEM — H53.40 VISUAL FIELD DEFECT: Status: ACTIVE | Noted: 2022-02-09

## 2023-07-17 PROBLEM — R20.2 PARESTHESIAS: Status: ACTIVE | Noted: 2022-02-09

## 2023-07-17 PROBLEM — G58.9 PINCHED NERVE IN NECK: Status: ACTIVE | Noted: 2020-09-01

## 2023-07-17 RX ORDER — TOBRAMYCIN AND DEXAMETHASONE 3; 1 MG/ML; MG/ML
SUSPENSION/ DROPS OPHTHALMIC
COMMUNITY
Start: 2023-03-02 | End: 2023-07-20

## 2023-07-17 RX ORDER — GABAPENTIN 300 MG/1
CAPSULE ORAL
COMMUNITY
Start: 2023-04-12

## 2023-07-17 NOTE — PROGRESS NOTES
Assessment & Plan     Essential hypertension  At goal.  Possible side effect of cough with Lisinopril.  Desires to get off BP medication.  Reduce to 10 mg daily. Update me with home readings 2 weeks.  If still at goal, can try off.  If needing to , would consider Losartan vs Norvasc.  - Comprehensive metabolic panel (BMP + Alb, Alk Phos, ALT, AST, Total. Bili, TP); Future  - lisinopril (ZESTRIL) 10 MG tablet; Take 2 tablets (20 mg) by mouth daily    Cough, unspecified type  Related to Lisinopril?  Not the typical dry cough.  More related to eating.  Discussed GERD/LPR.    Dietary modification.  Consider 2 week trial of Prilosec.    Hyperlipidemia, unspecified hyperlipidemia type  Future fasting lab orders scheduled prior to leaving today.  - Lipid Profile (Chol, Trig, HDL, LDL calc); Future  - Comprehensive metabolic panel (BMP + Alb, Alk Phos, ALT, AST, Total. Bili, TP); Future  - TSH with free T4 reflex; Future    MS (multiple sclerosis) (H)  Managed by neurology.  Will discuss C5/6 disc with them as well - would suggest focal ANUM - may be able to get off the Neurontin then.         See Patient Instructions    Return for annual physical.    Nuha Barrientos MD  [facility] JOSE Leigh is a 38 year old, presenting for the following health issues:  Lipids and Hypertension      HPI     Hep B - declined  Physical - declined    Follows with neurology - MS.  Ocrevus every 9 months.  Tolerating ok.    Hyperlipidemia Follow-Up    Are you regularly taking any medication or supplement to lower your cholesterol?   No    Are you having muscle aches or other side effects that you think could be caused by your cholesterol lowering medication?  No    Hypertension Follow-up    Do you check your blood pressure regularly outside of the clinic? No     Are you following a low salt diet? No    Are your blood pressures ever more than 140 on the top number (systolic) OR more   than 90 on  the bottom number (diastolic), for example 140/90? Yes   Home readings - doesn't check anymore.  Rare snoring. No apnea.  Would like to be off medication if possible.  Morning and after meals - coughing more- clearing throat.  Does not feel like reflux.  Feels short of breath with it at times.  Non exertional.    Gabapentin 600 mg AM.  Helps with nerve pain - neck/arm.  Started by neurology.  C5/6 - left disc on MRI 2/2023.  PT course - plus/minus.      Review of Systems   Constitutional, HEENT, cardiovascular, pulmonary, gi and gu systems are negative, except as otherwise noted.      Objective    /78 (BP Location: Right arm, Patient Position: Sitting, Cuff Size: Adult Regular)   Pulse 73   Temp 97.9  F (36.6  C) (Tympanic)   Resp 18   Wt 100.9 kg (222 lb 6.4 oz)   SpO2 94%   BMI 28.55 kg/m    Body mass index is 28.55 kg/m .  Physical Exam   GENERAL: healthy, alert and no distress  EYES: Eyes grossly normal to inspection, PERRL and conjunctivae and sclerae normal  HENT: ear canals and TM's normal, nose and mouth without ulcers or lesions  NECK: no adenopathy, no asymmetry, masses, or scars and thyroid normal to palpation  RESP: lungs clear to auscultation - no rales, rhonchi or wheezes  CV: regular rate and rhythm, normal S1 S2, no S3 or S4, no murmur, click or rub, no peripheral edema and peripheral pulses strong  ABDOMEN: soft, nontender, no hepatosplenomegaly, no masses and bowel sounds normal  MS: no gross musculoskeletal defects noted, no edema  NEURO: Normal strength and tone, mentation intact and speech normal  PSYCH: mentation appears normal, affect normal/bright    Future fasting labs ordered.

## 2023-07-20 ENCOUNTER — OFFICE VISIT (OUTPATIENT)
Dept: FAMILY MEDICINE | Facility: OTHER | Age: 38
End: 2023-07-20
Attending: FAMILY MEDICINE
Payer: COMMERCIAL

## 2023-07-20 VITALS
RESPIRATION RATE: 18 BRPM | TEMPERATURE: 97.9 F | SYSTOLIC BLOOD PRESSURE: 124 MMHG | WEIGHT: 222.4 LBS | HEART RATE: 73 BPM | OXYGEN SATURATION: 94 % | BODY MASS INDEX: 28.55 KG/M2 | DIASTOLIC BLOOD PRESSURE: 78 MMHG

## 2023-07-20 DIAGNOSIS — R05.9 COUGH, UNSPECIFIED TYPE: ICD-10-CM

## 2023-07-20 DIAGNOSIS — E78.5 HYPERLIPIDEMIA, UNSPECIFIED HYPERLIPIDEMIA TYPE: ICD-10-CM

## 2023-07-20 DIAGNOSIS — I10 ESSENTIAL HYPERTENSION: Primary | ICD-10-CM

## 2023-07-20 DIAGNOSIS — G35 MS (MULTIPLE SCLEROSIS) (H): ICD-10-CM

## 2023-07-20 PROCEDURE — 99214 OFFICE O/P EST MOD 30 MIN: CPT | Performed by: FAMILY MEDICINE

## 2023-07-20 RX ORDER — LISINOPRIL 10 MG/1
20 TABLET ORAL DAILY
Qty: 90 TABLET | Refills: 1 | Status: SHIPPED | OUTPATIENT
Start: 2023-07-20 | End: 2023-11-02

## 2023-07-20 ASSESSMENT — PAIN SCALES - GENERAL: PAINLEVEL: MILD PAIN (3)

## 2023-07-20 NOTE — PATIENT INSTRUCTIONS
Reduce Lisinopril to 10 mg daily.  Monitor BP at home once per day (variable times) - sit, legs uncrossed, relax.  Goal <130/80.  Update me via mychart in 2 weeks, sooner if concerns.  If BP good, will then try off.    Can see if cough improves with medication changes.    Consider C5/6 injection - discuss with neurology.    Fasting labs scheduled.

## 2023-07-25 ENCOUNTER — LAB (OUTPATIENT)
Dept: LAB | Facility: OTHER | Age: 38
End: 2023-07-25
Payer: COMMERCIAL

## 2023-07-25 DIAGNOSIS — I10 ESSENTIAL HYPERTENSION: ICD-10-CM

## 2023-07-25 DIAGNOSIS — E78.5 HYPERLIPIDEMIA, UNSPECIFIED HYPERLIPIDEMIA TYPE: ICD-10-CM

## 2023-07-25 LAB
ALBUMIN SERPL BCG-MCNC: 4.3 G/DL (ref 3.5–5.2)
ALP SERPL-CCNC: 92 U/L (ref 40–129)
ALT SERPL W P-5'-P-CCNC: 24 U/L (ref 0–70)
ANION GAP SERPL CALCULATED.3IONS-SCNC: 9 MMOL/L (ref 7–15)
AST SERPL W P-5'-P-CCNC: 17 U/L (ref 0–45)
BILIRUB SERPL-MCNC: 0.2 MG/DL
BUN SERPL-MCNC: 11.1 MG/DL (ref 6–20)
CALCIUM SERPL-MCNC: 9.1 MG/DL (ref 8.6–10)
CHLORIDE SERPL-SCNC: 106 MMOL/L (ref 98–107)
CHOLEST SERPL-MCNC: 246 MG/DL
CREAT SERPL-MCNC: 0.93 MG/DL (ref 0.67–1.17)
DEPRECATED HCO3 PLAS-SCNC: 26 MMOL/L (ref 22–29)
GFR SERPL CREATININE-BSD FRML MDRD: >90 ML/MIN/1.73M2
GLUCOSE SERPL-MCNC: 105 MG/DL (ref 70–99)
HDLC SERPL-MCNC: 49 MG/DL
LDLC SERPL CALC-MCNC: 158 MG/DL
NONHDLC SERPL-MCNC: 197 MG/DL
POTASSIUM SERPL-SCNC: 4.2 MMOL/L (ref 3.4–5.3)
PROT SERPL-MCNC: 6.3 G/DL (ref 6.4–8.3)
SODIUM SERPL-SCNC: 141 MMOL/L (ref 136–145)
TRIGL SERPL-MCNC: 196 MG/DL
TSH SERPL DL<=0.005 MIU/L-ACNC: 1.98 UIU/ML (ref 0.3–4.2)

## 2023-07-25 PROCEDURE — 80061 LIPID PANEL: CPT

## 2023-07-25 PROCEDURE — 84443 ASSAY THYROID STIM HORMONE: CPT

## 2023-07-25 PROCEDURE — 80053 COMPREHEN METABOLIC PANEL: CPT

## 2023-07-25 PROCEDURE — 36415 COLL VENOUS BLD VENIPUNCTURE: CPT

## 2023-08-20 ENCOUNTER — HOSPITAL ENCOUNTER (EMERGENCY)
Facility: HOSPITAL | Age: 38
Discharge: HOME | End: 2023-08-20
Attending: PHYSICIAN ASSISTANT | Admitting: PHYSICIAN ASSISTANT
Payer: COMMERCIAL

## 2023-08-20 VITALS
BODY MASS INDEX: 29.48 KG/M2 | TEMPERATURE: 98.3 F | WEIGHT: 222.44 LBS | DIASTOLIC BLOOD PRESSURE: 84 MMHG | SYSTOLIC BLOOD PRESSURE: 130 MMHG | HEIGHT: 73 IN | RESPIRATION RATE: 16 BRPM | HEART RATE: 64 BPM | OXYGEN SATURATION: 98 %

## 2023-08-20 DIAGNOSIS — M25.571 ACUTE RIGHT ANKLE PAIN: ICD-10-CM

## 2023-08-20 PROCEDURE — 87070 CULTURE OTHR SPECIMN AEROBIC: CPT | Performed by: PHYSICIAN ASSISTANT

## 2023-08-20 PROCEDURE — 999N000104 HC STATISTIC NO CHARGE

## 2023-08-20 PROCEDURE — 20605 DRAIN/INJ JOINT/BURSA W/O US: CPT

## 2023-08-20 PROCEDURE — 20605 DRAIN/INJ JOINT/BURSA W/O US: CPT | Performed by: PHYSICIAN ASSISTANT

## 2023-08-20 RX ORDER — AMOXICILLIN 875 MG
875 TABLET ORAL 2 TIMES DAILY
Qty: 14 TABLET | Refills: 0 | Status: SHIPPED | OUTPATIENT
Start: 2023-08-20 | End: 2023-08-27

## 2023-08-20 ASSESSMENT — ENCOUNTER SYMPTOMS
ACTIVITY CHANGE: 1
FATIGUE: 0
COLOR CHANGE: 1
ABDOMINAL PAIN: 0
EYE REDNESS: 0
COUGH: 0
ARTHRALGIAS: 1
BACK PAIN: 0
JOINT SWELLING: 1
WOUND: 0
HEADACHES: 0
SINUS PRESSURE: 0
DYSURIA: 0
FEVER: 0

## 2023-08-20 NOTE — Clinical Note
Reese Sanchez was seen and treated in our emergency department on 8/20/2023.  He may return to work on 08/24/2023.       If you have any questions or concerns, please don't hesitate to call.      Evelyn Cornelius PA-C

## 2023-08-20 NOTE — ED PROVIDER NOTES
History     Chief Complaint   Patient presents with    Ankle Pain     HPI  Reese Sanchez is a 38 year old male who presents for evaluation of right ankle pain. This started on Friday and has continued to worsen. The anterior ankle is red, warm, swollen and painful. Localized around the joint. Range of motion, especially flexion, is reduced due to pain and swelling. Hx of gout in the great toe many years ago. Denies fever, chills, ascending redness. Denies trauma or recent overuse of the joint.    Allergies:  No Known Allergies    Problem List:    Patient Active Problem List    Diagnosis Date Noted    Mild cognitive impairment 08/18/2022     Priority: Medium     Formatting of this note might be different from the original.  Some issues with semantic retrieval. Will continue to monitor.   Dx Update from IMO     Last Assessment & Plan:   Formatting of this note might be different from the original.  Discussed sleep hygiene and stress management for cognitive improvement. Will continue to monitor for changes.      Hypogammaglobulinemia (H) 02/15/2022     Priority: Medium     Last Assessment & Plan:   Formatting of this note might be different from the original.  Low levels of IgG 491 1/7/22 on standard reference range. Will refer patient to Dr. Swan, to address Ocrevus and implications of antibody levels.     Plan   - refer to [place] [facility], Dr. Swan      Abnormal MRI 02/09/2022     Priority: Medium    Blurring of visual image 02/09/2022     Priority: Medium     Formatting of this note might be different from the original.  Last assessed 8/4/20    Symptoms described may be due to refractive error, dry eyes. With no pain and episodic nature, unlikely due to MS. Patient advised to follow up with ophthalmology. In the meantime patient advised to obtain artificial tears and use regularly.    Plan:   - F/u with ophthalmologist  - Obtain artificial tears      Cervicalgia 02/09/2022     Priority: Medium      Formatting of this note might be different from the original.  Last assessed 7/14/21    L cervicalgia with radicular pain, numbness, and tingling to L arm. Onset since 3/2021, now daily and constant. Pt has not been medicating. PT helped some. Associated with multilevel cord bulging and impingement between C5-C6, C6-C7.    Current treatment: none  Past treatment; PT  PLAN  - FU with PCP  - see orthopedist if pain worsens.     Last Assessment & Plan:   Formatting of this note might be different from the original.  Ongoing pain in the neck. Surgery is a last resort option. We can trial gabapentin. Discussed the side effects that can include some sedation and swelling, but this tends to occur at higher doses.     Plan:   - Trial gabapentin 300mg BID-TID titrating up to 3 tabs TID      Heat sensitivity 02/09/2022     Priority: Medium     Formatting of this note might be different from the original.  Last assessed 7/14/21    Pt reports heat is affecting him more this year. Discussed causes for heat sensitivity in MS, typical SS, cooling techniques    PLAN  - contact [employer] for a cooling west  - will provide a letter to employer if work accommodation needed for controlled/cooled environment      Hx of autoimmune disorder 02/09/2022     Priority: Medium     Formatting of this note might be different from the original.  Exacerbation History and Treatment Response:  - 1/2019: Abrupt onset of RUE weakness and numbness/tingling, with subsequent development of RLE weakness as well. No change in speech, no facial numbness or droop. Followed-up with his PCP, MRI B obtained, showing enhancing lesions in the L parietal convexity with several other WM lesions in bilat. hemispheres. 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 MRI C showed subtle non-enhancing abnormality. Treated with 1 g IV methylprednisolone x 3 days, with some improvement. Labwork performed, JOHNATHAN screen, ANCA, and NMO negative.   -  2/2019: LP performed, no evidence of oligoclonal bands per record view, however LP results not on file.    - 1/2020: Seen in f/u, patient reported intermittent erectile dysfunction and more noticeable fatigue. However, his R sided strength had returned to baseline.   - 2/2020: MRI B/C/T repeated, MRI B showed reduction in the size of his L parietal lobe lesion. MRI C with notable non-enhancing lesion at C4 level, and MRI T with lesion at T1-2 levels. MS dx confirmed, patient advised to begin a DMT.      Muscle weakness 02/09/2022     Priority: Medium     Formatting of this note might be different from the original.  Last assessed 6/10/20    Hx of R sided weakness in 1/2019, sxs resolved completely.     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  OCT  6/30/21: Ocrevus baseline OCTs obtained. Some measurement error noted. 107 microns OU.   2/15/22: stable OU    RNFL trend     Macula  Contrast Sensitivity      Last Assessment & Plan:   Formatting of this note might be different from the original.  OCT  2/15/22:  microns.  microns.      Paresthesias 02/09/2022     Priority: Medium     Formatting of this note might be different from the original.  R sided paresthesias with initial onset of sxs in 1/2019, sxs have since resolved.     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  Episode of mid-section banding pain in 2/2020 - 3/2020. May be related to his enhancing T spine lesions seen on MRI T from 2/3/2020.     Plan:   - Monitor for change      Visual field defect 02/09/2022     Priority: Medium     Formatting of this note might be different from the original.  HVF  6/30/21: Baseline obtained      Hyperlipidemia, unspecified  "hyperlipidemia type 2021     Priority: Medium    Essential hypertension 2020     Priority: Medium    Pinched nerve in neck 2020     Priority: Medium    MS (multiple sclerosis) (H) 2019     Priority: Medium        Past Medical History:    No past medical history on file.    Past Surgical History:    Past Surgical History:   Procedure Laterality Date    APPENDECTOMY      IR LUMBAR PUNCTURE  2019    wisdom teeth extraction         Family History:    Family History   Problem Relation Age of Onset    Arrhythmia Maternal Grandmother     Heart Disease Maternal Grandfather     Heart Disease Paternal Grandfather        Social History:  Marital Status:   [2]  Social History     Tobacco Use    Smoking status: Former     Packs/day: 1.00     Years: 5.00     Pack years: 5.00     Types: Cigarettes     Start date:      Quit date:      Years since quittin.6    Smokeless tobacco: Never   Substance Use Topics    Alcohol use: No    Drug use: No        Medications:    amoxicillin (AMOXIL) 875 MG tablet  acetaminophen (TYLENOL) 500 MG tablet  gabapentin (NEURONTIN) 300 MG capsule  lisinopril (ZESTRIL) 10 MG tablet  ocrelizumab (OCREVUS) 300 MG/10ML SOLN injection          Review of Systems   Constitutional:  Positive for activity change. Negative for fatigue and fever.   HENT:  Negative for congestion and sinus pressure.    Eyes:  Negative for redness.   Respiratory:  Negative for cough.    Cardiovascular:  Negative for chest pain.   Gastrointestinal:  Negative for abdominal pain.   Genitourinary:  Negative for dysuria.   Musculoskeletal:  Positive for arthralgias and joint swelling. Negative for back pain.   Skin:  Positive for color change. Negative for rash and wound.   Neurological:  Negative for headaches.       Physical Exam   BP: 130/84  Pulse: 64  Temp: 98.3  F (36.8  C)  Resp: 16  Height: 185.4 cm (6' 1\")  Weight: 100.9 kg (222 lb 7.1 oz)  SpO2: 98 %      Physical Exam  Vitals and " nursing note reviewed.   Constitutional:       Appearance: Normal appearance. He is normal weight.   HENT:      Head: Normocephalic and atraumatic.      Right Ear: External ear normal.      Left Ear: External ear normal.      Nose: Nose normal.      Mouth/Throat:      Mouth: Mucous membranes are moist.   Eyes:      Conjunctiva/sclera: Conjunctivae normal.   Cardiovascular:      Pulses:           Dorsalis pedis pulses are 2+ on the right side.        Posterior tibial pulses are 1+ on the right side.   Pulmonary:      Effort: Pulmonary effort is normal.   Musculoskeletal:      Cervical back: Normal range of motion.      Right foot: Decreased range of motion (flexion and extension cause pain, ROM is stiff). No deformity.        Feet:    Feet:      Right foot:      Skin integrity: Warmth (to anterior ankle and foot) present. No skin breakdown or dry skin.   Skin:     General: Skin is warm.      Capillary Refill: Capillary refill takes less than 2 seconds.   Neurological:      General: No focal deficit present.      Mental Status: He is alert and oriented to person, place, and time.   Psychiatric:         Mood and Affect: Mood normal.         Behavior: Behavior normal.         ED Course           Arthrocentesis    Date/Time: 8/20/2023 6:39 PM    Performed by: Evelyn Cornelius PA-C  Authorized by: Evelyn Cornelius PA-C    Risks, benefits and alternatives discussed.      LOCATION:   Location:  Ankle  ANESTHESIA (see MAR for exact dosages):   Anesthesia method:  None  PROCEDURE DETAILS:     Needle gauge:  18 G    Ultrasound guidance: no      Approach:  Lateral    Aspirate amount:  2 mm    Aspirate characteristics:  Blood-tinged    Steroid injected: no      Specimen collected: Minimal, most likely just blood.      Dressing: adhesive bandage      PROCEDURE    Patient Tolerance:  Patient tolerated the procedure well with no immediate complications   Not enough fluid to be sent for cell count or crystals ID. Bacterial culture  ordered.          No results found for this or any previous visit (from the past 24 hour(s)).    Medications - No data to display    Assessments & Plan (with Medical Decision Making)     I have reviewed the nursing notes.    I have reviewed the findings, diagnosis, plan and need for follow up with the patient.  Acute right ankle pain, redness and stiffness  We are not sure today if you have Gout versus an infection (cellulitis). Unfortunately the sample fluid from the joint aspiration was fairly small and we were only able to send it for bacterial culture which would rule out a possible deeper joint infection, but not confirm or rule out gout.     I will prescribe an antibiotic for possible cellulitis to have in the meantime. Take full course, 7 days of amoxicillin.     Ice, rest, elevate limb above heart level. Ibuprofen/tylenol for pain and stiffness. Work note provided.     Follow up with primary care in 7-10 days for re-evaluation.   If redness worsens and continues to spread, return to ED for re-evaluation.     Medical Decision Making  The patient's presentation was of low complexity (an acute and uncomplicated illness or injury).    The patient's evaluation involved:  ordering and/or review of 1 test(s) in this encounter (see separate area of note for details)    The patient's management necessitated moderate risk (prescription drug management including medications given in the ED).        Discharge Medication List as of 8/20/2023  6:13 PM        START taking these medications    Details   amoxicillin (AMOXIL) 875 MG tablet Take 1 tablet (875 mg) by mouth 2 times daily for 7 days, Disp-14 tablet, R-0, E-Prescribe             Final diagnoses:   Acute right ankle pain       8/20/2023   [facility]

## 2023-08-20 NOTE — DISCHARGE INSTRUCTIONS
We are not sure today if you have Gout versus an infection (cellulitis). Hopefully the sample we collected from your ankle will be able to tell us more information.     Take full course of antibiotic - 7 days   If redness worsens and continues to spread, return to ED for re-evaluation.

## 2023-08-20 NOTE — ED TRIAGE NOTES
Pt presents with swollen, stiff right ankle onset Friday, denies injury, ambulatory into triage. Denies fevers.   Reports history of cellulitis.

## 2023-08-20 NOTE — ED TRIAGE NOTES
Pt presents with c/o right ankle pain, swelling, and stiffness. Onset was Friday. Denies injury or trauma. CMS intact. Limited ROM from pain. Pt able to ambulate to room. Endorses a hx of cellulitis. Pt has been taking tylenol when needed but no otc meds today. Pt has iced and elevated a little bit.

## 2023-08-25 ENCOUNTER — MYC MEDICAL ADVICE (OUTPATIENT)
Dept: FAMILY MEDICINE | Facility: OTHER | Age: 38
End: 2023-08-25

## 2023-08-25 NOTE — TELEPHONE ENCOUNTER
Pt called no answer.     Pt seen in the ER on 08/20/2023 for right ankle pain.      Arthrocentesis completed in ER-Aerobic bacteria culture results: Preliminary result: no growth after 4 days.  Pt was started on Amoxicillin     Per ER recommendations:   Follow up with Nuha Barrientos MD in 1 week  (around 8/27/2023)    PCP has no openings until September 7th a same day at 1:30 PM.      Please see MCM and advise.

## 2023-08-25 NOTE — TELEPHONE ENCOUNTER
Please see MCM and advise.  ER recommendation for a follow up with PCP in one week.  PCP has no appointment available until September 7th a same day at 1:30 PM.  Does pt need to be seen.  If so:    Overbook?  Or   Covering provider?     Emergency Department and Urgent Care Follow-up    Reason for ER/UC visit: ankle pain  Date seen: 08/20/2023    New or Worsening symptoms:  No, redness, swelling, and pain are much better.       Prescription Received/Picked up from Pharmacy?: Amoxicillian and was picked up from the pharmacy    Medications started? Yes with a completion date for Sunday  Any questions or issues regarding your prescription?: Pt is wondering if he will need any additional medication for gout.     Follow-up Results or Labs that are pending: None    Questions or concerns?: Wondering if he will need any additional medication or treatments going forward.      ER Recommends Follow-up by: In one week around 08/27/2023    RN Recommendations: Will need a follow up  Appointment scheduled: No, sent to PCP for recommendations.     If you start feeling worse, or have any further questions, please feel free to contact Nurse Triage at [phone].  If needing immediate medical attention at any time please call 911/Go to the ER.

## 2023-08-27 LAB
BACTERIA ASPIRATE CULT: NO GROWTH
GRAM STAIN RESULT: NORMAL
GRAM STAIN RESULT: NORMAL

## 2023-09-01 NOTE — PROGRESS NOTES
"  Assessment & Plan     Pain in joint involving ankle and foot, right  Isolated joint - isolated event - resolved.  Synovial fluid culture negative.  Labs today - evaluate for lyme, gout, RA, etc.  Discussed hydration, clean diet, and triggers in diet that contribute to gout.  - Uric acid; Future  - Lyme Disease Total Abs Bld with Reflex to Confirm CLIA; Future  - CRP, inflammation; Future  - CBC with platelets and differential; Future  - Rheumatoid factor; Future  - Cyclic Citrullinated Peptide Antibody IgG; Future  - Uric acid  - Lyme Disease Total Abs Bld with Reflex to Confirm CLIA  - CRP, inflammation  - CBC with platelets and differential  - Rheumatoid factor  - Cyclic Citrullinated Peptide Antibody IgG      20 minutes spent by me on the date of the encounter doing chart review, history and exam, documentation and further activities per the note       BMI:   Estimated body mass index is 29 kg/m  as calculated from the following:    Height as of 8/20/23: 1.854 m (6' 1\").    Weight as of this encounter: 99.7 kg (219 lb 12.8 oz).   Weight management plan: Discussed healthy diet and exercise guidelines    See Patient Instructions    Return if symptoms worsen or fail to improve.    Nuha Barrientos MD  [facility] - JOSE Leigh is a 38 year old, presenting for the following health issues:  RECHECK    HPI     ED/UC Followup:    Facility:  [facility] [place]  Date of visit: 8/20/2023  Reason for visit: Acute right ankle pain - redness, warmth, swelling.    Treated empirically with Amoxicillin - completed course.    Arthrocentesis performed - culture negative.    Current Status: No pain. Patient says it has improved - resolved over 2 weeks.    No dietary changes.  No new med or supplement.  Family history - negative.  Remote 1 episode of gout in toe.  No known tick bites.    Review of Systems   Constitutional, HEENT, cardiovascular, pulmonary, gi and gu systems are negative, except as otherwise " noted.      Objective    /82 (BP Location: Right arm, Patient Position: Sitting, Cuff Size: Adult Large)   Pulse 81   Temp 98.6  F (37  C) (Tympanic)   Wt 99.7 kg (219 lb 12.8 oz)   SpO2 96%   BMI 29.00 kg/m    Body mass index is 29 kg/m .  Physical Exam   GENERAL: healthy, alert and no distress  NECK: no adenopathy, no asymmetry, masses, or scars and thyroid normal to palpation  RESP: lungs clear to auscultation - no rales, rhonchi or wheezes  CV: regular rate and rhythm, normal S1 S2, no S3 or S4, no murmur, click or rub, no peripheral edema and peripheral pulses strong  MS: no gross musculoskeletal defects noted, no edema  SKIN: no suspicious lesions or rashes  PSYCH: mentation appears normal, affect normal/bright    Labs pending

## 2023-09-06 ENCOUNTER — MYC MEDICAL ADVICE (OUTPATIENT)
Dept: FAMILY MEDICINE | Facility: OTHER | Age: 38
End: 2023-09-06

## 2023-09-07 ENCOUNTER — OFFICE VISIT (OUTPATIENT)
Dept: FAMILY MEDICINE | Facility: OTHER | Age: 38
End: 2023-09-07
Attending: FAMILY MEDICINE
Payer: COMMERCIAL

## 2023-09-07 VITALS
SYSTOLIC BLOOD PRESSURE: 124 MMHG | BODY MASS INDEX: 29 KG/M2 | TEMPERATURE: 98.6 F | HEART RATE: 81 BPM | OXYGEN SATURATION: 96 % | DIASTOLIC BLOOD PRESSURE: 82 MMHG | WEIGHT: 219.8 LBS

## 2023-09-07 DIAGNOSIS — M25.571 PAIN IN JOINT INVOLVING ANKLE AND FOOT, RIGHT: Primary | ICD-10-CM

## 2023-09-07 LAB
BASOPHILS # BLD AUTO: 0.1 10E3/UL (ref 0–0.2)
BASOPHILS NFR BLD AUTO: 1 %
CRP SERPL-MCNC: <3 MG/L
EOSINOPHIL # BLD AUTO: 0.4 10E3/UL (ref 0–0.7)
EOSINOPHIL NFR BLD AUTO: 4 %
ERYTHROCYTE [DISTWIDTH] IN BLOOD BY AUTOMATED COUNT: 12.1 % (ref 10–15)
HCT VFR BLD AUTO: 47.4 % (ref 40–53)
HGB BLD-MCNC: 16.2 G/DL (ref 13.3–17.7)
IMM GRANULOCYTES # BLD: 0 10E3/UL
IMM GRANULOCYTES NFR BLD: 0 %
LYMPHOCYTES # BLD AUTO: 1.9 10E3/UL (ref 0.8–5.3)
LYMPHOCYTES NFR BLD AUTO: 19 %
MCH RBC QN AUTO: 31.2 PG (ref 26.5–33)
MCHC RBC AUTO-ENTMCNC: 34.2 G/DL (ref 31.5–36.5)
MCV RBC AUTO: 91 FL (ref 78–100)
MONOCYTES # BLD AUTO: 1.1 10E3/UL (ref 0–1.3)
MONOCYTES NFR BLD AUTO: 11 %
NEUTROPHILS # BLD AUTO: 6.6 10E3/UL (ref 1.6–8.3)
NEUTROPHILS NFR BLD AUTO: 65 %
NRBC # BLD AUTO: 0 10E3/UL
NRBC BLD AUTO-RTO: 0 /100
PLATELET # BLD AUTO: 225 10E3/UL (ref 150–450)
RBC # BLD AUTO: 5.2 10E6/UL (ref 4.4–5.9)
URATE SERPL-MCNC: 5.4 MG/DL (ref 3.4–7)
WBC # BLD AUTO: 10.2 10E3/UL (ref 4–11)

## 2023-09-07 PROCEDURE — 85025 COMPLETE CBC W/AUTO DIFF WBC: CPT | Performed by: FAMILY MEDICINE

## 2023-09-07 PROCEDURE — 86618 LYME DISEASE ANTIBODY: CPT | Performed by: FAMILY MEDICINE

## 2023-09-07 PROCEDURE — 84550 ASSAY OF BLOOD/URIC ACID: CPT | Performed by: FAMILY MEDICINE

## 2023-09-07 PROCEDURE — 36415 COLL VENOUS BLD VENIPUNCTURE: CPT | Performed by: FAMILY MEDICINE

## 2023-09-07 PROCEDURE — 86200 CCP ANTIBODY: CPT | Performed by: FAMILY MEDICINE

## 2023-09-07 PROCEDURE — 86431 RHEUMATOID FACTOR QUANT: CPT | Performed by: FAMILY MEDICINE

## 2023-09-07 PROCEDURE — 86140 C-REACTIVE PROTEIN: CPT | Performed by: FAMILY MEDICINE

## 2023-09-07 PROCEDURE — 99213 OFFICE O/P EST LOW 20 MIN: CPT | Performed by: FAMILY MEDICINE

## 2023-09-07 ASSESSMENT — PAIN SCALES - GENERAL: PAINLEVEL: MODERATE PAIN (4)

## 2023-09-11 LAB
B BURGDOR IGG+IGM SER QL: 0.08
CCP AB SER IA-ACNC: 0.5 U/ML
RHEUMATOID FACT SER NEPH-ACNC: <6 IU/ML

## 2023-11-01 DIAGNOSIS — I10 ESSENTIAL HYPERTENSION: ICD-10-CM

## 2023-11-02 RX ORDER — LISINOPRIL 10 MG/1
20 TABLET ORAL DAILY
Qty: 180 TABLET | Refills: 1 | Status: SHIPPED | OUTPATIENT
Start: 2023-11-02 | End: 2024-05-09

## 2023-11-02 NOTE — TELEPHONE ENCOUNTER
Lisinopril (ZESTRIL) 10 mg tab      Last Written Prescription Date:  7/20/23  Last Fill Quantity: 90,   # refills: 1  Last Office Visit: 9/7/23

## 2024-03-04 ENCOUNTER — MYC MEDICAL ADVICE (OUTPATIENT)
Dept: FAMILY MEDICINE | Facility: OTHER | Age: 39
End: 2024-03-04

## 2024-03-04 NOTE — TELEPHONE ENCOUNTER
3/4/2024 2:47 PM  Pt called and scheduled.  Future Appointments 3/4/2024 - 8/31/2024        Date Visit Type Length Department Provider     3/14/2024  1:00 PM SHORT 30 min [facility] Nuha Hobbs MD    Location Instructions:     From [place]: Take [address]. Turn left at [address]. Turn left at the first stoplight on [address]. At the first stop sign, take a right onto [address]. The upper level parking lot will be on the left. East Entrance Door number 10.   From [place]: Take [address]. Take a right at [address]. At the first stop sign, take a right onto [address]. The upper level parking lot will be on the left. East Entrance Door number 10.   From [place]: Take [address]. Take the [address] ramp towards [place]. Turn left onto [address]. Take a slight right onto [address]. Turn left at the first stoplight on [address]. At the first stop sign, take a right onto [address]. The upper level parking lot will be on the left. East Entrance Door number 10.                  Patient advised - If you have any new or worsening symptoms or need immediate medical attention call 911, please go to the Emergency Room and/or Urgent Care. Patient verbalizes understanding and  agrees to plan.

## 2024-03-04 NOTE — PROGRESS NOTES
"  Assessment & Plan     Abdominal pain, unspecified abdominal location  Mostly epigastric.  Broad differential - gastritis, gerd, pud, medication side effect, irritable bowel, etc.  Update labs today.  US to evaluate gallbladder.  Trial of Prilosec for 2 weeks.  Consider EGD/colonoscopy.  Journal symptoms, foods.  - US Abdomen Limited; Future  - omeprazole (PRILOSEC) 20 MG DR capsule; Take 1 capsule (20 mg) by mouth daily  - CBC with platelets and differential; Future  - Basic metabolic panel; Future  - Hepatic panel (Albumin, ALT, AST, Bili, Alk Phos, TP); Future  - Amylase; Future  - Lipase; Future  - CRP, inflammation; Future  - ESR: Erythrocyte sedimentation rate; Future  - ESR: Erythrocyte sedimentation rate  - CRP, inflammation  - Lipase  - Amylase  - Hepatic panel (Albumin, ALT, AST, Bili, Alk Phos, TP)  - Basic metabolic panel  - CBC with platelets and differential    Family history of abdominal aortic aneurysm (AAA)  2 relatives.  Was advised to have screening US.  Order placed.    MS (multiple sclerosis) (H)  Managed by neurology.  Continue with Ocrevus.      BMI  Estimated body mass index is 29.79 kg/m  as calculated from the following:    Height as of 8/20/23: 1.854 m (6' 1\").    Weight as of this encounter: 102.4 kg (225 lb 12.8 oz).       See Patient Instructions    No follow-ups on file.    Wojciech Leigh is a 38 year old, presenting for the following health issues:  GI Problem, Recheck Medication, and Multiple Sclerosis        3/14/2024     1:00 PM   Additional Questions   Roomed by Yifan Rodgers   Accompanied by None         3/14/2024     1:00 PM   Patient Reported Additional Medications   Patient reports taking the following new medications None     HPI     Concern - GI issue/ Breathing concern   Onset: Chronic  Description:   Patient states that since he has started taking the Ocrelizumab/Ocrevus for his MS he has noticed that he get abdominal pain, hard to breathe, having to clear throat " often and slight chest discomfort. He states that is usually the worst after eating a meal and will start to improve a couple of hours after.  Intensity: moderate, severe  Progression of Symptoms:  worsening and waxing and waning  Accompanying Signs & Symptoms:  Abdominal pain, chest discomfort, clearing the throat  and hard time breathing   Previous history of similar problem:   No   Precipitating factors:   Worsened by: Eating  Alleviating factors:  Improved by: Resting  Therapies Tried and outcome: Resting; no OTC aids    On Ocrevus for couple years.    Starts with some stomach discomfort, then feels like he can't breathe that well.  At times, feels like he needs to clear throat.  After meals - any meal.    Doesn't eat breakfast.  Maybe worse with spicy foods.    Sometimes nauseated.  No vomiting.  Sometimes diarrhea.  No blood in stool. Unless straining.  Every couple months - wakes up out of sleep - severe stomach pain - has BM - normal, feels nauseated, clammy, resolves  Does not feel reflux/acid.  Sometimes bloating.    Lasts couple hours.  Occurring daily at this point.  Slowly progressive.    Dad AAA -  monitoring size.   Family history - paternal aunt.  Advised patient be screened.    Weight stable  Multiple sclerosis follow up   Follows with neurology.  Told to see GI specialist.    S/p appendectomy.   As teen - upper/lower endoscopy - stomach pains.  Ulcer age 21.    Hyperlipidemia Follow-Up  Are you regularly taking any medication or supplement to lower your cholesterol?   No  Are you having muscle aches or other side effects that you think could be caused by your cholesterol lowering medication?  No    Hypertension Follow-up  Do you check your blood pressure regularly outside of the clinic? No   Are you following a low salt diet? No  Are your blood pressures ever more than 140 on the top number (systolic) OR more   than 90 on the bottom number (diastolic), for example 140/90? Patient does not check BP  outside of the clinic     How many servings of fruits and vegetables do you eat daily?  2-3  On average, how many sweetened beverages do you drink each day (Examples: soda, juice, sweet tea, etc.  Do NOT count diet or artificially sweetened beverages)?   2  How many days per week do you exercise enough to make your heart beat faster? 3 or less  How many minutes a day do you exercise enough to make your heart beat faster? 9 or less  How many days per week do you miss taking your medication? 0        Review of Systems  Constitutional, HEENT, cardiovascular, pulmonary, gi and gu systems are negative, except as otherwise noted.      Objective    /87 (BP Location: Left arm, Patient Position: Sitting, Cuff Size: Adult Large)   Pulse 62   Temp 98  F (36.7  C) (Tympanic)   Resp 16   Wt 102.4 kg (225 lb 12.8 oz)   SpO2 97%   BMI 29.79 kg/m    Body mass index is 29.79 kg/m .  Physical Exam   GENERAL: alert and no distress  NECK: no adenopathy, no asymmetry, masses, or scars  RESP: lungs clear to auscultation - no rales, rhonchi or wheezes  CV: regular rate and rhythm, normal S1 S2, no S3 or S4, no murmur, click or rub, no peripheral edema  ABDOMEN: soft, nontender, no hepatosplenomegaly, no masses and bowel sounds normal  MS: no gross musculoskeletal defects noted, no edema  PSYCH: mentation appears normal, affect normal/bright    Labs pending        Signed Electronically by: Nuha Barrientos MD

## 2024-03-14 ENCOUNTER — OFFICE VISIT (OUTPATIENT)
Dept: FAMILY MEDICINE | Facility: OTHER | Age: 39
End: 2024-03-14
Attending: FAMILY MEDICINE
Payer: COMMERCIAL

## 2024-03-14 VITALS
OXYGEN SATURATION: 97 % | WEIGHT: 225.8 LBS | BODY MASS INDEX: 29.79 KG/M2 | SYSTOLIC BLOOD PRESSURE: 128 MMHG | RESPIRATION RATE: 16 BRPM | TEMPERATURE: 98 F | HEART RATE: 62 BPM | DIASTOLIC BLOOD PRESSURE: 87 MMHG

## 2024-03-14 DIAGNOSIS — G35 MS (MULTIPLE SCLEROSIS) (H): ICD-10-CM

## 2024-03-14 DIAGNOSIS — Z82.49 FAMILY HISTORY OF ABDOMINAL AORTIC ANEURYSM (AAA): ICD-10-CM

## 2024-03-14 DIAGNOSIS — R10.9 ABDOMINAL PAIN, UNSPECIFIED ABDOMINAL LOCATION: Primary | ICD-10-CM

## 2024-03-14 LAB
ALBUMIN SERPL BCG-MCNC: 4.5 G/DL (ref 3.5–5.2)
ALP SERPL-CCNC: 87 U/L (ref 40–150)
ALT SERPL W P-5'-P-CCNC: 25 U/L (ref 0–70)
AMYLASE SERPL-CCNC: 70 U/L (ref 28–100)
ANION GAP SERPL CALCULATED.3IONS-SCNC: 11 MMOL/L (ref 7–15)
AST SERPL W P-5'-P-CCNC: 18 U/L (ref 0–45)
BASOPHILS # BLD AUTO: 0.2 10E3/UL (ref 0–0.2)
BASOPHILS NFR BLD AUTO: 1 %
BILIRUB DIRECT SERPL-MCNC: <0.2 MG/DL (ref 0–0.3)
BILIRUB SERPL-MCNC: 0.4 MG/DL
BUN SERPL-MCNC: 13.7 MG/DL (ref 6–20)
CALCIUM SERPL-MCNC: 9.8 MG/DL (ref 8.6–10)
CHLORIDE SERPL-SCNC: 100 MMOL/L (ref 98–107)
CREAT SERPL-MCNC: 0.96 MG/DL (ref 0.67–1.17)
CRP SERPL-MCNC: <3 MG/L
DEPRECATED HCO3 PLAS-SCNC: 28 MMOL/L (ref 22–29)
EGFRCR SERPLBLD CKD-EPI 2021: >90 ML/MIN/1.73M2
EOSINOPHIL # BLD AUTO: 0.5 10E3/UL (ref 0–0.7)
EOSINOPHIL NFR BLD AUTO: 4 %
ERYTHROCYTE [DISTWIDTH] IN BLOOD BY AUTOMATED COUNT: 12.1 % (ref 10–15)
ERYTHROCYTE [SEDIMENTATION RATE] IN BLOOD BY WESTERGREN METHOD: 3 MM/HR (ref 0–15)
GLUCOSE SERPL-MCNC: 94 MG/DL (ref 70–99)
HCT VFR BLD AUTO: 45.9 % (ref 40–53)
HGB BLD-MCNC: 16 G/DL (ref 13.3–17.7)
IMM GRANULOCYTES # BLD: 0 10E3/UL
IMM GRANULOCYTES NFR BLD: 0 %
LIPASE SERPL-CCNC: 55 U/L (ref 13–60)
LYMPHOCYTES # BLD AUTO: 2.1 10E3/UL (ref 0.8–5.3)
LYMPHOCYTES NFR BLD AUTO: 20 %
MCH RBC QN AUTO: 30.9 PG (ref 26.5–33)
MCHC RBC AUTO-ENTMCNC: 34.9 G/DL (ref 31.5–36.5)
MCV RBC AUTO: 89 FL (ref 78–100)
MONOCYTES # BLD AUTO: 1.2 10E3/UL (ref 0–1.3)
MONOCYTES NFR BLD AUTO: 12 %
NEUTROPHILS # BLD AUTO: 6.7 10E3/UL (ref 1.6–8.3)
NEUTROPHILS NFR BLD AUTO: 63 %
NRBC # BLD AUTO: 0 10E3/UL
NRBC BLD AUTO-RTO: 0 /100
PLATELET # BLD AUTO: 232 10E3/UL (ref 150–450)
POTASSIUM SERPL-SCNC: 4.2 MMOL/L (ref 3.4–5.3)
PROT SERPL-MCNC: 6.7 G/DL (ref 6.4–8.3)
RBC # BLD AUTO: 5.17 10E6/UL (ref 4.4–5.9)
SODIUM SERPL-SCNC: 139 MMOL/L (ref 135–145)
WBC # BLD AUTO: 10.7 10E3/UL (ref 4–11)

## 2024-03-14 PROCEDURE — 82248 BILIRUBIN DIRECT: CPT | Performed by: FAMILY MEDICINE

## 2024-03-14 PROCEDURE — 86140 C-REACTIVE PROTEIN: CPT | Performed by: FAMILY MEDICINE

## 2024-03-14 PROCEDURE — 80053 COMPREHEN METABOLIC PANEL: CPT | Performed by: FAMILY MEDICINE

## 2024-03-14 PROCEDURE — 36415 COLL VENOUS BLD VENIPUNCTURE: CPT | Performed by: FAMILY MEDICINE

## 2024-03-14 PROCEDURE — 83690 ASSAY OF LIPASE: CPT | Performed by: FAMILY MEDICINE

## 2024-03-14 PROCEDURE — 85025 COMPLETE CBC W/AUTO DIFF WBC: CPT | Performed by: FAMILY MEDICINE

## 2024-03-14 PROCEDURE — 85652 RBC SED RATE AUTOMATED: CPT | Performed by: FAMILY MEDICINE

## 2024-03-14 PROCEDURE — 99214 OFFICE O/P EST MOD 30 MIN: CPT | Performed by: FAMILY MEDICINE

## 2024-03-14 PROCEDURE — 82150 ASSAY OF AMYLASE: CPT | Performed by: FAMILY MEDICINE

## 2024-03-14 ASSESSMENT — PAIN SCALES - GENERAL: PAINLEVEL: MODERATE PAIN (4)

## 2024-03-14 NOTE — PATIENT INSTRUCTIONS
Lab today - will notify of results.  Ultrasound ordered - radiology will call to schedule - gallbladder, but also AAA screen.    Start Prilosec 20 mg daily for 2 week trial and reassess.    Avoid/limit typical reflux offender.    Consider upper/lower endoscopy.

## 2024-03-19 ENCOUNTER — APPOINTMENT (OUTPATIENT)
Dept: ULTRASOUND IMAGING | Facility: HOSPITAL | Age: 39
End: 2024-03-19
Attending: STUDENT IN AN ORGANIZED HEALTH CARE EDUCATION/TRAINING PROGRAM
Payer: COMMERCIAL

## 2024-03-19 ENCOUNTER — HOSPITAL ENCOUNTER (EMERGENCY)
Facility: HOSPITAL | Age: 39
Discharge: HOME | End: 2024-03-19
Attending: STUDENT IN AN ORGANIZED HEALTH CARE EDUCATION/TRAINING PROGRAM | Admitting: STUDENT IN AN ORGANIZED HEALTH CARE EDUCATION/TRAINING PROGRAM
Payer: COMMERCIAL

## 2024-03-19 ENCOUNTER — MYC MEDICAL ADVICE (OUTPATIENT)
Dept: FAMILY MEDICINE | Facility: OTHER | Age: 39
End: 2024-03-19

## 2024-03-19 ENCOUNTER — NURSE TRIAGE (OUTPATIENT)
Dept: CARE COORDINATION | Facility: OTHER | Age: 39
End: 2024-03-19

## 2024-03-19 VITALS
TEMPERATURE: 97 F | OXYGEN SATURATION: 97 % | DIASTOLIC BLOOD PRESSURE: 94 MMHG | SYSTOLIC BLOOD PRESSURE: 114 MMHG | RESPIRATION RATE: 16 BRPM | HEART RATE: 78 BPM

## 2024-03-19 DIAGNOSIS — R10.13 EPIGASTRIC PAIN: ICD-10-CM

## 2024-03-19 LAB
ALBUMIN SERPL BCG-MCNC: 4.5 G/DL (ref 3.5–5.2)
ALBUMIN UR-MCNC: 20 MG/DL
ALP SERPL-CCNC: 100 U/L (ref 40–150)
ALT SERPL W P-5'-P-CCNC: 26 U/L (ref 0–70)
ANION GAP SERPL CALCULATED.3IONS-SCNC: 12 MMOL/L (ref 7–15)
APPEARANCE UR: CLEAR
AST SERPL W P-5'-P-CCNC: 24 U/L (ref 0–45)
BASOPHILS # BLD AUTO: 0.1 10E3/UL (ref 0–0.2)
BASOPHILS NFR BLD AUTO: 1 %
BILIRUB SERPL-MCNC: 0.7 MG/DL
BILIRUB UR QL STRIP: NEGATIVE
BUN SERPL-MCNC: 11 MG/DL (ref 6–20)
CALCIUM SERPL-MCNC: 9.7 MG/DL (ref 8.6–10)
CHLORIDE SERPL-SCNC: 101 MMOL/L (ref 98–107)
COLOR UR AUTO: YELLOW
CREAT SERPL-MCNC: 0.92 MG/DL (ref 0.67–1.17)
DEPRECATED HCO3 PLAS-SCNC: 26 MMOL/L (ref 22–29)
EGFRCR SERPLBLD CKD-EPI 2021: >90 ML/MIN/1.73M2
EOSINOPHIL # BLD AUTO: 0.3 10E3/UL (ref 0–0.7)
EOSINOPHIL NFR BLD AUTO: 2 %
ERYTHROCYTE [DISTWIDTH] IN BLOOD BY AUTOMATED COUNT: 12.2 % (ref 10–15)
GLUCOSE SERPL-MCNC: 99 MG/DL (ref 70–99)
GLUCOSE UR STRIP-MCNC: NEGATIVE MG/DL
HCT VFR BLD AUTO: 49 % (ref 40–53)
HGB BLD-MCNC: 16.9 G/DL (ref 13.3–17.7)
HGB UR QL STRIP: NEGATIVE
HOLD SPECIMEN: NORMAL
IMM GRANULOCYTES # BLD: 0 10E3/UL
IMM GRANULOCYTES NFR BLD: 0 %
KETONES UR STRIP-MCNC: 100 MG/DL
LEUKOCYTE ESTERASE UR QL STRIP: NEGATIVE
LIPASE SERPL-CCNC: 35 U/L (ref 13–60)
LYMPHOCYTES # BLD AUTO: 1.6 10E3/UL (ref 0.8–5.3)
LYMPHOCYTES NFR BLD AUTO: 15 %
MCH RBC QN AUTO: 30.7 PG (ref 26.5–33)
MCHC RBC AUTO-ENTMCNC: 34.5 G/DL (ref 31.5–36.5)
MCV RBC AUTO: 89 FL (ref 78–100)
MONOCYTES # BLD AUTO: 1.4 10E3/UL (ref 0–1.3)
MONOCYTES NFR BLD AUTO: 13 %
MUCOUS THREADS #/AREA URNS LPF: PRESENT /LPF
NEUTROPHILS # BLD AUTO: 7.2 10E3/UL (ref 1.6–8.3)
NEUTROPHILS NFR BLD AUTO: 69 %
NITRATE UR QL: NEGATIVE
NRBC # BLD AUTO: 0 10E3/UL
NRBC BLD AUTO-RTO: 0 /100
PH UR STRIP: 6.5 [PH] (ref 4.7–8)
PLATELET # BLD AUTO: 203 10E3/UL (ref 150–450)
POTASSIUM SERPL-SCNC: 4.1 MMOL/L (ref 3.4–5.3)
PROT SERPL-MCNC: 6.9 G/DL (ref 6.4–8.3)
RBC # BLD AUTO: 5.51 10E6/UL (ref 4.4–5.9)
RBC URINE: 0 /HPF
SODIUM SERPL-SCNC: 139 MMOL/L (ref 135–145)
SP GR UR STRIP: 1.03 (ref 1–1.03)
SPERM #/AREA URNS HPF: PRESENT /HPF
SQUAMOUS EPITHELIAL: 0 /HPF
UROBILINOGEN UR STRIP-MCNC: 2 MG/DL
WBC # BLD AUTO: 10.5 10E3/UL (ref 4–11)
WBC URINE: 4 /HPF

## 2024-03-19 PROCEDURE — 84155 ASSAY OF PROTEIN SERUM: CPT | Performed by: STUDENT IN AN ORGANIZED HEALTH CARE EDUCATION/TRAINING PROGRAM

## 2024-03-19 PROCEDURE — 85004 AUTOMATED DIFF WBC COUNT: CPT | Performed by: STUDENT IN AN ORGANIZED HEALTH CARE EDUCATION/TRAINING PROGRAM

## 2024-03-19 PROCEDURE — 250N000011 HC RX IP 250 OP 636: Mod: JZ | Performed by: STUDENT IN AN ORGANIZED HEALTH CARE EDUCATION/TRAINING PROGRAM

## 2024-03-19 PROCEDURE — 99285 EMERGENCY DEPT VISIT HI MDM: CPT | Mod: 25 | Performed by: STUDENT IN AN ORGANIZED HEALTH CARE EDUCATION/TRAINING PROGRAM

## 2024-03-19 PROCEDURE — 93010 ELECTROCARDIOGRAM REPORT: CPT | Performed by: INTERNAL MEDICINE

## 2024-03-19 PROCEDURE — 81001 URINALYSIS AUTO W/SCOPE: CPT | Performed by: STUDENT IN AN ORGANIZED HEALTH CARE EDUCATION/TRAINING PROGRAM

## 2024-03-19 PROCEDURE — 96374 THER/PROPH/DIAG INJ IV PUSH: CPT | Performed by: STUDENT IN AN ORGANIZED HEALTH CARE EDUCATION/TRAINING PROGRAM

## 2024-03-19 PROCEDURE — 83690 ASSAY OF LIPASE: CPT | Performed by: STUDENT IN AN ORGANIZED HEALTH CARE EDUCATION/TRAINING PROGRAM

## 2024-03-19 PROCEDURE — 99284 EMERGENCY DEPT VISIT MOD MDM: CPT | Performed by: STUDENT IN AN ORGANIZED HEALTH CARE EDUCATION/TRAINING PROGRAM

## 2024-03-19 PROCEDURE — 76705 ECHO EXAM OF ABDOMEN: CPT

## 2024-03-19 PROCEDURE — 93005 ELECTROCARDIOGRAM TRACING: CPT | Mod: RTG

## 2024-03-19 PROCEDURE — 36415 COLL VENOUS BLD VENIPUNCTURE: CPT | Performed by: STUDENT IN AN ORGANIZED HEALTH CARE EDUCATION/TRAINING PROGRAM

## 2024-03-19 RX ORDER — DROPERIDOL 2.5 MG/ML
0.62 INJECTION, SOLUTION INTRAMUSCULAR; INTRAVENOUS ONCE
Status: COMPLETED | OUTPATIENT
Start: 2024-03-19 | End: 2024-03-19

## 2024-03-19 RX ORDER — SUCRALFATE ORAL 1 G/10ML
1 SUSPENSION ORAL 4 TIMES DAILY
Qty: 414 ML | Refills: 0 | Status: SHIPPED | OUTPATIENT
Start: 2024-03-19

## 2024-03-19 RX ADMIN — DROPERIDOL 0.62 MG: 2.5 INJECTION, SOLUTION INTRAMUSCULAR; INTRAVENOUS at 16:28

## 2024-03-19 ASSESSMENT — ACTIVITIES OF DAILY LIVING (ADL)
ADLS_ACUITY_SCORE: 35
ADLS_ACUITY_SCORE: 35

## 2024-03-19 ASSESSMENT — COLUMBIA-SUICIDE SEVERITY RATING SCALE - C-SSRS
6. HAVE YOU EVER DONE ANYTHING, STARTED TO DO ANYTHING, OR PREPARED TO DO ANYTHING TO END YOUR LIFE?: NO
2. HAVE YOU ACTUALLY HAD ANY THOUGHTS OF KILLING YOURSELF IN THE PAST MONTH?: NO
1. IN THE PAST MONTH, HAVE YOU WISHED YOU WERE DEAD OR WISHED YOU COULD GO TO SLEEP AND NOT WAKE UP?: NO

## 2024-03-19 NOTE — ED PROVIDER NOTES
[facility]  ED Provider Note    Chief Complaint   Patient presents with    Abdominal Pain     C/o upper abd pain, vomiting and diarrhea. Notes symptoms since last night.      History:  Reese Sanchez is a 38 year old male with complaints of abdominal pain vomiting diarrhea.  He tells the nurse that his symptoms have been going on since last night he tells me that has been intermittent for the last year but constant for the last 6 months.  He defines constant as coming and going mostly with food.  No fevers or chills.  He has tried taking antacids and this did not help.  No urinary symptoms    Review of Systems   Performed; see HPI for pertinent positives and negatives.     Medical history, surgical history, and social history was reviewed.  Nursing documentation, triage note, and vitals were reviewed.    Vitals:  BP: (!) 156/103  Pulse: 81  Temp: 97  F (36.1  C)  Resp: 16  SpO2: 98 %    Physical Exam:  Constitutional: Alert and conversant. NAD   HENT: NCAT   Eyes: Normal pupils   Neck: supple   CV: No pallor  Pulmonary/Chest: Non-labored respirations  Abdominal: non-distended mild epigastric tenderness, negative Joshi sign, no rebound no guarding no pain McBurney's point  MSK: FIELD.   Neuro: Alert and appropriate   Skin: Warm and dry. No diaphoresis. No rashes on exposed skin    Psych: Appropriate mood and affect       MDM:      ED Course as of 03/19/24 1823   Tue Mar 19, 2024   1606 Differential includes but is not limited to appendicitis, cholecystitis, pancreatitis, nephrolithiasis, gastroesophageal reflux disease, gastritis, pyelonephritis, urinary tract infection, testicular torsion, mesenteric ischemia, strangulated hernia, aortic aneurysm.    1703 Laboratory evaluation unremarkable.  Ultrasound also unremarkable   1703 Based on the most likely epigastric diagnoses, with pancreatitis off the table, cholelithiasis off of the table or other biliary etiologies unlikely, it seems Dr. Hobbs's  initial thought of a gastritis/peptic ulcer disease diagnosis is most likely.  I will double his omeprazole start him on Carafate and have him follow-up with his primary        Procedures:  Procedures    Impression:  Final diagnoses:   Epigastric pain           Kalyan Rock MD  03/19/24 0221

## 2024-03-19 NOTE — TELEPHONE ENCOUNTER
"Reason for Disposition   Pain is mainly in upper abdomen (if needed ask: 'is it mainly above the belly button?')   [1] Pain lasts > 10 minutes AND [2] age > 35 AND [3] at least one cardiac risk factor (e.g., diabetes, high cholesterol, hypertension, obesity, smoker or strong family history of heart disease)    Answer Assessment - Initial Assessment Questions  1. LOCATION: \"Where does it hurt?\"       Under ribs in the middle  2. RADIATION: \"Does the pain shoot anywhere else?\" (e.g., chest, back)      Mid back pain  3. ONSET: \"When did the pain begin?\" (Minutes, hours or days ago)       yesterday  4. SUDDEN: \"Gradual or sudden onset?\"      sudden  5. PATTERN \"Does the pain come and go, or is it constant?\"     - If it comes and goes: \"How long does it last?\" \"Do you have pain now?\"      (Note: Comes and goes means the pain is intermittent. It goes away completely between bouts.)     - If constant: \"Is it getting better, staying the same, or getting worse?\"       (Note: Constant means the pain never goes away completely; most serious pain is constant and gets worse.)       constant  6. SEVERITY: \"How bad is the pain?\"  (e.g., Scale 1-10; mild, moderate, or severe)     - MILD (1-3): Doesn't interfere with normal activities, abdomen soft and not tender to touch.      - MODERATE (4-7): Interferes with normal activities or awakens from sleep, abdomen tender to touch.      - SEVERE (8-10): Excruciating pain, doubled over, unable to do any normal activities.        7/10  7. RECURRENT SYMPTOM: \"Have you ever had this type of stomach pain before?\" If Yes, ask: \"When was the last time?\" and \"What happened that time?\"       no  8. CAUSE: \"What do you think is causing the stomach pain?\"      Unknown  patient is vomiting and diarrhea and bloated.  He doesn't think it is the flu  9. RELIEVING/AGGRAVATING FACTORS: \"What makes it better or worse?\" (e.g., antacids, bending or twisting motion, bowel movement)      Antacids did not help " "actually made his stomach hurt more  10. OTHER SYMPTOMS: \"Do you have any other symptoms?\" (e.g., back pain, diarrhea, fever, urination pain, vomiting)        Back pain, diarrhea vomit    Answer Assessment - Initial Assessment Questions  1. LOCATION: \"Where does it hurt?\"       constant  2. RADIATION: \"Does the pain shoot anywhere else?\" (e.g., chest, back)      back  3. ONSET: \"When did the pain begin?\" (e.g., minutes, hours or days ago)       yesterday  4. SUDDEN: \"Gradual or sudden onset?\"      sudden  5. PATTERN \"Does the pain come and go, or is it constant?\"     - If it comes and goes: \"How long does it last?\" \"Do you have pain now?\"      (Note: Comes and goes means the pain is intermittent. It goes away completely between bouts.)     - If constant: \"Is it getting better, staying the same, or getting worse?\"       (Note: Constant means the pain never goes away completely; most serious pain is constant and gets worse.)       constant  6. SEVERITY: \"How bad is the pain?\"  (e.g., Scale 1-10; mild, moderate, or severe)     - MILD (1-3): Doesn't interfere with normal activities, abdomen soft and not tender to touch..      - MODERATE (4-7): Interferes with normal activities or awakens from sleep, abdomen tender to touch.      - SEVERE (8-10): Excruciating pain, doubled over, unable to do any normal activities.        7/10  7. RECURRENT SYMPTOM: \"Have you ever had this type of stomach pain before?\" If Yes, ask: \"When was the last time?\" and \"What happened that time?\"       no  8. AGGRAVATING FACTORS: \"Does anything seem to cause this pain?\" (e.g., foods, stress, alcohol)      Only water  9. CARDIAC SYMPTOMS: \"Do you have any of the following symptoms: chest pain, difficulty breathing, sweating, nausea?\"      no  10. OTHER SYMPTOMS: \"Do you have any other symptoms?\" (e.g., back pain, diarrhea, fever, urination pain, vomiting)        Diarrhea vomiting  11. PREGNANCY: \"Is there any chance you are pregnant?\" \"When was " "your last menstrual period?\"        no    Protocols used: Abdominal Pain - Male-A-OH, Abdominal Pain - Upper-A-AH    "

## 2024-03-19 NOTE — DISCHARGE INSTRUCTIONS
Double your dose of omeprazole to 40 mg daily.  Add Carafate.  Follow-up with your primary care provider within the next week.  Return to the emergency room if worsening symptoms or new concerning symptoms.

## 2024-03-20 ENCOUNTER — TELEPHONE (OUTPATIENT)
Dept: EMERGENCY MEDICINE | Facility: HOSPITAL | Age: 39
End: 2024-03-20

## 2024-03-20 LAB
ATRIAL RATE - MUSE: 79 BPM
DIASTOLIC BLOOD PRESSURE - MUSE: NORMAL MMHG
INTERPRETATION ECG - MUSE: NORMAL
P AXIS - MUSE: 50 DEGREES
PR INTERVAL - MUSE: 150 MS
QRS DURATION - MUSE: 88 MS
QT - MUSE: 374 MS
QTC - MUSE: 428 MS
R AXIS - MUSE: -15 DEGREES
SYSTOLIC BLOOD PRESSURE - MUSE: NORMAL MMHG
T AXIS - MUSE: 27 DEGREES
VENTRICULAR RATE- MUSE: 79 BPM

## 2024-03-20 NOTE — TELEPHONE ENCOUNTER
Received a PA request from [employer] for sucralfate (CARAFATE) 1 GM/10ML suspension. Submitted on CMM. Waiting for a response.

## 2024-03-20 NOTE — TELEPHONE ENCOUNTER
Received an APPROVAL from [facility] for sucralfate (CARAFATE) 1 GM/10ML suspension.  Effective dates 2/19/24-3/20/25. [id number].

## 2024-03-25 ENCOUNTER — HOSPITAL ENCOUNTER (OUTPATIENT)
Dept: ULTRASOUND IMAGING | Facility: HOSPITAL | Age: 39
Discharge: HOME | End: 2024-03-25
Attending: FAMILY MEDICINE
Payer: COMMERCIAL

## 2024-03-25 DIAGNOSIS — Z82.49 FAMILY HISTORY OF ABDOMINAL AORTIC ANEURYSM (AAA): ICD-10-CM

## 2024-03-25 PROCEDURE — 76775 US EXAM ABDO BACK WALL LIM: CPT

## 2024-05-09 DIAGNOSIS — I10 ESSENTIAL HYPERTENSION: ICD-10-CM

## 2024-05-09 RX ORDER — LISINOPRIL 10 MG/1
20 TABLET ORAL DAILY
Qty: 180 TABLET | Refills: 1 | Status: SHIPPED | OUTPATIENT
Start: 2024-05-09

## 2024-05-09 NOTE — TELEPHONE ENCOUNTER
Lisinopril 10 mg       Last Written Prescription Date:  11/2/23  Last Fill Quantity: 180,   # refills: 1  Last Office Visit: 3/14/24  Future Office visit:       Routing refill request to provider for review/approval because:  ACE Inhibitors (Including Combos) Protocol Failed      Blood pressure under 140/90 in past 12 months- Clinicial or Patient Reported        BP Readings from Last 3 Encounters:   03/19/24 114/94   03/14/24 128/87   09/07/23 124/82      No data recorded

## 2024-06-23 ENCOUNTER — HEALTH MAINTENANCE LETTER (OUTPATIENT)
Age: 39
End: 2024-06-23

## 2024-11-09 DIAGNOSIS — I10 ESSENTIAL HYPERTENSION: ICD-10-CM

## 2024-11-11 RX ORDER — LISINOPRIL 10 MG/1
20 TABLET ORAL DAILY
Qty: 180 TABLET | Refills: 1 | Status: SHIPPED | OUTPATIENT
Start: 2024-11-11

## 2024-11-11 NOTE — TELEPHONE ENCOUNTER
lisinopril (ZESTRIL) 10 MG tablet         Last Written Prescription Date:  5/9/24  Last Fill Quantity: 180,   # refills: 1  Last Office Visit: 3/14/24  Future Office visit:       Routing refill request to provider for review/approval because:  ACE Inhibitors (Including Combos) Protocol Failed      Most recent blood pressure under 140/90 in past 12 months- Clinicial or Patient Reported        BP Readings from Last 3 Encounters:   03/19/24 114/94   03/14/24 128/87   09/07/23 124/82

## 2024-12-21 ENCOUNTER — E-VISIT (OUTPATIENT)
Dept: URGENT CARE | Facility: CLINIC | Age: 39
End: 2024-12-21
Payer: COMMERCIAL

## 2024-12-21 DIAGNOSIS — J01.90 ACUTE SINUSITIS, RECURRENCE NOT SPECIFIED, UNSPECIFIED LOCATION: Primary | ICD-10-CM

## 2024-12-21 NOTE — PATIENT INSTRUCTIONS
Acute Sinusitis: Care Instructions  Overview     Acute sinusitis is an inflammation of the mucous membranes inside the nose and sinuses. Sinuses are the hollow spaces in your skull around the eyes and nose. Acute sinusitis often follows a cold. Acute sinusitis causes thick, discolored mucus that drains from the nose or down the back of the throat. It also can cause pain and pressure in your head and face along with a stuffy or blocked nose.  In most cases, sinusitis gets better on its own in 1 to 2 weeks. But some mild symptoms may last for several weeks. Sometimes antibiotics are needed if there is a bacterial infection.  Follow-up care is a key part of your treatment and safety. Be sure to make and go to all appointments, and call your doctor if you are having problems. It's also a good idea to know your test results and keep a list of the medicines you take.  How can you care for yourself at home?  Use saline (saltwater) nasal washes. This can help keep your nasal passages open and wash out mucus and allergens.  You can buy saline nose washes at a grocery store or drugstore. Follow the instructions on the package.  You can make your own at home. Add 1 teaspoon of non-iodized salt and 1 teaspoon of baking soda to 2 cups of distilled or boiled and cooled water. Fill a squeeze bottle or a nasal cleansing pot (such as a neti pot) with the nasal wash. Then put the tip into your nostril, and lean over the sink. With your mouth open, gently squirt the liquid. Repeat on the other side.  Try a decongestant nasal spray like oxymetazoline (Afrin). Do not use it for more than 3 days in a row. Using it for more than 3 days can make your congestion worse.  If needed, take an over-the-counter pain medicine, such as acetaminophen (Tylenol), ibuprofen (Advil, Motrin), or naproxen (Aleve). Read and follow all instructions on the label.  If the doctor prescribed antibiotics, take them as directed. Do not stop taking them just  "because you feel better. You need to take the full course of antibiotics.  Be careful when taking over-the-counter cold or flu medicines and Tylenol at the same time. Many of these medicines have acetaminophen, which is Tylenol. Read the labels to make sure that you are not taking more than the recommended dose. Too much acetaminophen (Tylenol) can be harmful.  Try a steroid nasal spray. It may help with your symptoms.  Breathe warm, moist air. You can use a steamy shower, a hot bath, or a sink filled with hot water. Avoid cold, dry air. Using a humidifier in your home may help. Follow the directions for cleaning the machine.  When should you call for help?   Call your doctor now or seek immediate medical care if:    You have new or worse swelling, redness, or pain in your face or around one or both of your eyes.     You have double vision or a change in your vision.     You have a high fever.     You have a severe headache and a stiff neck.     You have mental changes, such as feeling confused or much less alert.   Watch closely for changes in your health, and be sure to contact your doctor if:    You are not getting better as expected.   Where can you learn more?  Go to https://www.HTP.net/patiented  Enter I933 in the search box to learn more about \"Acute Sinusitis: Care Instructions.\"  Current as of: September 27, 2023  Content Version: 14.3    2024 [employer].   Care instructions adapted under license by your healthcare professional. If you have questions about a medical condition or this instruction, always ask your healthcare professional. [employer] disclaims any warranty or liability for your use of this information.    You may want to try a nasal lavage (also known as nasal irrigation). You can find over-the-counter products, such as Neti-Pot, at retail locations or make your own at home. Instructions for homemade nasal lavage and more information on the process are available " online at http://www.aafp.org/afp/2009/1115/p1121.html.    Dear Reese Sanchez    After reviewing your responses, I've been able to diagnose you with Acute sinusitis, recurrence not specified, unspecified location.      Based on your responses and diagnosis, I have prescribed   Orders Placed This Encounter   Medications     amoxicillin-clavulanate (AUGMENTIN) 875-125 MG tablet     Sig: Take 1 tablet by mouth 2 times daily for 7 days.     Dispense:  14 tablet     Refill:  0      to treat your symptoms. I have sent this to your pharmacy.?     It is also important to stay well hydrated, get lots of rest and take over-the-counter decongestants,?tylenol?or ibuprofen if you?are able to?take those medications per your primary care provider to help relieve discomfort.?     It is important that you take?all of?your prescribed medication even if your symptoms are improving after a few doses.? Taking?all of?your medicine helps prevent the symptoms from returning.?     If your symptoms worsen, you develop severe headache, vomiting, high fever (>102), or are not improving in 7 days, please contact your primary care provider for an appointment or visit any of our convenient Walk-in Care or Urgent Care Centers to be seen which can be found on our website?here.?     Thanks again for choosing?us?as your health care partner,?   ?  Helen Craft PA-C?   Thank you for choosing us for your care. I have placed an order for a prescription so that you can start treatment. View your full visit summary for details by clicking on the link below. Your pharmacist will able to address any questions you may have about the medication.     If you're not feeling better within 5-7 days, please schedule an appointment.  You can schedule an appointment right here in [facility], or call [phone]  If the visit is for the same symptoms as your eVisit, we'll refund the cost of your eVisit if seen within seven days.

## 2025-04-07 ENCOUNTER — HOSPITAL ENCOUNTER (EMERGENCY)
Facility: HOSPITAL | Age: 40
Discharge: HOME | End: 2025-04-07
Attending: PHYSICIAN ASSISTANT
Payer: COMMERCIAL

## 2025-04-07 ENCOUNTER — APPOINTMENT (OUTPATIENT)
Dept: GENERAL RADIOLOGY | Facility: HOSPITAL | Age: 40
End: 2025-04-07
Attending: PHYSICIAN ASSISTANT
Payer: COMMERCIAL

## 2025-04-07 VITALS
TEMPERATURE: 98 F | OXYGEN SATURATION: 95 % | RESPIRATION RATE: 12 BRPM | DIASTOLIC BLOOD PRESSURE: 90 MMHG | SYSTOLIC BLOOD PRESSURE: 130 MMHG | HEART RATE: 75 BPM

## 2025-04-07 DIAGNOSIS — R07.9 CHEST PAIN OF UNKNOWN ETIOLOGY: ICD-10-CM

## 2025-04-07 LAB
ANION GAP SERPL CALCULATED.3IONS-SCNC: 11 MMOL/L (ref 7–15)
BASOPHILS # BLD AUTO: 0.1 10E3/UL (ref 0–0.2)
BASOPHILS NFR BLD AUTO: 1 %
BUN SERPL-MCNC: 13.1 MG/DL (ref 6–20)
CALCIUM SERPL-MCNC: 9.9 MG/DL (ref 8.8–10.4)
CHLORIDE SERPL-SCNC: 101 MMOL/L (ref 98–107)
CREAT SERPL-MCNC: 1.02 MG/DL (ref 0.67–1.17)
D DIMER PPP FEU-MCNC: <0.27 UG/ML FEU (ref 0–0.5)
EGFRCR SERPLBLD CKD-EPI 2021: >90 ML/MIN/1.73M2
EOSINOPHIL # BLD AUTO: 0.2 10E3/UL (ref 0–0.7)
EOSINOPHIL NFR BLD AUTO: 2 %
ERYTHROCYTE [DISTWIDTH] IN BLOOD BY AUTOMATED COUNT: 12.2 % (ref 10–15)
GLUCOSE SERPL-MCNC: 98 MG/DL (ref 70–99)
HCO3 SERPL-SCNC: 27 MMOL/L (ref 22–29)
HCT VFR BLD AUTO: 47.3 % (ref 40–53)
HGB BLD-MCNC: 16.7 G/DL (ref 13.3–17.7)
HOLD SPECIMEN: NORMAL
HOLD SPECIMEN: NORMAL
IMM GRANULOCYTES # BLD: 0 10E3/UL
IMM GRANULOCYTES NFR BLD: 0 %
LYMPHOCYTES # BLD AUTO: 2.4 10E3/UL (ref 0.8–5.3)
LYMPHOCYTES NFR BLD AUTO: 23 %
MCH RBC QN AUTO: 32.1 PG (ref 26.5–33)
MCHC RBC AUTO-ENTMCNC: 35.3 G/DL (ref 31.5–36.5)
MCV RBC AUTO: 91 FL (ref 78–100)
MONOCYTES # BLD AUTO: 1.4 10E3/UL (ref 0–1.3)
MONOCYTES NFR BLD AUTO: 13 %
NEUTROPHILS # BLD AUTO: 6.5 10E3/UL (ref 1.6–8.3)
NEUTROPHILS NFR BLD AUTO: 61 %
NRBC # BLD AUTO: 0 10E3/UL
NRBC BLD AUTO-RTO: 0 /100
PLATELET # BLD AUTO: 227 10E3/UL (ref 150–450)
POTASSIUM SERPL-SCNC: 4 MMOL/L (ref 3.4–5.3)
RBC # BLD AUTO: 5.21 10E6/UL (ref 4.4–5.9)
SODIUM SERPL-SCNC: 139 MMOL/L (ref 135–145)
TROPONIN T SERPL HS-MCNC: 7 NG/L
WBC # BLD AUTO: 10.6 10E3/UL (ref 4–11)

## 2025-04-07 PROCEDURE — 85379 FIBRIN DEGRADATION QUANT: CPT | Performed by: PHYSICIAN ASSISTANT

## 2025-04-07 PROCEDURE — 93005 ELECTROCARDIOGRAM TRACING: CPT

## 2025-04-07 PROCEDURE — 80048 BASIC METABOLIC PNL TOTAL CA: CPT | Performed by: PHYSICIAN ASSISTANT

## 2025-04-07 PROCEDURE — 93010 ELECTROCARDIOGRAM REPORT: CPT | Performed by: INTERNAL MEDICINE

## 2025-04-07 PROCEDURE — 71046 X-RAY EXAM CHEST 2 VIEWS: CPT

## 2025-04-07 PROCEDURE — 99284 EMERGENCY DEPT VISIT MOD MDM: CPT | Performed by: PHYSICIAN ASSISTANT

## 2025-04-07 PROCEDURE — 84484 ASSAY OF TROPONIN QUANT: CPT | Performed by: PHYSICIAN ASSISTANT

## 2025-04-07 PROCEDURE — 36415 COLL VENOUS BLD VENIPUNCTURE: CPT | Performed by: PHYSICIAN ASSISTANT

## 2025-04-07 PROCEDURE — 99285 EMERGENCY DEPT VISIT HI MDM: CPT | Mod: 25

## 2025-04-07 PROCEDURE — 71046 X-RAY EXAM CHEST 2 VIEWS: CPT | Mod: 26 | Performed by: RADIOLOGY

## 2025-04-07 PROCEDURE — 85004 AUTOMATED DIFF WBC COUNT: CPT | Performed by: PHYSICIAN ASSISTANT

## 2025-04-07 PROCEDURE — 82310 ASSAY OF CALCIUM: CPT | Performed by: PHYSICIAN ASSISTANT

## 2025-04-07 RX ORDER — LIDOCAINE 4 G/G
1 PATCH TOPICAL ONCE
Status: DISCONTINUED | OUTPATIENT
Start: 2025-04-07 | End: 2025-04-07

## 2025-04-07 RX ORDER — KETOROLAC TROMETHAMINE 30 MG/ML
30 INJECTION, SOLUTION INTRAMUSCULAR; INTRAVENOUS ONCE
Status: DISCONTINUED | OUTPATIENT
Start: 2025-04-07 | End: 2025-04-07

## 2025-04-07 RX ORDER — KETOROLAC TROMETHAMINE 30 MG/ML
60 INJECTION, SOLUTION INTRAMUSCULAR; INTRAVENOUS ONCE
Status: DISCONTINUED | OUTPATIENT
Start: 2025-04-07 | End: 2025-04-07

## 2025-04-07 RX ORDER — ASPIRIN 81 MG/1
324 TABLET, CHEWABLE ORAL ONCE
Status: DISCONTINUED | OUTPATIENT
Start: 2025-04-07 | End: 2025-04-07

## 2025-04-07 ASSESSMENT — ACTIVITIES OF DAILY LIVING (ADL)
ADLS_ACUITY_SCORE: 41
ADLS_ACUITY_SCORE: 41

## 2025-04-07 ASSESSMENT — COLUMBIA-SUICIDE SEVERITY RATING SCALE - C-SSRS
2. HAVE YOU ACTUALLY HAD ANY THOUGHTS OF KILLING YOURSELF IN THE PAST MONTH?: NO
6. HAVE YOU EVER DONE ANYTHING, STARTED TO DO ANYTHING, OR PREPARED TO DO ANYTHING TO END YOUR LIFE?: NO
1. IN THE PAST MONTH, HAVE YOU WISHED YOU WERE DEAD OR WISHED YOU COULD GO TO SLEEP AND NOT WAKE UP?: NO

## 2025-04-07 ASSESSMENT — ENCOUNTER SYMPTOMS
FATIGUE: 0
CHILLS: 0
NECK PAIN: 1
COUGH: 0
SHORTNESS OF BREATH: 0
ABDOMINAL PAIN: 0
FEVER: 0

## 2025-04-07 NOTE — ED PROVIDER NOTES
History     Chief Complaint   Patient presents with    Shoulder Pain     The history is provided by the patient.     Reese Sanchez is a 39 year old male who presented to the emergency department ambulatory for evaluation of left upper chest pain.  Ongoing for approximately 1 week.  Reports a history of cervical radiculopathy seen most recently on MRI from 2013.  Here endorses chronic daily left-sided neck pain.  He does have a history of MS.  He denies any significant dyspnea.  Pain is currently described as a 4.  It was not abrupt in onset nor maximal intensity at onset.  Not described as ripping or tearing.  He denies any unilateral weakness or headaches.    Allergies:  No Known Allergies    Problem List:    Patient Active Problem List    Diagnosis Date Noted    Mild cognitive impairment 08/18/2022     Priority: Medium     Formatting of this note might be different from the original.  Some issues with semantic retrieval. Will continue to monitor.   Dx Update from IMO     Last Assessment & Plan:   Formatting of this note might be different from the original.  Discussed sleep hygiene and stress management for cognitive improvement. Will continue to monitor for changes.      Hypogammaglobulinemia 02/15/2022     Priority: Medium     Last Assessment & Plan:   Formatting of this note might be different from the original.  Low levels of IgG 491 1/7/22 on standard reference range. Will refer patient to Dr. Swan, to address Ocrevus and implications of antibody levels.     Plan   - refer to [facility], Dr. Swan      Abnormal MRI 02/09/2022     Priority: Medium    Blurring of visual image 02/09/2022     Priority: Medium     Formatting of this note might be different from the original.  Last assessed 8/4/20    Symptoms described may be due to refractive error, dry eyes. With no pain and episodic nature, unlikely due to MS. Patient advised to follow up with ophthalmology. In the meantime patient advised to obtain  artificial tears and use regularly.    Plan:   - F/u with ophthalmologist  - Obtain artificial tears      Cervicalgia 02/09/2022     Priority: Medium     Formatting of this note might be different from the original.  Last assessed 7/14/21    L cervicalgia with radicular pain, numbness, and tingling to L arm. Onset since 3/2021, now daily and constant. Pt has not been medicating. PT helped some. Associated with multilevel cord bulging and impingement between C5-C6, C6-C7.    Current treatment: none  Past treatment; PT  PLAN  - FU with PCP  - see orthopedist if pain worsens.     Last Assessment & Plan:   Formatting of this note might be different from the original.  Ongoing pain in the neck. Surgery is a last resort option. We can trial gabapentin. Discussed the side effects that can include some sedation and swelling, but this tends to occur at higher doses.     Plan:   - Trial gabapentin 300mg BID-TID titrating up to 3 tabs TID      Heat sensitivity 02/09/2022     Priority: Medium     Formatting of this note might be different from the original.  Last assessed 7/14/21    Pt reports heat is affecting him more this year. Discussed causes for heat sensitivity in MS, typical SS, cooling techniques    PLAN  - contact [employer] for a cooling west  - will provide a letter to employer if work accommodation needed for controlled/cooled environment      Hx of autoimmune disorder 02/09/2022     Priority: Medium     Formatting of this note might be different from the original.  Exacerbation History and Treatment Response:  - 1/2019: Abrupt onset of RUE weakness and numbness/tingling, with subsequent development of RLE weakness as well. No change in speech, no facial numbness or droop. Followed-up with his PCP, MRI B obtained, showing enhancing lesions in the L parietal convexity with several other WM lesions in bilat. hemispheres. 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 MRI C showed subtle  non-enhancing abnormality. Treated with 1 g IV methylprednisolone x 3 days, with some improvement. Labwork performed, JOHNATHAN screen, ANCA, and NMO negative.   - 2/2019: LP performed, no evidence of oligoclonal bands per record view, however LP results not on file.    - 1/2020: Seen in f/u, patient reported intermittent erectile dysfunction and more noticeable fatigue. However, his R sided strength had returned to baseline.   - 2/2020: MRI B/C/T repeated, MRI B showed reduction in the size of his L parietal lobe lesion. MRI C with notable non-enhancing lesion at C4 level, and MRI T with lesion at T1-2 levels. MS dx confirmed, patient advised to begin a DMT.      Muscle weakness 02/09/2022     Priority: Medium     Formatting of this note might be different from the original.  Last assessed 6/10/20    Hx of R sided weakness in 1/2019, sxs resolved completely.     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  OCT  6/30/21: Ocrevus baseline OCTs obtained. Some measurement error noted. 107 microns OU.   2/15/22: stable OU    RNFL trend     Macula  Contrast Sensitivity      Last Assessment & Plan:   Formatting of this note might be different from the original.  OCT  2/15/22:  microns.  microns.      Paresthesias 02/09/2022     Priority: Medium     Formatting of this note might be different from the original.  R sided paresthesias with initial onset of sxs in 1/2019, sxs have since resolved.     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  Episode of mid-section banding pain in 2/2020 - 3/2020. May be related to his enhancing T spine lesions seen on MRI T from 2/3/2020.     Plan:   - Monitor for change      Visual field defect  2022     Priority: Medium     Formatting of this note might be different from the original.  HVF  21: Baseline obtained      Hyperlipidemia, unspecified hyperlipidemia type 2021     Priority: Medium    Essential hypertension 2020     Priority: Medium    Pinched nerve in neck 2020     Priority: Medium    MS (multiple sclerosis) (H) 2019     Priority: Medium        Past Medical History:    Past Medical History:   Diagnosis Date    Hypertension     MS (multiple sclerosis) (H)        Past Surgical History:    Past Surgical History:   Procedure Laterality Date    APPENDECTOMY      IR LUMBAR PUNCTURE  2019    wisdom teeth extraction         Family History:    Family History   Problem Relation Age of Onset    Arrhythmia Maternal Grandmother     Heart Disease Maternal Grandfather     Heart Disease Paternal Grandfather        Social History:  Marital Status:   [2]  Social History     Tobacco Use    Smoking status: Former     Current packs/day: 0.00     Average packs/day: 1 pack/day for 5.0 years (5.0 ttl pk-yrs)     Types: Cigarettes     Start date: 2009     Quit date: 2014     Years since quittin.2     Passive exposure: Past    Smokeless tobacco: Never   Vaping Use    Vaping status: Never Used   Substance Use Topics    Alcohol use: Yes    Drug use: No        Medications:    acetaminophen (TYLENOL) 500 MG tablet  gabapentin (NEURONTIN) 300 MG capsule  lisinopril (ZESTRIL) 10 MG tablet  ocrelizumab (OCREVUS) 300 MG/10ML SOLN injection  omeprazole (PRILOSEC) 20 MG DR capsule  sucralfate (CARAFATE) 1 GM/10ML suspension          Review of Systems   Constitutional:  Negative for chills, fatigue and fever.   Respiratory:  Negative for cough and shortness of breath.    Cardiovascular:  Positive for chest pain.   Gastrointestinal:  Negative for abdominal pain.   Genitourinary: Negative.    Musculoskeletal:  Positive for neck pain.   Skin: Negative.        Physical Exam   BP:  (!) 154/101  Pulse: 88  Temp: 98  F (36.7  C)  Resp: 16  SpO2: 96 %      Physical Exam  Vitals and nursing note reviewed.   Constitutional:       General: He is not in acute distress.     Appearance: Normal appearance. He is normal weight. He is not ill-appearing, toxic-appearing or diaphoretic.   Cardiovascular:      Rate and Rhythm: Normal rate and regular rhythm.   Pulmonary:      Effort: Pulmonary effort is normal.      Breath sounds: Normal breath sounds.   Skin:     General: Skin is warm and dry.      Capillary Refill: Capillary refill takes less than 2 seconds.   Neurological:      General: No focal deficit present.      Mental Status: He is alert and oriented to person, place, and time.         ED Course     ED Course as of 04/07/25 2107 Mon Apr 07, 2025 1839 My independent review of the EKG shows a normal sinus rhythm with sinus arrhythmia at a rate of 76.  Normal [place] interval.  Normal QRS duration.  Normal QTc.  Normal axis.  There is evidence of an incomplete right bundle branch block.  There is no concerning ST segments.   1840 Broad differential considered including but not limited to vertebral artery dissection, aortic dissection, ACS, pneumothorax, pulmonary embolism.   1847 Hemoglobin: 16.7   1847 WBC: 10.6   1927 D-Dimer Quantitative: <0.27   1937 Troponin T, High Sensitivity: 7  Given the 2 weeks of chest pain, I do not believe that there is a reasonable indication for delta troponin.   2000 My Independent review of the chest x-ray shows no evidence of focal consolidation, pneumothorax, or widened mediastinum.     Procedures              Critical Care time:  none     None         Results for orders placed or performed during the hospital encounter of 04/07/25 (from the past 24 hours)   EKG 12-lead, tracing only   Result Value Ref Range    Systolic Blood Pressure  mmHg    Diastolic Blood Pressure  mmHg    Ventricular Rate 76 BPM    Atrial Rate 76 BPM    WY Interval 154 ms    QRS Duration 96 ms      ms    QTc 414 ms    P Axis 46 degrees    R AXIS -15 degrees    T Axis 24 degrees    Interpretation ECG       Sinus rhythm with sinus arrhythmia  Incomplete right bundle branch block  Borderline ECG  When compared with ECG of 19-Mar-2024 16:00,  No significant change was found     CBC with platelets differential    Narrative    The following orders were created for panel order CBC with platelets differential.  Procedure                               Abnormality         Status                     ---------                               -----------         ------                     CBC with platelets and ...[[id number]]  Abnormal            Final result                 Please view results for these tests on the individual orders.   D dimer quantitative   Result Value Ref Range    D-Dimer Quantitative <0.27 0.00 - 0.50 ug/mL FEU    Narrative    This D-dimer assay is intended for use in conjunction with a clinical pretest probability assessment model to exclude pulmonary embolism (PE) and deep venous thrombosis (DVT) in outpatients suspected of PE or DVT. The cut-off value is 0.50 ug/mL FEU.   Basic metabolic panel   Result Value Ref Range    Sodium 139 135 - 145 mmol/L    Potassium 4.0 3.4 - 5.3 mmol/L    Chloride 101 98 - 107 mmol/L    Carbon Dioxide (CO2) 27 22 - 29 mmol/L    Anion Gap 11 7 - 15 mmol/L    Urea Nitrogen 13.1 6.0 - 20.0 mg/dL    Creatinine 1.02 0.67 - 1.17 mg/dL    GFR Estimate >90 >60 mL/min/1.73m2    Calcium 9.9 8.8 - 10.4 mg/dL    Glucose 98 70 - 99 mg/dL   Troponin T, High Sensitivity   Result Value Ref Range    Troponin T, High Sensitivity 7 <=22 ng/L   CBC with platelets and differential   Result Value Ref Range    WBC Count 10.6 4.0 - 11.0 10e3/uL    RBC Count 5.21 4.40 - 5.90 10e6/uL    Hemoglobin 16.7 13.3 - 17.7 g/dL    Hematocrit 47.3 40.0 - 53.0 %    MCV 91 78 - 100 fL    MCH 32.1 26.5 - 33.0 pg    MCHC 35.3 31.5 - 36.5 g/dL    RDW 12.2 10.0 - 15.0 %    Platelet Count 227 150 - 450  10e3/uL    % Neutrophils 61 %    % Lymphocytes 23 %    % Monocytes 13 %    % Eosinophils 2 %    % Basophils 1 %    % Immature Granulocytes 0 %    NRBCs per 100 WBC 0 <1 /100    Absolute Neutrophils 6.5 1.6 - 8.3 10e3/uL    Absolute Lymphocytes 2.4 0.8 - 5.3 10e3/uL    Absolute Monocytes 1.4 (H) 0.0 - 1.3 10e3/uL    Absolute Eosinophils 0.2 0.0 - 0.7 10e3/uL    Absolute Basophils 0.1 0.0 - 0.2 10e3/uL    Absolute Immature Granulocytes 0.0 <=0.4 10e3/uL    Absolute NRBCs 0.0 10e3/uL   Extra Tube    Narrative    The following orders were created for panel order Extra Tube.  Procedure                               Abnormality         Status                     ---------                               -----------         ------                     Extra Red Top Tube[[id number]]                              Final result               Extra Heparinized Syringe[[id number]]                       Final result                 Please view results for these tests on the individual orders.   Extra Red Top Tube   Result Value Ref Range    Hold Specimen JIC    Extra Heparinized Syringe   Result Value Ref Range    Hold Specimen JIC    XR Chest 2 Views    Narrative    EXAM: XR CHEST 2 VIEWS  LOCATION: [facility]  DATE: 4/7/2025    INDICATION: Left upper chest pain  COMPARISON: 9/2/2021.      Impression    IMPRESSION: Negative chest.       Medications - No data to display      Assessments & Plan (with Medical Decision Making)   Broad differential diagnosis considered in this patient including any number of possible etiologies from vertebral artery dissection, aortic dissection, PE, pneumothorax, ACS etc.  Given the patient's protracted symptoms that have not resolved there is no reasonable indication for delta troponin.  EKG shows no concerning features.  Chest x-ray is negative.  Long detailed discussion regarding further evaluation of the emergency department to include cross-sectional imaging of the chest in the form of  CT angiogram to rule out other vascular etiologies.  I did discuss aortic dissection and pulmonary embolism.  We also discussed the patient's protracted symptoms as well as normal.  With shared decision making and risk-benefit discussion the patient ultimately elected to forego imaging and would like to follow-up in the clinic.  This is reasonable.ADD-RS +D dimer makes his risk of aortic dissection or pulmonary embolism low.  He would like to follow-up in the clinic for further evaluation.  Strict return precautions were provided.  Mr. Sanchez voiced complete understanding and had no further questions or concerns for me.    There is no indication for further investigation or treatment on an emergent basis.  There is no reasonably foreseeable injury that would be associated with discharge and close outpatient follow-up.      This document was prepared using a combination of typing and voice generated software.  While every attempt was made for accuracy, spelling and grammatical errors may exist.   I have reviewed the nursing notes.    I have reviewed the findings, diagnosis, plan and need for follow up with the patient.           Medical Decision Making  The patient's presentation was of moderate complexity (an undiagnosed new problem with uncertain prognosis).    The patient's evaluation involved:  strong consideration of a test (CTA of the chest) that was ultimately deferred  ordering and/or review of 3+ test(s) in this encounter (multiple labs, chest x-ray, EKG)    The patient's management necessitated only low risk treatment.        Discharge Medication List as of 4/7/2025  8:22 PM          Final diagnoses:   Chest pain of unknown etiology       4/7/2025   [facility]       Tushar Rodriguez PA-C  04/07/25 [id number]

## 2025-04-07 NOTE — ED NOTES
Patient in room 11 with call light with in reach.     States he has been having pain in his left shoulder and upper chest. States it is like shivering.   He does have MS so he gets pains that pop up and it is hard to determine where is pain is from sometimes.

## 2025-04-07 NOTE — ED NOTES
Patient does not want any interventions at this time including pain medications. Would like his EKG and labs, and Xrays if Dr would like. Let provider know.

## 2025-04-07 NOTE — ED TRIAGE NOTES
Pt has had left shoulder pain, and muscle twitching for a week. Pt does report getting a new mattress. Pt states that it just twitches. Pt does have pain with arm extension, with neck pain. Pt reports a hx of pinched nerves in his neck.

## 2025-04-08 LAB
ATRIAL RATE - MUSE: 76 BPM
DIASTOLIC BLOOD PRESSURE - MUSE: NORMAL MMHG
INTERPRETATION ECG - MUSE: NORMAL
P AXIS - MUSE: 46 DEGREES
PR INTERVAL - MUSE: 154 MS
QRS DURATION - MUSE: 96 MS
QT - MUSE: 368 MS
QTC - MUSE: 414 MS
R AXIS - MUSE: -15 DEGREES
SYSTOLIC BLOOD PRESSURE - MUSE: NORMAL MMHG
T AXIS - MUSE: 24 DEGREES
VENTRICULAR RATE- MUSE: 76 BPM

## 2025-04-08 NOTE — DISCHARGE INSTRUCTIONS
Rest and stay hydrated.    Follow up clinic.    Please return to this emergency department for any new or worsening symptoms or other questions or concerns.

## 2025-05-29 NOTE — PROGRESS NOTES
"  Assessment & Plan     Essential hypertension  Stable.  However, concern that the Lisinopril may be causing his abdominal pain.    Unlikely given timing, related to eating, but possible.  Easy enough to try to change BP med first rather than has MS Ocrevis.  Will try Norvasc 5 mg in place of Lisinopril 20 mg.  He will update me in a couple weeks.  - lisinopril (ZESTRIL) 20 MG tablet; Take 1 tablet (20 mg) by mouth daily.  - amLODIPine (NORVASC) 5 MG tablet; Take 1 tablet (5 mg) by mouth daily.    Hyperlipidemia, unspecified hyperlipidemia type  Non fasting labs drawn.  - Lipid Profile (Chol, Trig, HDL, LDL calc); Future  - Hepatic panel (Albumin, ALT, AST, Bili, Alk Phos, TP); Future  - Lipid Profile (Chol, Trig, HDL, LDL calc)  - Hepatic panel (Albumin, ALT, AST, Bili, Alk Phos, TP)    Generalized abdominal pain  Chronic. Years.   Issues as a teen.  Prior negative US in past 1-2 years.  Prior PPI and H2 blocker trials.  May be related to the Ocrevis - colitis?  May be related to his Lisinopril.  Changing anti hypertensive medication for a trial.  Easier to change than Ocrevis.  Referral to GI as well.  Updating labs.  - CRP, inflammation; Future  - ESR: Erythrocyte sedimentation rate; Future  - Tissue transglutaminase benito IgA and IgG; Future  - Adult GI  Referral - Consult Only; Future  - CRP, inflammation  - ESR: Erythrocyte sedimentation rate  - Tissue transglutaminase benito IgA and IgG    MS (multiple sclerosis) (H)  Managed by neurology.          BMI  Estimated body mass index is 29.86 kg/m  as calculated from the following:    Height as of this encounter: 1.854 m (6' 1\").    Weight as of this encounter: 102.6 kg (226 lb 4.8 oz).   Weight management plan: Discussed healthy diet and exercise guidelines      Follow-up  [facility] - BP? symptoms? .    The longitudinal plan of care for the diagnosis(es)/condition(s) as documented were addressed during this visit. Due to the added " "complexity in care, I will continue to support Reese in the subsequent management and with ongoing continuity of care.    Subjective   Reese is a 39 year old, presenting for the following health issues:  Lipids and Hypertension    History of Present Illness       Hypertension: He presents for follow up of hypertension.  He does check blood pressure  regularly outside of the clinic. Outpatient blood pressures have not been over 140/90. He follows a low salt diet.     He eats 2-3 servings of fruits and vegetables daily.He consumes 1 sweetened beverage(s) daily.He exercises with enough effort to increase his heart rate 9 or less minutes per day.  He exercises with enough effort to increase his heart rate 3 or less days per week.   He is taking medications regularly.        Vaccines - declines     Fasting labs - not fasting today ; banana, cheeze its, breakfast croissant    Hyperlipidemia Follow-Up  Are you regularly taking any medication or supplement to lower your cholesterol?   No  Are you having muscle aches or other side effects that you think could be caused by your cholesterol lowering medication?  No    Hypertension Follow-up  Do you check your blood pressure regularly outside of the clinic? No   Are you following a low salt diet? No  Are your blood pressures ever more than 140 on the top number (systolic) OR more   than 90 on the bottom number (diastolic), for example 140/90? N/A    BP Readings from Last 2 Encounters:   06/04/25 136/72   04/07/25 130/90     Neurologist - MS. Abdalla.  Could cause colitis.  Could consider changing medication.  Or try to evaluate for other cuases.  Eating - stomach hurts - feels like can't breathe as well.  Lasts hours after eating.  Prilosec - \"Made me ill\" - nauseated  Occurring for years.  Remote EGD - as a teenager.  No colonoscopy.  Stools - variable - looser, not formed; no blood; no mucus; BM frequency variable - can be 2-3 time times  US 3/2024. Negative.  Prior OTC " "H2B.  Weight stable.    Review of Systems  Constitutional, HEENT, cardiovascular, pulmonary, gi and gu systems are negative, except as otherwise noted.      Objective    /72 (BP Location: Right arm, Patient Position: Sitting, Cuff Size: Adult Regular)   Pulse 65   Temp 98.3  F (36.8  C) (Tympanic)   Resp 20   Ht 1.854 m (6' 1\")   Wt 102.6 kg (226 lb 4.8 oz)   SpO2 96%   BMI 29.86 kg/m    Body mass index is 29.86 kg/m .  Physical Exam   GENERAL: alert and no distress  EYES: Eyes grossly normal to inspection, PERRL and conjunctivae and sclerae normal  NECK: no adenopathy, no asymmetry, masses, or scars  RESP: lungs clear to auscultation - no rales, rhonchi or wheezes  CV: regular rate and rhythm, normal S1 S2, no S3 or S4, no murmur, click or rub, no peripheral edema  ABDOMEN: tenderness throughout  - more upper abdomen; but no rebound or guarding, no organomegaly or masses, and bowel sounds normal  MS: no gross musculoskeletal defects noted, no edema  PSYCH: mentation appears normal, affect normal/bright    Labs from ER reviewed.  Today added lipids, hepatic, crp, sed rate, ttg.        Signed Electronically by: Nuha Barrientos MD    "

## 2025-06-04 ENCOUNTER — OFFICE VISIT (OUTPATIENT)
Dept: FAMILY MEDICINE | Facility: OTHER | Age: 40
End: 2025-06-04
Attending: FAMILY MEDICINE
Payer: COMMERCIAL

## 2025-06-04 VITALS
HEIGHT: 73 IN | DIASTOLIC BLOOD PRESSURE: 72 MMHG | TEMPERATURE: 98.3 F | OXYGEN SATURATION: 96 % | HEART RATE: 65 BPM | RESPIRATION RATE: 20 BRPM | WEIGHT: 226.3 LBS | BODY MASS INDEX: 29.99 KG/M2 | SYSTOLIC BLOOD PRESSURE: 136 MMHG

## 2025-06-04 DIAGNOSIS — I10 ESSENTIAL HYPERTENSION: Primary | ICD-10-CM

## 2025-06-04 DIAGNOSIS — G35 MS (MULTIPLE SCLEROSIS) (H): ICD-10-CM

## 2025-06-04 DIAGNOSIS — E78.5 HYPERLIPIDEMIA, UNSPECIFIED HYPERLIPIDEMIA TYPE: ICD-10-CM

## 2025-06-04 DIAGNOSIS — R10.84 GENERALIZED ABDOMINAL PAIN: ICD-10-CM

## 2025-06-04 LAB
ALBUMIN SERPL BCG-MCNC: 4.6 G/DL (ref 3.5–5.2)
ALP SERPL-CCNC: 107 U/L (ref 40–150)
ALT SERPL W P-5'-P-CCNC: 28 U/L (ref 0–70)
AST SERPL W P-5'-P-CCNC: 24 U/L (ref 0–45)
BILIRUB DIRECT SERPL-MCNC: 0.13 MG/DL (ref 0–0.3)
BILIRUB SERPL-MCNC: 0.4 MG/DL
CHOLEST SERPL-MCNC: 240 MG/DL
CRP SERPL-MCNC: <3 MG/L
ERYTHROCYTE [SEDIMENTATION RATE] IN BLOOD BY WESTERGREN METHOD: 3 MM/HR (ref 0–15)
FASTING STATUS PATIENT QL REPORTED: NO
HDLC SERPL-MCNC: 57 MG/DL
LDLC SERPL CALC-MCNC: 160 MG/DL
NONHDLC SERPL-MCNC: 183 MG/DL
PROT SERPL-MCNC: 6.9 G/DL (ref 6.4–8.3)
TRIGL SERPL-MCNC: 113 MG/DL

## 2025-06-04 RX ORDER — AMLODIPINE BESYLATE 5 MG/1
5 TABLET ORAL DAILY
Qty: 30 TABLET | Refills: 0 | Status: SHIPPED | OUTPATIENT
Start: 2025-06-04

## 2025-06-04 RX ORDER — LISINOPRIL 20 MG/1
20 TABLET ORAL DAILY
Qty: 90 TABLET | Refills: 3 | Status: SHIPPED | OUTPATIENT
Start: 2025-06-04

## 2025-06-04 ASSESSMENT — PAIN SCALES - GENERAL: PAINLEVEL_OUTOF10: MODERATE PAIN (4)

## 2025-06-04 NOTE — PATIENT INSTRUCTIONS
Stop Lisinopril 20 mg.  Start Norvasc/amlodipine 5 mg daily in its place for BP control and to see if Lisinopril was causing the abdominal symptoms.  Update me in 2-3 weeks.   GI referral for consult, possible upper/lower scopes.  Labs today - will notify of results.

## 2025-06-05 ENCOUNTER — RESULTS FOLLOW-UP (OUTPATIENT)
Dept: FAMILY MEDICINE | Facility: OTHER | Age: 40
End: 2025-06-05

## 2025-06-12 ENCOUNTER — OFFICE VISIT (OUTPATIENT)
Dept: SURGERY | Facility: OTHER | Age: 40
End: 2025-06-12
Attending: STUDENT IN AN ORGANIZED HEALTH CARE EDUCATION/TRAINING PROGRAM
Payer: COMMERCIAL

## 2025-06-12 VITALS
OXYGEN SATURATION: 98 % | DIASTOLIC BLOOD PRESSURE: 76 MMHG | RESPIRATION RATE: 16 BRPM | SYSTOLIC BLOOD PRESSURE: 130 MMHG | HEART RATE: 85 BPM

## 2025-06-12 DIAGNOSIS — R10.84 GENERALIZED ABDOMINAL PAIN: ICD-10-CM

## 2025-06-12 ASSESSMENT — PAIN SCALES - GENERAL: PAINLEVEL_OUTOF10: MODERATE PAIN (4)

## 2025-06-12 NOTE — PATIENT INSTRUCTIONS
Thank you for allowing Dr. Bharath Vigil and our surgical team to participate in your care. Please call our health unit coordinator at [phone] with scheduling questions or the nurse at [phone] with any other questions or concerns.      You have been scheduled for: UPPER ENDOSCOPY with Dr. Bharath Vigil on PATIENT TO CALL WITH DATE at [facility].     You will be notified the weekday before the procedure for your check in time, you can also call admitting at  after 5:00PM on the weekday before the procedure.    You will need to have and adult  to bring you home.    Surgery Education nurse (PAT phone call) to call one week prior to procedure, If you do not hear from them you can call them at this number    ONE WEEK PRIOR  Please have your medication list available for this phone call.      Please see handout for additional instruction.    You WILL need a pre-operative appointment with your primary care provider within 30 days of your procedure    You may call [phone] or [phone] with any questions.

## 2025-06-12 NOTE — PROGRESS NOTES
[facility] General Surgery Consultation    CHIEF COMPLAINT:  Chief Complaint   Patient presents with    Consult     Referred by Dr. Hobbs for abdomen pain       HISTORY OF PRESENT ILLNESS:  Reese Sanchez is a 40 year old male who is seen in consultation at the request of Dr. Hobbs for evaluation of abdominal pain.  He reports episodes of abdominal pain for several years which has recently gotten worse.  He reports pain mostly localized to his epigastrium.  He reports associated shortness of breath and chest pain with this.  He denies any dysphagia or retrosternal burning/heartburn.  He denies any reflux of his oropharynx or worsening symptoms while supine.  He reports his pain generally gets worse after eating.  He has had a abdominal ultrasound recently which did not demonstrate evidence of gallstones.  He has a history of a gastric ulcer at age 20 found on endoscopy.  The cause of this was unknown.  He has not been on any PPI or acid medications recently.  He was previously taking a PPI and an H2 blocker but thought that they made his symptoms worse.  He denies any known family history of any abdominal cancers.      REVIEW OF SYSTEMS:  10 point ROS completed and negative unless otherwise listed in HPI    Past Medical History:   Diagnosis Date    Hypertension     MS (multiple sclerosis) (H)        Past Surgical History:   Procedure Laterality Date    APPENDECTOMY      IR LUMBAR PUNCTURE  2019    wisdom teeth extraction         Family History   Problem Relation Age of Onset    Arrhythmia Maternal Grandmother     Heart Disease Maternal Grandfather     Heart Disease Paternal Grandfather        Social History     Tobacco Use    Smoking status: Former     Current packs/day: 0.00     Average packs/day: 1 pack/day for 5.0 years (5.0 ttl pk-yrs)     Types: Cigarettes     Start date: 2009     Quit date: 2014     Years since quittin.4     Passive exposure: Past    Smokeless tobacco: Never    Substance Use Topics    Alcohol use: Yes       Patient Active Problem List   Diagnosis    MS (multiple sclerosis) (H)    Essential hypertension    Hyperlipidemia, unspecified hyperlipidemia type    Abnormal MRI    Blurring of visual image    Cervicalgia    Heat sensitivity    Hx of autoimmune disorder    Hypogammaglobulinemia    Mild cognitive impairment    Muscle weakness    Optic neuropathy    Paresthesias    Pinched nerve in neck    Pseudoparasitic dysesthesias (H)    Visual field defect       No Known Allergies    Current Outpatient Medications   Medication Sig Dispense Refill    acetaminophen (TYLENOL) 500 MG tablet Take 500-1,000 mg by mouth every 6 hours as needed for mild pain      amLODIPine (NORVASC) 5 MG tablet Take 1 tablet (5 mg) by mouth daily. 30 tablet 0    gabapentin (NEURONTIN) 300 MG capsule TAKE 1 TO 3 CAPSULES BY MOUTH THREE TIMES DAILY AS TOLERATED AS NEEDED FOR PAIN      lisinopril (ZESTRIL) 20 MG tablet Take 1 tablet (20 mg) by mouth daily. 90 tablet 3    ocrelizumab (OCREVUS) 300 MG/10ML SOLN injection Inject into the vein See Admin Instructions Twice yearly         Vitals: /76 (BP Location: Left arm, Cuff Size: Adult Large)   Pulse 85   Resp 16   SpO2 98%   BMI= There is no height or weight on file to calculate BMI.    EXAM:  General: Vital signs reviewed, in no apparent distress  Eyes: Anicteric  HENT: Normocephalic, atraumatic, trachea midline   Respiratory: Breathing nonlabored  Musculoskeletal: No gross deformities  Neurologic: Grossly nonfocal exam  Psychiatric: Normal mood, affect and insight  Integumentary: Warm and dry    All labs and imaging personally reviewed and significant for:    Latest Reference Range & Units 04/07/25 18:40 06/04/25 16:39   Sodium 135 - 145 mmol/L 139    Potassium 3.4 - 5.3 mmol/L 4.0    Chloride 98 - 107 mmol/L 101    Carbon Dioxide (CO2) 22 - 29 mmol/L 27    Urea Nitrogen 6.0 - 20.0 mg/dL 13.1    Creatinine 0.67 - 1.17 mg/dL 1.02    GFR Estimate  >60 mL/min/1.73m2 >90    Calcium 8.8 - 10.4 mg/dL 9.9    Anion Gap 7 - 15 mmol/L 11    Albumin 3.5 - 5.2 g/dL  4.6   Protein Total 6.4 - 8.3 g/dL  6.9   Alkaline Phosphatase 40 - 150 U/L  107   ALT 0 - 70 U/L  28   AST 0 - 45 U/L  24   Bilirubin Direct 0.00 - 0.30 mg/dL  0.13   Bilirubin Total <=1.2 mg/dL  0.4   Cholesterol <200 mg/dL  240 (H)   CRP Inflammation <5.00 mg/L  <3.00   Patient Fasting?   No   Glucose 70 - 99 mg/dL 98    HDL Cholesterol >=40 mg/dL  57   LDL Cholesterol Calculated <100 mg/dL  160 (H)   Non HDL Cholesterol <130 mg/dL  183 (H)   Tissue Transglutaminase Antibody IgA <7.0 U/mL  <0.2   Tissue Transglutaminase Silvia IgG <7.0 U/mL  <0.6   Triglycerides <150 mg/dL  113   Troponin T, High Sensitivity <=22 ng/L 7    (H): Data is abnormally high    Narrative & Impression   PROCEDURES: US ABDOMEN LIMITED     HISTORY:38 years Male   epigastric pain. Abdominal pain nausea and  diarrhea since yesterday. Vomited once this morning.     TECHNIQUE:  Ultrasound of the right upper quadrant was performed.     COMPARISON: None.      FINDINGS:     PANCREAS:The pancreatic head is unremarkable. The remainder the  pancreas is not well-visualized.        LIVER: Echogenicity is uniform.     GALLBLADDER: No gallstones are present. Wall thickness is 2.7 mm.  There is no pericholecystic fluid. The common duct is 4.1 mm. There is  no biliary dilatation.     Right Kidney: 9.6 cm in length. No hydronephrosis.                                                                            IMPRESSION: No evidence of cholelithiasis, cholecystitis or biliary  dilatation.     RADHA QUINTANA MD        ASSESSMENT:  Reese Sanchez is a 40 year old who presents with epigastric pain worsening after eating.  Ultrasound without evidence of cholelithiasis.  History of gastric ulcer at age 20.  Currently not taking any acid suppressing medication.  Significant pertinent co-morbidities include: MS on biologic therapy, Hypertension.    I  discussed that his pain is likely related to high acid production given his history of a gastric ulcer without known cause at age 20.  I recommended starting a PPI but he is hesitant to do this as he is currently switching his blood pressure medications in hopes that this helps his symptoms.  I do not believe that his blood pressure medications are related to this his symptoms he describes but it is reasonable to try to eliminate this as a possibility.  I discussed my recommendation for an EGD to evaluate for any recurrent ulcer, gastritis, reflux as well as evaluating for any evidence of eosinophilic esophagitis.  I discussed my recommendation to trial Tums to see how this affects his pain.  I recommended that he does not take Tums long-term without being on an H2 blocker or PPI, patient is understanding of this.      PLAN:  Plan for EGD in the near future  Will likely start PPI following EGD depending on findings  Trial Tums 2 tablets at onset of pain as needed to evaluate how this affects your abdominal pain    It was my pleasure to participate in the care of Reese Sanchez in clinic today. Thank you for this consultation.         Bharath Vigil MD, MS  General Surgeon  [facility]

## 2025-07-12 ENCOUNTER — HEALTH MAINTENANCE LETTER (OUTPATIENT)
Age: 40
End: 2025-07-12

## 2025-07-31 ENCOUNTER — OFFICE VISIT (OUTPATIENT)
Dept: FAMILY MEDICINE | Facility: OTHER | Age: 40
End: 2025-07-31
Attending: FAMILY MEDICINE
Payer: COMMERCIAL

## 2025-07-31 VITALS
DIASTOLIC BLOOD PRESSURE: 82 MMHG | WEIGHT: 223 LBS | SYSTOLIC BLOOD PRESSURE: 128 MMHG | HEART RATE: 68 BPM | BODY MASS INDEX: 29.42 KG/M2 | TEMPERATURE: 97.3 F | OXYGEN SATURATION: 97 % | RESPIRATION RATE: 18 BRPM

## 2025-07-31 DIAGNOSIS — H93.11 TINNITUS, RIGHT: ICD-10-CM

## 2025-07-31 DIAGNOSIS — I10 ESSENTIAL HYPERTENSION: Primary | ICD-10-CM

## 2025-07-31 LAB
ANION GAP SERPL CALCULATED.3IONS-SCNC: 11 MMOL/L (ref 7–15)
BUN SERPL-MCNC: 12.8 MG/DL (ref 6–20)
CALCIUM SERPL-MCNC: 9.7 MG/DL (ref 8.8–10.4)
CHLORIDE SERPL-SCNC: 103 MMOL/L (ref 98–107)
CREAT SERPL-MCNC: 1.07 MG/DL (ref 0.67–1.17)
EGFRCR SERPLBLD CKD-EPI 2021: >90 ML/MIN/1.73M2
GLUCOSE SERPL-MCNC: 104 MG/DL (ref 70–99)
HCO3 SERPL-SCNC: 28 MMOL/L (ref 22–29)
POTASSIUM SERPL-SCNC: 4.3 MMOL/L (ref 3.4–5.3)
SODIUM SERPL-SCNC: 142 MMOL/L (ref 135–145)

## 2025-07-31 ASSESSMENT — PAIN SCALES - GENERAL: PAINLEVEL_OUTOF10: NO PAIN (0)

## 2025-07-31 NOTE — PROGRESS NOTES
Assessment & Plan     Essential hypertension  Improved - stable.  Tolerating Losartan 25 mg daily.  Continue current dose.  Update BMP.  Monitor home BP if able periodically.  - Basic metabolic panel; Future  - Basic metabolic panel    Tinnitus, right  New onset past week.  Perhaps work related - noise exposure?  No debris or wax.  TM intact.  Air quality alerts with [place] wildfires - causing sinus issues?  Discussed symptomatic cares - fluids, rest, sinus rinses, antihistamine, nasal steroid spray.  If not resolving in next 1-2 weeks plan for audiology referral.    The longitudinal plan of care for the diagnosis(es)/condition(s) as documented were addressed during this visit. Due to the added complexity in care, I will continue to support Reese in the subsequent management and with ongoing continuity of care.  Follow-up  Return if symptoms worsen or fail to improve.    Wojciech Leigh is a 40 year old, presenting for the following health issues:  Hypertension        7/31/2025     4:01 PM   Additional Questions   Roomed by DIANE Medina CMA   Accompanied by Self         7/31/2025     4:01 PM   Patient Reported Additional Medications   Patient reports taking the following new medications None     HPI      Hypertension Follow-up  Do you check your blood pressure regularly outside of the clinic? No   Are you following a low salt diet? No  Are your blood pressures ever more than 140 on the top number (systolic) OR more   than 90 on the bottom number (diastolic), for example 140/90? N/A    Prior lisinopril - possibly causing abdominal pain  Norvasc - pedal edema  Chlorthalidone - multiple symptoms - not tolerated - see mycmarkt 6/30/25  Cozaar - 6/30/25 - 25 mg -     Home readings - no    No new symptoms.    Right ear - ringing - present x past week; no trauma  No OTC agents.  E visit - sinuses - HA/ears - treated; no drainage  Ear plugs at work    BP Readings from Last 2 Encounters:   07/31/25 128/82   06/12/25 130/76            Review of Systems  Constitutional, HEENT, cardiovascular, pulmonary, gi and gu systems are negative, except as otherwise noted.      Objective    /82   Pulse 68   Temp 97.3  F (36.3  C) (Tympanic)   Resp 18   Wt 101.2 kg (223 lb)   SpO2 97%   BMI 29.42 kg/m    Body mass index is 29.42 kg/m .  Physical Exam   GENERAL: alert and no distress  EYES: Eyes grossly normal to inspection, PERRL and conjunctivae and sclerae normal  HENT: ear canals and TM's normal, nose and mouth without ulcers or lesions  NECK: no adenopathy, no asymmetry, masses, or scars  RESP: lungs clear to auscultation - no rales, rhonchi or wheezes  CV: regular rate and rhythm, normal S1 S2, no S3 or S4, no murmur, click or rub, no peripheral edema  MS: no gross musculoskeletal defects noted, no edema  PSYCH: mentation appears normal, affect normal/bright    BMP lab pending        Signed Electronically by: Nuha Barrientos MD

## 2025-07-31 NOTE — PATIENT INSTRUCTIONS
Continue Losartan 25 mg daily.  Will notify of lab.  If ongoing right ear ringing - plan for audiology consult.  Can flush sinuses with saline, neti pot; antihistamine such as Claritin; nasal steroid spray such as Flonase.